# Patient Record
Sex: FEMALE | Race: WHITE | NOT HISPANIC OR LATINO | URBAN - METROPOLITAN AREA
[De-identification: names, ages, dates, MRNs, and addresses within clinical notes are randomized per-mention and may not be internally consistent; named-entity substitution may affect disease eponyms.]

---
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
## 2021-03-11 ENCOUNTER — SEXUAL HEALTH (OUTPATIENT)
Dept: SURGERY | Facility: CLINIC | Age: 21
End: 2021-03-11

## 2021-03-11 DIAGNOSIS — Z11.3 SCREENING FOR STD (SEXUALLY TRANSMITTED DISEASE): Primary | ICD-10-CM

## 2021-03-11 NOTE — PROGRESS NOTES
Assessment/Plan:        Subjective:      Patient ID: Dyke Carrel is a 24 y o  female  Objective: There were no vitals taken for this visit  CHIEF CONCERN(S)      Here for screening, denies symptoms, does report slight watery discharge, but declines exam with provider, patient is currently on menes, per protocol, previous exam, does not require exam for screening  ua obtained for gc/ct screening    LMP 3/8/21    LPS none     Birth Control Method none    Condom Used: Yes    Sexual Preference :  Bisexual    Date of Last Sexual Exposure: 2/2020    # of Partners: Last 30 days 0, Last 80 days 0 and Last Year 1    Sexual Practices: Oral and Vaginal      Previously Sexually Transmitted Diseases  Type Date Source of Care Treatment Comment                            Test Date Results   RPR     HIC     GC     CT           1  CURRENT RISK BEHAVIOR(S)    Unprotected sex with multiple/anonymous partners, sex under the influence of drugs or alcohol and Unprotected sex with a partner(s) with an STD? HIV     PREVIOUS SUCCESSES    Used condoms when having sex and Discussed condom use prior to having sex with partner(s)        3  SAFER GOAL BEHAVIOR(S)    Always use condoms to have sex and Get tested if condom breaks/ leaks          4  PERSON ACTION PLAN:    > BARRIERS:    No condom use or discussions    > BENEFITS:    Obtain free condoms from B to decrease STD exposure        > ACTION STEPS:      Use condoms at least 50% of the time and steadily increase over time until condom use is 100% of the time and Discuss condom use prior to having sex with partner(s)    Testing today hiv/std  Consent for hiv testing  ua for gc/ct      4   REFERRALS:

## 2021-03-18 ENCOUNTER — SEXUAL HEALTH (OUTPATIENT)
Dept: SURGERY | Facility: CLINIC | Age: 21
End: 2021-03-18

## 2021-03-18 DIAGNOSIS — Z20.2 EXPOSURE TO GONORRHEA: Primary | ICD-10-CM

## 2021-03-18 NOTE — PROGRESS NOTES
Pt given results for HIV/STI testing  Pt results were positive for GC, negative for HIV and CT  Lab result copies were given to patient  Per provider order, dispensed  Azythromycin 1 gram po 1X now with water, and Ceftriaxone 500 mg IM given in right dorsal gluteal  Medication information and education for GC reviewed and given to patient     Pt encouraged to return with any new symptoms, new sexual partners and at least once yearly

## 2021-03-18 NOTE — PROGRESS NOTES
Pt was seen in the STD clinic for results  Pt was positive for gonorrhea and explained how gonorrhea is transmitted and if partner is not treated she can become reinfected again  Pt was advised to clean all sex toys  Pt reports she had 1 sex partner last March 2020 and he told her he had only 1 other partner and did not have any STD's; condom was used  Discussed cover pt for CT/GC since she was concerned and had been treated last year in 2/2020 for CT  Pt gave verbal consent for treatment  Pt's weight: 191 lb  · Please give ceftriaxone 500 mg IM X 1 dose now and azithromycin 1 gram PO X 1 dose now  · Follow up tested for STD recommended in 3 months or later  Practicing safe sex using a condom for each sexually encounter  Condoms offer the best protection against STD's by acting as a physical barrier to prevent the exchange of semen, vaginal fluids, and blood between partners  Condoms are not a 100% effective in protection  · Reducing the number of sexual partners can also help to reduce the risk of the transmission of STD's along with regular STD screening

## 2021-03-29 ENCOUNTER — TELEPHONE (OUTPATIENT)
Dept: SURGERY | Facility: CLINIC | Age: 21
End: 2021-03-29

## 2021-03-31 ENCOUNTER — TELEPHONE (OUTPATIENT)
Dept: SURGERY | Facility: CLINIC | Age: 21
End: 2021-03-31

## 2021-03-31 NOTE — TELEPHONE ENCOUNTER
Returned call to Replaced by [facility], she has questions re: positive gc result and treatment, she states she contacted the 1 person she had sexual encounter with between her previous testing in 2020 and treatment for ct,  and this test done on 3/11/21, she reports that sexual contact states he was tested and negative, she has questions of how this could be  Discussed possibilities with pt  :  1  Possible false positive gc result- that we treat based on lab results,  and treatment is the protocol  2  Possible that previous sexual contact either did not get tested or that he had a false negative, or that he had a positive result and was treated but did not want to share that with her  3  Possible that the gc was present at last test in 2020 but was not detected at that time  Not likely but is a possibility  Encouraged pt  To get retested in couple months and earlier if any symptoms, and to call again if further questions    Pt  States understanding

## 2022-01-06 ENCOUNTER — SEXUAL HEALTH (OUTPATIENT)
Dept: SURGERY | Facility: CLINIC | Age: 22
End: 2022-01-06

## 2022-01-06 DIAGNOSIS — Z11.3 SCREENING FOR STD (SEXUALLY TRANSMITTED DISEASE): Primary | ICD-10-CM

## 2022-01-06 NOTE — PROGRESS NOTES
Assessment/Plan:     Patient here for testing  Patient was not seen by a provider  Subjective:      Patient ID: Haley aWyne is a 22 y o  female  HPI        Review of Systems      Objective: There were no vitals taken for this visit  Physical Exam           CHIEF CONCERN(S)    Patient here for testing    No LMP recorded  12/15/2021    LPS n/a    Birth Control Method n/a  Condom Used: No     Sexual Preference :  Bisexual    Date of Last Sexual Exposure:1/1/2021    # of Partners: Last 30 days 1, Last 90 days 1 and Last Year 1    Sexual Practices: Oral and Vaginal      Previously Sexually Transmitted Diseases  Type Date Source of Care Treatment Comment   GC 3/730297  ceftriaxone    CT 2020  pills               Test Date Results   RPR 3/17/2021 negative   HIv 3/17/2021 negative   GC 3/17/2021 positive   CT 3/17/2020 negative         1  CURRENT RISK BEHAVIOR(S)    Unprotected sex with multiple/anonymous partners and Unprotected sex with a partner(s) with an STD? HIV     PREVIOUS SUCCESSES    Maintained a monogamous relationship with only one partner and Discussed condom use prior to having sex with partner(s)        3  SAFER GOAL BEHAVIOR(S)    Always use condoms to have sex and Get tested if condom breaks/ leaks          4  PERSON ACTION PLAN:    > BARRIERS:    No condom use or discussions    > BENEFITS:    Obtain free condoms from B to decrease STD exposure        > ACTION STEPS:      Choose to abstain from sex and Get tested is an exposure occurred such as a condom breaks/ leaked          4   REFERRALS:

## 2022-01-13 ENCOUNTER — SEXUAL HEALTH (OUTPATIENT)
Dept: SURGERY | Facility: CLINIC | Age: 22
End: 2022-01-13

## 2022-01-13 DIAGNOSIS — Z71.2 ENCOUNTER TO DISCUSS TEST RESULTS: Primary | ICD-10-CM

## 2022-01-13 NOTE — PROGRESS NOTES
Patient given results  All tests, GC, CT, Syphilis, HIV, were negative  Copies of test results given to the patient  Patient encouraged to return with any new symptoms, new sexual partners and at least once yearly

## 2022-12-05 ENCOUNTER — OFFICE VISIT (OUTPATIENT)
Dept: OBGYN CLINIC | Facility: CLINIC | Age: 22
End: 2022-12-05

## 2022-12-05 VITALS
HEART RATE: 85 BPM | WEIGHT: 219 LBS | BODY MASS INDEX: 38.8 KG/M2 | DIASTOLIC BLOOD PRESSURE: 89 MMHG | SYSTOLIC BLOOD PRESSURE: 135 MMHG | HEIGHT: 63 IN

## 2022-12-05 DIAGNOSIS — R21 SKIN RASH: ICD-10-CM

## 2022-12-05 DIAGNOSIS — Z01.411 ENCOUNTER FOR GYNECOLOGICAL EXAMINATION WITH ABNORMAL FINDING: Primary | ICD-10-CM

## 2022-12-05 PROBLEM — Z01.419 ENCOUNTER FOR GYNECOLOGICAL EXAMINATION WITHOUT ABNORMAL FINDING: Status: ACTIVE | Noted: 2022-12-05

## 2022-12-05 RX ORDER — AMOXICILLIN 500 MG/1
CAPSULE ORAL
COMMUNITY
Start: 2022-10-07 | End: 2022-12-05 | Stop reason: ALTCHOICE

## 2022-12-05 RX ORDER — CLOTRIMAZOLE AND BETAMETHASONE DIPROPIONATE 10; .64 MG/G; MG/G
CREAM TOPICAL
COMMUNITY
Start: 2022-10-18 | End: 2022-12-05 | Stop reason: ALTCHOICE

## 2022-12-05 RX ORDER — NYSTATIN 100000 U/G
CREAM TOPICAL 2 TIMES DAILY
Qty: 30 G | Refills: 0 | Status: SHIPPED | OUTPATIENT
Start: 2022-12-05 | End: 2022-12-19

## 2022-12-05 RX ORDER — PREDNISONE 10 MG/1
TABLET ORAL
COMMUNITY
Start: 2022-10-18 | End: 2022-12-05 | Stop reason: ALTCHOICE

## 2022-12-05 NOTE — PROGRESS NOTES
ASSESSMENT & PLAN: Sophy Gordon is a 25 y o   G0 obstetric history on file  with normal gynecologic exam     1   Routine well woman exam done today  2  Pap:  The patient's last pap-1st Pap done today  Current ASCCP Guidelines reviewed  3   STD testing  was not done patient declines  4  Gardasil recommendations reviewed  is not   vaccinated  Information provided reviewed for her to check with her insurance  5  The following were reviewed in today's visit: breast self exam, STD testing, adequate intake of calcium and vitamin D, exercise and healthy diet  6  Nystatin external cream to be applied to right groin b i d  Times 14 days, return to office if not improved      CC:  Annual Gynecologic Examination    HPI: Sophy Gordon is a 25 y o  No obstetric history on file  who presents for annual gynecologic examination  She is a new patient to us  Positive GC and CT in the past  about 2 years ago and negative f/u  She reports she is unsure if her insurance will pay for testing and declines testing today  She reviewed she can go to [facility] to see she can get testing done there  She is in a same-sex relationship, reports she was  09/2022  Patient reports she moved from [facility] about 5 years ago  She reports she did not get Gardasil vaccine but will comes information on vaccine  She does report an itchy rash on her right groin, she denies any vaginal discharge or itching  Health Maintenance:    She wears her seatbelt routinely  She does perform regular monthly self breast exams  She feels safe at home  History reviewed  No pertinent past medical history  History reviewed  No pertinent surgical history  OB/Gyn History:    Pt does not have menstrual issues  Menses are regular, last 7 days, can be heavy for the 1st 2 days, changes pads and tampons 3 times per day when heavy    History of sexually transmitted infection: Yes    Chlamydia and gonorrhea  History of abnormal pap smears: No 1st Pap due today  Patient is currently sexually active  The current method of family planning is none  OB History    No obstetric history on file  Family History   Problem Relation Age of Onset   • Diabetes Father    • No Known Problems Brother    • Breast cancer Neg Hx    • Colon cancer Neg Hx    • Ovarian cancer Neg Hx        Social History:  Social History     Socioeconomic History   • Marital status: Single     Spouse name: Not on file   • Number of children: Not on file   • Years of education: Not on file   • Highest education level: Not on file   Occupational History   • Not on file   Tobacco Use   • Smoking status: Former     Packs/day: 0 10     Types: Cigarettes     Quit date: 2022     Years since quittin 8   • Smokeless tobacco: Never   Vaping Use   • Vaping Use: Never used   Substance and Sexual Activity   • Alcohol use: Yes     Comment: socially   • Drug use: Not Currently   • Sexual activity: Yes     Partners: Female     Birth control/protection: None   Other Topics Concern   • Not on file   Social History Narrative   • Not on file     Social Determinants of Health     Financial Resource Strain: Low Risk    • Difficulty of Paying Living Expenses: Not hard at all   Food Insecurity: No Food Insecurity   • Worried About Running Out of Food in the Last Year: Never true   • Ran Out of Food in the Last Year: Never true   Transportation Needs: No Transportation Needs   • Lack of Transportation (Medical): No   • Lack of Transportation (Non-Medical):  No   Physical Activity: Not on file   Stress: No Stress Concern Present   • Feeling of Stress : Not at all   Social Connections: Not on file   Intimate Partner Violence: Not At Risk   • Fear of Current or Ex-Partner: No   • Emotionally Abused: No   • Physically Abused: No   • Sexually Abused: No   Housing Stability: Low Risk    • Unable to Pay for Housing in the Last Year: No   • Number of Places Lived in the Last Year: 2   • Unstable Housing in the Last Year: No     Patient is   Patient is currently employed     No Known Allergies      Current Outpatient Medications:   •  nystatin (MYCOSTATIN) cream, Apply topically 2 (two) times a day for 14 days, Disp: 30 g, Rfl: 0    Review of Systems:  Constitutional :no fever, feels well, no tiredness, no recent weight gain or loss  ENT: no ear ache, no loss of hearing, no nosebleeds or nasal discharge, no sore throat or hoarseness  Cardiovascular: no complaints of slow or fast heart beat, no chest pain, no palpitations, no leg claudication or lower extremity edema  Respiratory: no complaints of shortness of shortness of breath, no LOZOYA  Breasts:no complaints of breast pain, breast lump, or nipple discharge  Gastrointestinal: no complaints of abdominal pain, constipation, nausea, vomiting, or diarrhea or bloody stools  Genitourinary : no complaints of dysuria, incontinence, pelvic pain, no dysmenorrhea, vaginal discharge or abnormal vaginal bleeding and as noted in HPI  Musculoskeletal: no complaints of arthralgia, no myalgia, no joint swelling or stiffness, no limb pain or swelling    Integumentary: no complaints of skin rash or lesion, itching or dry skin  Neurological: no complaints of headache, no confusion, no numbness or tingling, no dizziness or fainting    Objective      /89   Pulse 85   Ht 5' 3" (1 6 m)   Wt 99 3 kg (219 lb)   LMP 11/23/2022   BMI 38 79 kg/m²     General:   appears stated age, cooperative, alert normal mood and affect   Neck: normal, supple,trachea midline, no masses   Heart: regular rate and rhythm, S1, S2 normal, no murmur, click, rub or gallop   Lungs: clear to auscultation bilaterally   Breasts: normal appearance, no masses or tenderness, Inspection negative, No nipple retraction or dimpling, No nipple discharge or bleeding, No axillary or supraclavicular adenopathy, Normal to palpation without dominant masses, Taught monthly breast self examination   Abdomen: soft, non-tender, without masses or organomegaly   Vulva: normal female genitalia, Bartholin's, Urethra, Durand normal   Right panty line area with erythema, demarcated, yeast like itchy rash   Vagina: normal vagina, no discharge, exudate, lesion, or erythema   Urethra: normal   Cervix: Normal, no discharge  PAP done  Nontender  Uterus: normal size, contour, position, consistency, mobility, non-tender and anteverted   Adnexa: normal adnexa and no mass, fullness, tenderness   Lymphatic palpation of lymph nodes in neck, axilla, groin and/or other locations: no lymphadenopathy or masses noted   Skin normal skin turgor and no rashes     Psychiatric orientation to person, place, and time: normal  mood and affect: normal

## 2022-12-13 LAB
LAB AP GYN PRIMARY INTERPRETATION: NORMAL
Lab: NORMAL

## 2023-02-07 ENCOUNTER — TELEPHONE (OUTPATIENT)
Dept: PSYCHIATRY | Facility: CLINIC | Age: 23
End: 2023-02-07

## 2023-02-07 NOTE — TELEPHONE ENCOUNTER
Writer contacted pt in regards to a vm we received in which pt requested talk therapy services  Writer informed her that there are no openings available at this moment   She refused to be added to wait list

## 2023-02-17 ENCOUNTER — OFFICE VISIT (OUTPATIENT)
Dept: OBGYN CLINIC | Facility: CLINIC | Age: 23
End: 2023-02-17

## 2023-02-17 VITALS
BODY MASS INDEX: 38.62 KG/M2 | SYSTOLIC BLOOD PRESSURE: 126 MMHG | WEIGHT: 218 LBS | DIASTOLIC BLOOD PRESSURE: 80 MMHG | HEIGHT: 63 IN

## 2023-02-17 DIAGNOSIS — N89.8 VAGINAL DISCHARGE: ICD-10-CM

## 2023-02-17 DIAGNOSIS — L29.2 VULVAR ITCHING: ICD-10-CM

## 2023-02-17 DIAGNOSIS — R39.9 UTI SYMPTOMS: Primary | ICD-10-CM

## 2023-02-17 LAB
BV WHIFF TEST VAG QL: NEGATIVE
CLUE CELLS SPEC QL WET PREP: NORMAL
PH SMN: 4.5 [PH]
SL AMB  POCT GLUCOSE, UA: NORMAL
SL AMB LEUKOCYTE ESTERASE,UA: NORMAL
SL AMB POCT BILIRUBIN,UA: NORMAL
SL AMB POCT BLOOD,UA: NORMAL
SL AMB POCT KETONES,UA: NORMAL
SL AMB POCT NITRITE,UA: NORMAL
SL AMB POCT PH,UA: 5
SL AMB POCT SPECIFIC GRAVITY,UA: 1.02
SL AMB POCT URINE PROTEIN: NORMAL
SL AMB POCT UROBILINOGEN: NORMAL
SL AMB POCT WET MOUNT: NORMAL
T VAGINALIS VAG QL WET PREP: NORMAL
YEAST VAG QL WET PREP: NORMAL

## 2023-02-17 RX ORDER — CLOTRIMAZOLE AND BETAMETHASONE DIPROPIONATE 10; .64 MG/G; MG/G
CREAM TOPICAL 2 TIMES DAILY
Qty: 30 G | Refills: 0 | Status: SHIPPED | OUTPATIENT
Start: 2023-02-17 | End: 2023-02-24

## 2023-02-17 RX ORDER — SODIUM FLUORIDE 6 MG/ML
PASTE, DENTIFRICE DENTAL
COMMUNITY
Start: 2022-12-01

## 2023-02-17 RX ORDER — IBUPROFEN 400 MG/1
400 TABLET ORAL AS NEEDED
COMMUNITY

## 2023-02-17 NOTE — PROGRESS NOTES
Assessment/Plan:       Diagnoses and all orders for this visit:    UTI symptoms  -     POCT urine dip  -     Urine culture    Vulvar itching  -     clotrimazole-betamethasone (LOTRISONE) 1-0 05 % cream; Apply topically 2 (two) times a day for 7 days  -     Cancel: VAGINOSIS DNA PROBE (AFFIRM)  -     VAGINOSIS DNA PROBE (AFFIRM)  -     POCT wet mount    Vaginal discharge  -     Cancel: VAGINOSIS DNA PROBE (AFFIRM)  -     VAGINOSIS DNA PROBE (AFFIRM)  -     POCT wet mount    Other orders  -     Sodium Fluoride 5000 PPM 1 1 % PSTE; BRUSH FOR 2 MINUTES AND DO NOT RINSE  -     ibuprofen (MOTRIN) 400 mg tablet; Take 400 mg by mouth if needed for mild pain      50-year-old female new to our office, vaginal, vulvar and urinary symptoms as in HPI  Reported history of chlamydia and gonorrhea treated 3 years ago  Female partner  Pap - December 2022  Examination unremarkable  Wet mount unremarkable  Urine dip normal    Plan:    Affirm today  Recommended STD screening Chlamydia gonorrhea  Patient concerned about it not being covered by insurance and would prefer to go to free STD clinic  Patient will consider going to [place] [id number] [facility]D clinic in [place]  Urine culture today  Topical Lotrisone to affected areas of vulva twice a day for 7 days for alleviation of sxs  Discussed if testing is negative for any infection could be hygiene or irritant  We will discuss definitive treatment plan once results are back  Discussed patient's menstrual cycles in more detail and they do seem to be fairly regular  She just saw*[facility] for annual in December and documented that periods are regular and does not have any menstruation concern at that time  She did get menstruation in December as well as in January with LMP 1/29    I have recommended patient download anny on her phone such as flow instead of just writing down dates that way she can track the number of days between her menses to get a better idea of regularity  Patient advised to make sure she follows up with our office or [place] for her annual, sooner if any problems arise or follow-up is needed to her problems  Chief Complaint   Patient presents with   • Vaginal Itching     Itching on outside of vagina started 2 weeks ago  • Possible UTI     Pt states she has a tingling feeling to urinate  Denies burning when urinating, bladder pain, and spotting  • Vaginal Discharge     Pt states she's been having more discharge than normal, clear/watery  • Menstrual Problem     Irregular periods 2-3 week delay  Subjective:      Patient ID: Dwayne March is a 25 y o  female  23y/o F here today for vaginal and urinary sxs x 1-2 wks  States feels itching vulvar and vaginal opening  She thinks increase in discharge through the years but denies odor or abnormal color or consistency  Reports discharge is clear and watery  She also feels something urinary, weird feeling near urethra or in vaginal area, like a urinary urge  Denies pelvic pressure or pain  Does feel some pressure in vagina  Denies hematuria, though she has been having increase in urination  She is sexually active  She has a female partner  States she has hx of chlamydia/gonorrhea about 3 yrs ago, treated STD clinic [address]  States had a f/u test and was negative  Denies any recent STD screening within the past year  Patient also verbalized concern about possibly irregular menses  However she is looking at her dates of LMP's and are seeming to be around once a month about every 30 days or so  She thinks that she forgot to track one of her LMP's in November  They are moderate to light and amount of blood and last 7 days  Pap and annual up-to-date performed at [facility] in December 2022  Pap was negative            The following portions of the patient's history were reviewed and updated as appropriate: allergies, current medications, past medical history, past social history and problem list     Review of Systems   Constitutional: Negative  Gastrointestinal: Negative  Genitourinary:        As in HPI         Objective:      /80 (BP Location: Left arm, Patient Position: Sitting, Cuff Size: Adult)   Ht 5' 3" (1 6 m)   Wt 98 9 kg (218 lb)   LMP 01/29/2023 (Exact Date)   BMI 38 62 kg/m²          Physical Exam  Vitals reviewed  Constitutional:       Appearance: Normal appearance  Abdominal:      Tenderness: There is no abdominal tenderness  Genitourinary:     General: Normal vulva  Labia:         Right: No rash, tenderness or lesion  Left: No rash, tenderness or lesion  Vagina: Normal  No vaginal discharge, erythema, tenderness, bleeding or lesions  Cervix: No cervical motion tenderness, discharge, friability, lesion, erythema or cervical bleeding  Uterus: Not tender  Comments: No visible erythema, swelling, lesions or rash in areas reported itchy  Lymphadenopathy:      Lower Body: No right inguinal adenopathy  No left inguinal adenopathy  Neurological:      Mental Status: She is alert and oriented to person, place, and time  Psychiatric:         Mood and Affect: Mood normal          Behavior: Behavior normal  Behavior is cooperative

## 2023-02-18 LAB
CANDIDA RRNA VAG QL PROBE: POSITIVE
G VAGINALIS RRNA GENITAL QL PROBE: NEGATIVE
T VAGINALIS RRNA GENITAL QL PROBE: NEGATIVE

## 2023-02-19 LAB — BACTERIA UR CULT: NORMAL

## 2023-02-20 DIAGNOSIS — B37.31 VAGINAL YEAST INFECTION: Primary | ICD-10-CM

## 2023-02-20 RX ORDER — FLUCONAZOLE 150 MG/1
150 TABLET ORAL ONCE
Qty: 2 TABLET | Refills: 0 | Status: SHIPPED | OUTPATIENT
Start: 2023-02-20 | End: 2023-02-20

## 2023-05-04 ENCOUNTER — SEXUAL HEALTH (OUTPATIENT)
Dept: SURGERY | Facility: CLINIC | Age: 23
End: 2023-05-04

## 2023-05-04 DIAGNOSIS — Z11.3 SCREENING FOR STD (SEXUALLY TRANSMITTED DISEASE): Primary | ICD-10-CM

## 2023-05-04 DIAGNOSIS — B37.31 VAGINAL CANDIDIASIS: ICD-10-CM

## 2023-05-04 NOTE — PROGRESS NOTES
Per orders, pt given clotrimazole 1 application daily hs x 7 days  Pt also given medication education slip  Pt expressed understanding

## 2023-05-04 NOTE — PROGRESS NOTES
Assessment/Plan:  Cervical swab collected for GC/CT testing  Wet mount done  Blood collected for HIV/syphilis testing  Recommend safer sex practices including 100% condom use  Recommend regular STD testing  Follow up 1 week for results  Practicing safe sex using a condom for each sexually encounter  Condoms offer the best protection against STD's by acting as a physical barrier to prevent the exchange of semen, vaginal fluids, and blood between partners  Condoms are not a 100% effective in protection  Reducing the number of sexual partners can also help to reduce the risk of the transmission of STD's along with regular STD screening  No problem-specific Assessment & Plan notes found for this encounter  Patient states she does have a PCP  Given that our clinic can only evaluate for Stds per protocol, I have advised patient to schedule an appointment with PCP and discuss lab work which includes evaluation of blood glucose given recent reported (by patient) increase in urinary frequency and reported (by patient) increase in frequency of vaginal yeast infections  Patient states understanding and states she will schedule an appointment with her PCP  Diagnoses and all orders for this visit:    Screening for STD (sexually transmitted disease)    Vaginal candidiasis      Will treat with clotrimazole cream-1 application daily at bedtime  Subjective:      Patient ID: Greg Huntley is a 26 y o  female  Patient presents to STD clinic for STD screening  Reports 1 female partner  Reports having more frequent urination lately  Has been seen by GYN and urine dipstick was negative for infection at the GYN office, per patient's report  Denies, burning, pain, itching, rashes or sores  Reports white vaginal discharge  Denies foul smell        The following portions of the patient's history were reviewed and updated as appropriate: allergies, current medications, past family history, past medical history, past social history, past surgical history and problem list     Review of Systems   Constitutional: Negative for chills, diaphoresis, fatigue and fever  Genitourinary: Negative for decreased urine volume, difficulty urinating, dyspareunia, dysuria, genital sores, hematuria, pelvic pain, urgency, vaginal bleeding, vaginal discharge and vaginal pain  Frequent urination   Skin: Negative for rash and wound  Hematological: Negative for adenopathy  Objective: There were no vitals taken for this visit  Physical Exam  Nursing note reviewed  Constitutional:       General: She is not in acute distress  Appearance: Normal appearance  She is well-developed  She is not ill-appearing or toxic-appearing  HENT:      Head: Normocephalic and atraumatic  Genitourinary:     Exam position: Supine  Labia:         Right: No rash, tenderness, lesion or injury  Left: No rash, tenderness, lesion or injury  Vagina: Normal  No signs of injury and foreign body  No vaginal discharge, erythema, tenderness or bleeding  Cervix: No discharge or friability  Uterus: Not enlarged and not tender  Neurological:      Mental Status: She is alert and oriented to person, place, and time  Psychiatric:         Mood and Affect: Mood normal          Behavior: Behavior normal          Thought Content: Thought content normal          Judgment: Judgment normal                 CHIEF CONCERN(S) Pt c/o not being able to completely empty bladder  Pt c/o clear, liquid like discharge       4/11/2023 LPS2/2023    Birth Control Method none    Condom Used: No     Sexual Preference :  Female    Date of Last Sexual Exposure: 4/28/2023    # of Partners: Last 30 days 1, Last 80 days 1 and Last Year 1    Sexual Practices: Oral and Vaginal      Previously Sexually Transmitted Diseases  Type Date Source of Care Treatment Comment   Chlamydia  2021      gonorrhea 2020                 Test Date Results   RPR 1/2022 neg   HIC 1/2022 neg   GC 1/2022 neg   CT 1/2022 neg         1  CURRENT RISK BEHAVIOR(S)    Unprotected sex with multiple/anonymous partners and sex under the influence of drugs or alcohol     PREVIOUS SUCCESSES    Used condoms when having sex, Maintained a monogamous relationship with only one partner, Practiced abstinence and Discussed condom use prior to having sex with partner(s)        3  SAFER GOAL BEHAVIOR(S)    Always use condoms to have sex, Practice abstinence, Pre-exposure prophylaxis (PrEP), Practice monogamy and Get tested if condom breaks/ leaks          4  PERSON ACTION PLAN:    > BARRIERS:    No condom use or discussions    > BENEFITS:    Obtain free condoms from [facility] to decrease STD exposure and Provides a healthier sexual relationship and can reduce STD's        > ACTION STEPS:      Use condoms at least 50% of the time and steadily increase over time until condom use is 100% of the time, Choose to abstain from sex, Discuss condom use prior to having sex with partner(s) and Get tested is an exposure occurred such as a condom breaks/ leaked          4   REFERRALS:

## 2023-05-11 ENCOUNTER — SEXUAL HEALTH (OUTPATIENT)
Dept: SURGERY | Facility: CLINIC | Age: 23
End: 2023-05-11

## 2023-05-11 DIAGNOSIS — Z71.2 ENCOUNTER TO DISCUSS TEST RESULTS: Primary | ICD-10-CM

## 2023-05-11 NOTE — PROGRESS NOTES
Pt given results for HIV/STI testing  Pt results were negative for HIV,CT, GC and RPR  Results reviewed and copies were given to patient  Patient re-educated on safe sex practices  Patient states understanding  Pt encouraged to return with any new symptoms, new sexual partners and at least once yearly  Per STD clinic protocol client did not need to see provider, She was seen at previous visit and had no symptoms at this time

## 2023-10-18 ENCOUNTER — OFFICE VISIT (OUTPATIENT)
Dept: FAMILY MEDICINE CLINIC | Facility: CLINIC | Age: 23
End: 2023-10-18
Payer: COMMERCIAL

## 2023-10-18 VITALS
BODY MASS INDEX: 40.86 KG/M2 | OXYGEN SATURATION: 99 % | HEIGHT: 63 IN | RESPIRATION RATE: 18 BRPM | TEMPERATURE: 97.7 F | SYSTOLIC BLOOD PRESSURE: 119 MMHG | WEIGHT: 230.6 LBS | HEART RATE: 86 BPM | DIASTOLIC BLOOD PRESSURE: 70 MMHG

## 2023-10-18 DIAGNOSIS — Z11.4 ENCOUNTER FOR SCREENING FOR HIV: ICD-10-CM

## 2023-10-18 DIAGNOSIS — Z13.220 SCREENING FOR LIPID DISORDERS: ICD-10-CM

## 2023-10-18 DIAGNOSIS — Z11.59 ENCOUNTER FOR HEPATITIS C SCREENING TEST FOR LOW RISK PATIENT: ICD-10-CM

## 2023-10-18 DIAGNOSIS — R21 RASH: Primary | ICD-10-CM

## 2023-10-18 DIAGNOSIS — L91.8 ACROCHORDON: ICD-10-CM

## 2023-10-18 DIAGNOSIS — Z13.1 SCREENING FOR DIABETES MELLITUS (DM): ICD-10-CM

## 2023-10-18 DIAGNOSIS — G43.909 MIGRAINES: Primary | ICD-10-CM

## 2023-10-18 PROCEDURE — 99204 OFFICE O/P NEW MOD 45 MIN: CPT

## 2023-10-18 RX ORDER — NORTRIPTYLINE HYDROCHLORIDE 25 MG/1
25 CAPSULE ORAL
COMMUNITY
Start: 2023-06-12 | End: 2023-10-18 | Stop reason: SDUPTHER

## 2023-10-18 RX ORDER — NYSTATIN 100000 [USP'U]/G
POWDER TOPICAL 3 TIMES DAILY
Qty: 60 G | Refills: 0 | Status: SHIPPED | OUTPATIENT
Start: 2023-10-18 | End: 2023-11-01

## 2023-10-18 NOTE — ASSESSMENT & PLAN NOTE
Patient reports that this skin lesion has been present for most of her life. No color changes or changes in size noted. Brown pigmented, pedunculated skin lesion that 1-2 mm in size noted under left arm    Plan:  Plan to follow-up at patient's convenience for cryotherapy. Alternative plan of referral to dermatology discussed with patient.

## 2023-10-18 NOTE — PROGRESS NOTES
Name: Yaneth Mabry      : 2000      MRN: [id number]  Encounter Provider: Rachel Mckeon DO  Encounter Date: 10/18/2023   Encounter department: [facility]    Assessment & Plan     1. Rash  Assessment & Plan:  Patient has a erythematous and pruritic rash underneath her breasts for 1 weeks duration. Most likely fungal in nature given presentation and history    Plan:  Nystatin powder TID for 2 weeks  After 2 weeks may continue with OTC non-talc topical powder  Contact office if symptoms fail to improve    Orders:  -     nystatin (MYCOSTATIN) powder; Apply topically 3 (three) times a day for 14 days    2. BMI 40.0-44.9, adult [facility])  Assessment & Plan:  Plan:  Lipid panel and CMP ordered today. Discuss lifestyle modifications. Orders:  -     Comprehensive metabolic panel; Future  -     Lipid Panel with Direct LDL reflex; Future    3. Encounter for screening for HIV  -     HIV 1/2 AB/AG w Reflex [facility] for 2 yr old and above; Future    4. Encounter for hepatitis C screening test for low risk patient  -     Hepatitis C antibody; Future    5. Acrochordon  Assessment & Plan:  Patient reports that this skin lesion has been present for most of her life. No color changes or changes in size noted. Brown pigmented, pedunculated skin lesion that 1-2 mm in size noted under left arm    Plan:  Plan to follow-up at patient's convenience for cryotherapy. Alternative plan of referral to dermatology discussed with patient. BMI Counseling: Body mass index is 40.85 kg/m². The BMI is above normal. Nutrition recommendations include encouraging healthy choices of fruits and vegetables and reducing intake of cholesterol. Exercise recommendations include exercising 3-5 times per week. No pharmacotherapy was ordered. Rationale for BMI follow-up plan is due to patient being overweight or obese.      Depression Screening and Follow-up Plan: Patient was screened for depression during today's encounter. They screened negative with a PHQ-2 score of 2. Subjective      27 yo female with a pmhx of migraines controled with nortriptyline presents to the office to establish care. Her previous PCP was in [place] and she recently moved back to the [facility]. She reports that she is overall doing well. Her primary concerns is a rash under her left breast. The rash started about a week ago and is itchy. She denies rashes in other regions of her body and believes that the rash under her breast has been stable over the past week. She endorses that it is painful to touch. No home remedies tried. No aggravating factors noted. She also endorses a wart under left arm since she was a child. She had a similar presentation on her right arm, which was removed. No pain or itching associated with the lesion. She reports that her dad had a history of fungal infections and warts. She denies changes in color or size of the lesion. Social history:  Tobacco: Previous smoker, for 1 year, quite in February 2022. Alcohol: Occasional alcohol use, 1-2 drinks a week. Recreational drugs: No recreational drug use. Women's health:  Currently sexually active with one female partner, no concerns for STI  She endorses regular periods, with no concerns. Pap exam is up to date. Mental health:  Feels anxious. Goes to therapy and feels as though it helps. Support system: her wife  PHQ-2: 2           Review of Systems   Constitutional:  Negative for chills and fever. Respiratory:  Negative for shortness of breath. Cardiovascular:  Negative for chest pain. Gastrointestinal:  Negative for abdominal pain, diarrhea and nausea. Genitourinary:  Negative for menstrual problem. Musculoskeletal:  Negative for joint swelling and myalgias. Skin:  Positive for rash. Negative for wound. Neurological:  Negative for headaches.    Psychiatric/Behavioral:  The patient is nervous/anxious (patient reports that is is controlled at this time). Current Outpatient Medications on File Prior to Visit   Medication Sig    nortriptyline (PAMELOR) 25 mg capsule Take 25 mg by mouth    [DISCONTINUED] clotrimazole-betamethasone (LOTRISONE) 1-0.05 % cream Apply topically 2 (two) times a day for 7 days    [DISCONTINUED] ibuprofen (MOTRIN) 400 mg tablet Take 400 mg by mouth if needed for mild pain    [DISCONTINUED] Sodium Fluoride 5000 PPM 1.1 % PSTE BRUSH FOR 2 MINUTES AND DO NOT RINSE       Objective     /70 (BP Location: Left arm, Patient Position: Sitting, Cuff Size: Large)   Pulse 86   Temp 97.7 °F (36.5 °C) (Tympanic)   Resp 18   Ht 5' 3" (1.6 m)   Wt 105 kg (230 lb 9.6 oz)   SpO2 99%   BMI 40.85 kg/m²     Physical Exam  Vitals reviewed. Constitutional:       General: She is not in acute distress. Appearance: Normal appearance. She is not ill-appearing, toxic-appearing or diaphoretic. HENT:      Head: Normocephalic and atraumatic. Right Ear: External ear normal.      Left Ear: External ear normal.      Nose: Nose normal.      Mouth/Throat:      Mouth: Mucous membranes are moist.      Pharynx: Oropharynx is clear. Eyes:      Conjunctiva/sclera: Conjunctivae normal.   Cardiovascular:      Rate and Rhythm: Normal rate and regular rhythm. Pulses: Normal pulses. Heart sounds: Normal heart sounds. No murmur heard. Pulmonary:      Effort: Pulmonary effort is normal. No respiratory distress. Breath sounds: No wheezing, rhonchi or rales. Abdominal:      Palpations: Abdomen is soft. Tenderness: There is no abdominal tenderness. Skin:     General: Skin is warm and dry. Findings: Lesion (Raised skin lesion just distal to the axilla of the left arm. Lesion is homogenously brown in color. No surrounding errythema or skin irritation.) and rash (erythematous rash noted on inferior aspect of breasts bilaterally. Most prominent medially.  Rash is located along the skin fold.) present. Neurological:      Mental Status: She is alert and oriented to person, place, and time. Mental status is at baseline. Psychiatric:         Mood and Affect: Mood normal.         Behavior: Behavior normal.         Thought Content:  Thought content normal.       Dari Lowery DO

## 2023-10-18 NOTE — ASSESSMENT & PLAN NOTE
Patient has a erythematous and pruritic rash underneath her breasts for 1 weeks duration.    Most likely fungal in nature given presentation and history    Plan:  Nystatin powder TID for 2 weeks  After 2 weeks may continue with OTC non-talc topical powder  Contact office if symptoms fail to improve

## 2023-10-19 RX ORDER — NORTRIPTYLINE HYDROCHLORIDE 25 MG/1
25 CAPSULE ORAL DAILY
Qty: 90 CAPSULE | Refills: 1 | Status: SHIPPED | OUTPATIENT
Start: 2023-10-19 | End: 2024-10-14

## 2023-10-21 ENCOUNTER — APPOINTMENT (OUTPATIENT)
Dept: LAB | Facility: HOSPITAL | Age: 23
End: 2023-10-21
Payer: COMMERCIAL

## 2023-10-21 DIAGNOSIS — Z11.4 ENCOUNTER FOR SCREENING FOR HIV: ICD-10-CM

## 2023-10-21 DIAGNOSIS — Z13.220 SCREENING FOR LIPID DISORDERS: ICD-10-CM

## 2023-10-21 DIAGNOSIS — Z11.59 ENCOUNTER FOR HEPATITIS C SCREENING TEST FOR LOW RISK PATIENT: ICD-10-CM

## 2023-10-21 DIAGNOSIS — Z13.1 SCREENING FOR DIABETES MELLITUS (DM): ICD-10-CM

## 2023-10-21 LAB
ALBUMIN SERPL BCP-MCNC: 4.1 G/DL (ref 3.5–5)
ALP SERPL-CCNC: 88 U/L (ref 34–104)
ALT SERPL W P-5'-P-CCNC: 17 U/L (ref 7–52)
ANION GAP SERPL CALCULATED.3IONS-SCNC: 7 MMOL/L
AST SERPL W P-5'-P-CCNC: 17 U/L (ref 13–39)
BILIRUB SERPL-MCNC: 0.38 MG/DL (ref 0.2–1)
BUN SERPL-MCNC: 13 MG/DL (ref 5–25)
CALCIUM SERPL-MCNC: 9 MG/DL (ref 8.4–10.2)
CHLORIDE SERPL-SCNC: 103 MMOL/L (ref 96–108)
CHOLEST SERPL-MCNC: 167 MG/DL
CO2 SERPL-SCNC: 27 MMOL/L (ref 21–32)
CREAT SERPL-MCNC: 0.78 MG/DL (ref 0.6–1.3)
GFR SERPL CREATININE-BSD FRML MDRD: 108 ML/MIN/1.73SQ M
GLUCOSE P FAST SERPL-MCNC: 93 MG/DL (ref 65–99)
HDLC SERPL-MCNC: 43 MG/DL
LDLC SERPL CALC-MCNC: 102 MG/DL (ref 0–100)
POTASSIUM SERPL-SCNC: 4 MMOL/L (ref 3.5–5.3)
PROT SERPL-MCNC: 7.5 G/DL (ref 6.4–8.4)
SODIUM SERPL-SCNC: 137 MMOL/L (ref 135–147)
TRIGL SERPL-MCNC: 112 MG/DL

## 2023-10-21 PROCEDURE — 87389 HIV-1 AG W/HIV-1&-2 AB AG IA: CPT

## 2023-10-21 PROCEDURE — 80061 LIPID PANEL: CPT

## 2023-10-21 PROCEDURE — 36415 COLL VENOUS BLD VENIPUNCTURE: CPT

## 2023-10-21 PROCEDURE — 80053 COMPREHEN METABOLIC PANEL: CPT

## 2023-10-21 PROCEDURE — 86803 HEPATITIS C AB TEST: CPT

## 2023-10-22 LAB
HCV AB SER QL: NORMAL
HIV 1+2 AB+HIV1 P24 AG SERPL QL IA: NORMAL
HIV 2 AB SERPL QL IA: NORMAL
HIV1 AB SERPL QL IA: NORMAL
HIV1 P24 AG SERPL QL IA: NORMAL

## 2023-10-23 ENCOUNTER — TELEPHONE (OUTPATIENT)
Dept: FAMILY MEDICINE CLINIC | Facility: CLINIC | Age: 23
End: 2023-10-23

## 2023-10-24 ENCOUNTER — RA CDI HCC (OUTPATIENT)
Dept: OTHER | Facility: HOSPITAL | Age: 23
End: 2023-10-24

## 2023-10-24 NOTE — TELEPHONE ENCOUNTER
Patient contacted this afternoon to discuss lab results. Recommendations for lifestyle modifications discussed.

## 2023-10-24 NOTE — PROGRESS NOTES
[facility] [address] [facility] coding opportunities          Chart Reviewed number of suggestions sent to Provider: 1     Patients Insurance        Commercial Insurance: Derek Savage       Per ICD 10 coding guidelines -   BMI codes should only be assigned when there is an associated, reportable diagnosis (such as obesity).      Please assign appropriate diagnosis code as applicable and assess and document for 2023

## 2023-10-31 ENCOUNTER — PROCEDURE VISIT (OUTPATIENT)
Dept: FAMILY MEDICINE CLINIC | Facility: CLINIC | Age: 23
End: 2023-10-31
Payer: COMMERCIAL

## 2023-10-31 VITALS
HEART RATE: 90 BPM | BODY MASS INDEX: 40.5 KG/M2 | TEMPERATURE: 97.2 F | SYSTOLIC BLOOD PRESSURE: 130 MMHG | RESPIRATION RATE: 15 BRPM | WEIGHT: 228.6 LBS | DIASTOLIC BLOOD PRESSURE: 70 MMHG | HEIGHT: 63 IN | OXYGEN SATURATION: 100 %

## 2023-10-31 DIAGNOSIS — L91.8 INFLAMED SKIN TAG: Primary | ICD-10-CM

## 2023-10-31 PROCEDURE — 11200 RMVL SKIN TAGS UP TO&INC 15: CPT

## 2023-10-31 NOTE — PROGRESS NOTES
Skin tag removal    Date/Time: 10/31/2023 8:00 AM    Performed by: Mayte Agarwal DO  Authorized by: Mayte Agarwal DO  Universal Protocol:  Procedure performed by: (Dr. Suleiman Pires)  Consent: Verbal consent obtained. Consent given by: patient  Patient understanding: patient states understanding of the procedure being performed  Patient consent: the patient's understanding of the procedure matches consent given  Patient identity confirmed: verbally with patient      Procedure Details - Skin Tag Destruction:     Up to 15      Body area:  Upper extremity    Upper extremity location:  L upper arm    Initial size (mm):  2    Final defect size (mm):  0    Malignancy: benign lesion      Destruction method comment:  Scalpel  Lesion 6:        L sided skin tag inside of upper arm near the axilla, mildly irritated. The area was cleaned w/ alcohol wipe, pt was numbed with lidocaine w/ epinephrine. Single skin tag was removed using forceps and 15 blade scalpel. Pt tolerated the procedure well without complications.

## 2023-10-31 NOTE — ASSESSMENT & PLAN NOTE
L sided skin tag inside of upper arm near the axilla, mildly irritated. Plan  The area was cleaned w/ alcohol wipe, pt was numbed with lidocaine w/ epinephrine. Single skin tag was removed using forceps and 15 blade scalpel. Pt tolerated the procedure well without complications.

## 2023-11-13 DIAGNOSIS — F41.9 ANXIETY: Primary | ICD-10-CM

## 2023-11-30 ENCOUNTER — TELEPHONE (OUTPATIENT)
Dept: PSYCHIATRY | Facility: CLINIC | Age: 23
End: 2023-11-30

## 2023-11-30 NOTE — TELEPHONE ENCOUNTER
Received My-Chart response, pt is interested in both talk therapy and med mgmt, prefers female provider and  [facility] and [facility] [address] location.  Writer added pt to wait-list accordingly

## 2024-01-22 DIAGNOSIS — G43.909 MIGRAINES: ICD-10-CM

## 2024-01-23 RX ORDER — NORTRIPTYLINE HYDROCHLORIDE 25 MG/1
25 CAPSULE ORAL DAILY
Qty: 90 CAPSULE | Refills: 0 | Status: SHIPPED | OUTPATIENT
Start: 2024-01-23 | End: 2025-01-18

## 2024-02-07 PROBLEM — G89.29 CHRONIC NONINTRACTABLE HEADACHE: Status: ACTIVE | Noted: 2023-06-18

## 2024-02-07 PROBLEM — R51.9 CHRONIC NONINTRACTABLE HEADACHE: Status: ACTIVE | Noted: 2023-06-18

## 2024-02-12 ENCOUNTER — OFFICE VISIT (OUTPATIENT)
Dept: FAMILY MEDICINE CLINIC | Facility: CLINIC | Age: 24
End: 2024-02-12
Payer: COMMERCIAL

## 2024-02-12 VITALS
HEIGHT: 63 IN | DIASTOLIC BLOOD PRESSURE: 60 MMHG | BODY MASS INDEX: 41.92 KG/M2 | HEART RATE: 106 BPM | SYSTOLIC BLOOD PRESSURE: 100 MMHG | OXYGEN SATURATION: 97 % | WEIGHT: 236.6 LBS | TEMPERATURE: 98.4 F

## 2024-02-12 DIAGNOSIS — G89.29 CHRONIC NONINTRACTABLE HEADACHE, UNSPECIFIED HEADACHE TYPE: Primary | ICD-10-CM

## 2024-02-12 DIAGNOSIS — Z91.89 AT RISK FOR SLEEP APNEA: ICD-10-CM

## 2024-02-12 DIAGNOSIS — R51.9 CHRONIC NONINTRACTABLE HEADACHE, UNSPECIFIED HEADACHE TYPE: Primary | ICD-10-CM

## 2024-02-12 DIAGNOSIS — Z01.84 IMMUNITY STATUS TESTING: ICD-10-CM

## 2024-02-12 PROCEDURE — 99214 OFFICE O/P EST MOD 30 MIN: CPT

## 2024-02-12 RX ORDER — SODIUM FLUORIDE 6 MG/ML
PASTE, DENTIFRICE DENTAL
COMMUNITY
Start: 2024-01-22

## 2024-02-12 RX ORDER — SERTRALINE HYDROCHLORIDE 25 MG/1
TABLET, FILM COATED ORAL
COMMUNITY
Start: 2024-01-23

## 2024-02-12 NOTE — PROGRESS NOTES
"Name: Ruthy Farley      : 2000      MRN: [id number]  Encounter Provider: Bessie Gallardo MD  Encounter Date: 2024   Encounter department: [facility]    Assessment & Plan     23 year old female with headaches previously on nortriptyline, but refractory to said treatment. STOP-BANG >3 with daytime fatigue, snoring, BMI, neck circumference. Patient at risk for sleep apnea. Likely underlying cause of frequent headaches. Unclear if headaches are 2/2 possible sleep apnea vs migraines, although, no aura, N/V or associating factors suggesting migraines.    Will test for sleep apnea.  Advised to continue using Excedrin for headache relief. Will consider alternative pain relief if necessary and appropriate.      1. Chronic nonintractable headache, unspecified headache type  -     Ambulatory Referral to Sleep Medicine; Future    2. Immunity status testing  -     Measles/Mumps/Rubella Immunity; Future  -     Hep B Surface Ab, Ql; Future  -     Varicella zoster antibody, IgG; Future    3. At risk for sleep apnea  -     Ambulatory Referral to Sleep Medicine; Future           Subjective      23 year old female with PMHx of headaches previously on Nortriptyline presenting to the clinic with complaints of ongoing headache. Patient states that nortriptyline never helped her headaches and is currently not taking it. She states that she usually gets her headaches in the morning and are alleviated with Excedrin. Her headaches are usually on her right temporal area; denies accompanying nausea, vomiting, photophobia or aura. She does state that she occasionally has headaches \"all over her head\". She has noticed that stress makes it worse.  She has been told that she might grind her teeth at night. Reports snoring, but denies gasping for air at night per her wife. Patient also feels tired and fatigue throughout the day most days.    Of note, patient recently started Zoloft less than " "a month ago. She is currently following with [facility].    No other concerns.            Headache - Recurrent or Known Dx Migraines  Associated symptoms include headaches. Pertinent negatives include no chills, fatigue, fever, numbness, vomiting or weakness.     Review of Systems   Constitutional:  Negative for chills, fatigue, fever and unexpected weight change.   HENT:  Negative for sinus pressure.    Eyes:  Negative for photophobia and visual disturbance.   Respiratory:  Negative for shortness of breath.    Cardiovascular:  Negative for palpitations.   Gastrointestinal:  Negative for diarrhea and vomiting.   Neurological:  Positive for headaches. Negative for dizziness, syncope, weakness, light-headedness and numbness.       Current Outpatient Medications on File Prior to Visit   Medication Sig    nortriptyline (PAMELOR) 25 mg capsule Take 1 capsule (25 mg total) by mouth daily    sertraline (ZOLOFT) 25 mg tablet TAKE ONE TABLET BY MOUTH EVERY DAY FOR DEPRESSION, ANXIETY AND OCD SYMPTOMS    Sodium Fluoride 5000 PPM 1.1 % PSTE BRUSH THOROUGHLY ONCE DAILY PREFERABLY AT BEDTIME FOR 2 MINUTES, EXPECTORATE AFTER USE, DO NOT RINSE.    nystatin (MYCOSTATIN) powder Apply topically 3 (three) times a day for 14 days       Objective     /60 (BP Location: Right arm, Patient Position: Sitting, Cuff Size: Large)   Pulse (!) 106   Temp 98.4 °F (36.9 °C) (Tympanic)   Ht 5' 3\" (1.6 m)   Wt 107 kg (236 lb 9.6 oz)   SpO2 97%   BMI 41.91 kg/m²     Physical Exam  Constitutional:       General: She is not in acute distress.     Appearance: Normal appearance. She is obese. She is not ill-appearing.   HENT:      Head: Normocephalic and atraumatic.      Comments: No temporal tenderness  Cardiovascular:      Rate and Rhythm: Normal rate and regular rhythm.      Pulses: Normal pulses.      Heart sounds: Normal heart sounds.   Pulmonary:      Effort: Pulmonary effort is normal. No respiratory " distress.      Breath sounds: Normal breath sounds.   Abdominal:      General: Abdomen is flat. Bowel sounds are normal.      Palpations: Abdomen is soft.      Tenderness: There is no abdominal tenderness.   Musculoskeletal:      Right lower leg: No edema.      Left lower leg: No edema.   Skin:     Capillary Refill: Capillary refill takes less than 2 seconds.   Neurological:      Mental Status: She is alert.       Bessie Gallardo MD

## 2024-02-13 DIAGNOSIS — Z91.89 AT RISK FOR SLEEP APNEA: ICD-10-CM

## 2024-02-13 DIAGNOSIS — R51.9 CHRONIC NONINTRACTABLE HEADACHE, UNSPECIFIED HEADACHE TYPE: Primary | ICD-10-CM

## 2024-02-13 DIAGNOSIS — G89.29 CHRONIC NONINTRACTABLE HEADACHE, UNSPECIFIED HEADACHE TYPE: Primary | ICD-10-CM

## 2024-02-16 ENCOUNTER — TELEPHONE (OUTPATIENT)
Dept: PSYCHIATRY | Facility: CLINIC | Age: 24
End: 2024-02-16

## 2024-02-16 NOTE — TELEPHONE ENCOUNTER
"Behavioral Health Outpatient Intake Questions    Referred By   : [facility] [place]    Please advise interviewee that they need to answer all questions truthfully to allow for best care, and any misrepresentations of information may affect their ability to be seen at this clinic   => Was this discussed? Yes     If Minor Child (under age 18)    Who is/are the legal guardian(s) of the child?     Is there a custody agreement?      If \"YES\"- Custody orders must be obtained prior to scheduling the first appointment  In addition, Consent to Treatment must be signed by all legal guardians prior to scheduling the first appointment    If \"NO\"- Consent to Treatment must be signed by all legal guardians prior to scheduling the first appointment    Behavioral Health Outpatient Intake History -     Presenting Problem (in patient's own words): Anxiety, Depression & OCD; pt recently started seeing a psychiatry doctor, but not working out with provider and medication not helping; pt would also like to be assessed for ADHD - pt grew up in [place] and has never been evaluated    Are there any communication barriers for this patient?     No                                               If yes, please describe barriers:   If there is a unique situation, please refer to Mikael Whitt/Margy Stallworth for final determination.    Are you taking any psychiatric medications? Yes     If \"YES\" -What are they Zoloft     If \"YES\" -Who prescribes? Juan Sterling    Has the Patient previously received outpatient Talk Therapy or Medication Management from [facility]  No        If \"YES\"- When, Where and with Whom?         If \"NO\" -Has Patient received these services elsewhere?       If \"YES\" -When, Where, and with Whom? [facility]     Has the Patient abused alcohol or other substances in the last 6 months ? No  No concerns of substance abuse are reported.     If \"YES\" -What substance, How much, How " "often?     If illegal substance: Refer to [facility] (for YOVANY) or [facility].   If Alcohol in excess of 10 drinks per week:  Refer to [facility] (for YOVANY) or [facility]    Legal History-     Is this treatment court ordered? No   If \"yes \"send to :  Talk Therapy : Send to Mikael Whitt/Margy Stallworth for final determination   Med Management: Send to Dr Viramontes for final determination     Has the Patient been convicted of a felony?  No   If \"Yes\" send to -When, What?  Talk Therapy : Send to Mikael Stallworth for final determination   Med Management: Send to Dr Viramontes for final determination     ACCEPTED as a patient Yes  If \"Yes\" Appointment Date: with Dr Marcia Gross  Monday, March 25, 2024 @ 2:00pm  Monday, April 22, 2024 @ 11:30am    Referred Elsewhere? No  If “Yes” - (Where? Ex: [facility], [facility], [facility], [facility], etc.)       Name of Insurance Co: [employer]  Insurance ID# [id number]  Insurance Phone #   If ins is primary or secondary? primary  If patient is a minor, parents information such as Name, D.O.B of guarantor.    RTE for appts can be ran as of 2/29/2024.  "

## 2024-03-25 ENCOUNTER — OFFICE VISIT (OUTPATIENT)
Dept: PSYCHIATRY | Facility: CLINIC | Age: 24
End: 2024-03-25
Payer: COMMERCIAL

## 2024-03-25 VITALS
HEART RATE: 95 BPM | BODY MASS INDEX: 51.91 KG/M2 | WEIGHT: 293 LBS | SYSTOLIC BLOOD PRESSURE: 125 MMHG | DIASTOLIC BLOOD PRESSURE: 56 MMHG | HEIGHT: 63 IN

## 2024-03-25 DIAGNOSIS — F42.2 MIXED OBSESSIONAL THOUGHTS AND ACTS: Primary | ICD-10-CM

## 2024-03-25 DIAGNOSIS — F33.2 SEVERE EPISODE OF RECURRENT MAJOR DEPRESSIVE DISORDER, WITHOUT PSYCHOTIC FEATURES (HCC): ICD-10-CM

## 2024-03-25 DIAGNOSIS — Z79.899 HIGH RISK MEDICATION USE: ICD-10-CM

## 2024-03-25 DIAGNOSIS — G89.29 CHRONIC NONINTRACTABLE HEADACHE, UNSPECIFIED HEADACHE TYPE: ICD-10-CM

## 2024-03-25 DIAGNOSIS — F41.1 GENERALIZED ANXIETY DISORDER: ICD-10-CM

## 2024-03-25 DIAGNOSIS — R51.9 CHRONIC NONINTRACTABLE HEADACHE, UNSPECIFIED HEADACHE TYPE: ICD-10-CM

## 2024-03-25 PROBLEM — F32.2 CURRENT SEVERE EPISODE OF MAJOR DEPRESSIVE DISORDER WITHOUT PSYCHOTIC FEATURES (HCC): Status: ACTIVE | Noted: 2024-03-25

## 2024-03-25 PROCEDURE — 90792 PSYCH DIAG EVAL W/MED SRVCS: CPT | Performed by: NURSE PRACTITIONER

## 2024-03-25 RX ORDER — FLUVOXAMINE MALEATE 50 MG/1
50 TABLET, COATED ORAL 2 TIMES DAILY
Qty: 60 TABLET | Refills: 3 | Status: SHIPPED | OUTPATIENT
Start: 2024-03-25

## 2024-04-02 NOTE — BH TREATMENT PLAN
TREATMENT PLAN (Medication Management Only)        [facility]    Name and Date of Birth:  Ruthy Farley 23 y.o. 2000  Date of Treatment Plan: March 25, 2024  Diagnosis/Diagnoses:    1. Mixed obsessional thoughts and acts    2. Severe episode of recurrent major depressive disorder, without psychotic features (HCC)    3. Generalized anxiety disorder    4. Chronic nonintractable headache, unspecified headache type    5. High risk medication use      Strengths/Personal Resources for Self-Care: supportive family, taking medications as prescribed, ability to communicate needs, ability to listen.  Area/Areas of need (in own words): anxiety, depression, obsessive-compulsive symptoms  1. Long Term Goal: improve control of obsessive thoughts and compulsions  Target Date:6 months - 10/2/2024  Person/Persons responsible for completion of goal: Ruthy, provider and therapist, family  2.  Short Term Objective (s) - How will we reach this goal?:   A. Provider new recommended medication/dosage changes and/or continue medication(s): continue current medications as prescribed.  B.  Therapy .  C.  Self care adequate sleep .  Target Date:6 months - 10/2/2024  Person/Persons Responsible for Completion of Goal: Ruthy, provider and therapist, family  Progress Towards Goals: initiating treatment  Treatment Modality: medication management every 3 months  Review due 180 days from date of this plan: 6 months - 10/2/2024  Expected length of service:  Ongoing  My Physician/PA/NP and I have developed this plan together and I agree to work on the goals and objectives. I understand the treatment goals that were developed for my treatment.
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
## 2024-04-02 NOTE — BH CRISIS PLAN
Client Name: Ruthy Farley       Client YOB: 2000    HusseinMckinley Safety Plan      Creation Date: 3/25/24 Update Date: 3/25/24   Created By: Marcia Gross, PhD       Step 1: Warning Signs:   Warning Signs   no            Step 2: Internal Coping Strategies:   Internal Coping Strategies   walk, to gym, day dream, talk to wife TV   sweet to eat and drink tea.            Step 3: People and social settings that provide distraction:   Name Contact Information   gym     Places   gym           Step 4: People whom I can ask for help during a crisis:      Name Contact Information    wife cell      Step 5: Professionals or agencies I can contact during a crisis:      Clinican/Agency Name Phone Emergency Contact    [facility] [phone] Dr. Gross    [facility]  Sue Adan      [facility] Emergency [facility] Emergency Department Phone Emergency Department Address    [place]          Crisis Phone Numbers:   Suicide Prevention Lifeline: Call or Text  483 Crisis Text Line: Text HOME to 166-305   Please note: Some [facility] do not have a separate number for Child/Adolescent specific crisis. If your county is not listed under Child/Adolescent, please call the adult number for your county      Adult Crisis Numbers: Child/Adolescent Crisis Numbers   [facility]: [phone] [facility]: [phone]   [facility]: [phone] [facility]: [phone]   [facility]: [phone] [place]: [phone]   [facility]: [phone] [place]: [phone]   [facility]: [phone]   [facility]: [phone]   [facility]: [phone]   [facility]: [phone] (daytime) [phone] (after hours, weekends, holidays)      Step 6: Making the environment safer (plan for lethal means safety):   Patient did not identify any lethal methods: Yes     Optional: What is most important to me and worth living for?    Wife, hope that things will be better one day. Not suicidal.      Jos [facility] Safety Plan. Shannen Savage and Rubio Sen. Used with permission of the authors.

## 2024-04-22 ENCOUNTER — OFFICE VISIT (OUTPATIENT)
Dept: PSYCHIATRY | Facility: CLINIC | Age: 24
End: 2024-04-22
Payer: COMMERCIAL

## 2024-04-22 ENCOUNTER — LAB (OUTPATIENT)
Dept: LAB | Facility: CLINIC | Age: 24
End: 2024-04-22
Payer: COMMERCIAL

## 2024-04-22 VITALS
WEIGHT: 239 LBS | HEIGHT: 63 IN | HEART RATE: 84 BPM | DIASTOLIC BLOOD PRESSURE: 83 MMHG | BODY MASS INDEX: 42.35 KG/M2 | SYSTOLIC BLOOD PRESSURE: 113 MMHG

## 2024-04-22 DIAGNOSIS — Z79.899 HIGH RISK MEDICATION USE: ICD-10-CM

## 2024-04-22 DIAGNOSIS — F41.1 GENERALIZED ANXIETY DISORDER: Primary | ICD-10-CM

## 2024-04-22 DIAGNOSIS — G43.909 MIGRAINES: ICD-10-CM

## 2024-04-22 DIAGNOSIS — F33.2 SEVERE EPISODE OF RECURRENT MAJOR DEPRESSIVE DISORDER, WITHOUT PSYCHOTIC FEATURES (HCC): ICD-10-CM

## 2024-04-22 DIAGNOSIS — F42.2 MIXED OBSESSIONAL THOUGHTS AND ACTS: ICD-10-CM

## 2024-04-22 LAB
25(OH)D3 SERPL-MCNC: 12.3 NG/ML (ref 30–100)
BASOPHILS # BLD AUTO: 0.09 THOUSANDS/ÂΜL (ref 0–0.1)
BASOPHILS NFR BLD AUTO: 1 % (ref 0–1)
EOSINOPHIL # BLD AUTO: 0.21 THOUSAND/ÂΜL (ref 0–0.61)
EOSINOPHIL NFR BLD AUTO: 2 % (ref 0–6)
ERYTHROCYTE [DISTWIDTH] IN BLOOD BY AUTOMATED COUNT: 13.9 % (ref 11.6–15.1)
FERRITIN SERPL-MCNC: 11 NG/ML (ref 11–307)
HCT VFR BLD AUTO: 42.5 % (ref 34.8–46.1)
HGB BLD-MCNC: 12.9 G/DL (ref 11.5–15.4)
IMM GRANULOCYTES # BLD AUTO: 0.05 THOUSAND/UL (ref 0–0.2)
IMM GRANULOCYTES NFR BLD AUTO: 1 % (ref 0–2)
LYMPHOCYTES # BLD AUTO: 2.31 THOUSANDS/ÂΜL (ref 0.6–4.47)
LYMPHOCYTES NFR BLD AUTO: 22 % (ref 14–44)
MCH RBC QN AUTO: 25.1 PG (ref 26.8–34.3)
MCHC RBC AUTO-ENTMCNC: 30.4 G/DL (ref 31.4–37.4)
MCV RBC AUTO: 83 FL (ref 82–98)
MONOCYTES # BLD AUTO: 0.73 THOUSAND/ÂΜL (ref 0.17–1.22)
MONOCYTES NFR BLD AUTO: 7 % (ref 4–12)
NEUTROPHILS # BLD AUTO: 7.33 THOUSANDS/ÂΜL (ref 1.85–7.62)
NEUTS SEG NFR BLD AUTO: 67 % (ref 43–75)
NRBC BLD AUTO-RTO: 0 /100 WBCS
PLATELET # BLD AUTO: 332 THOUSANDS/UL (ref 149–390)
PMV BLD AUTO: 9.6 FL (ref 8.9–12.7)
RBC # BLD AUTO: 5.14 MILLION/UL (ref 3.81–5.12)
TSH SERPL DL<=0.05 MIU/L-ACNC: 1.75 UIU/ML (ref 0.45–4.5)
VIT B12 SERPL-MCNC: 107 PG/ML (ref 180–914)
WBC # BLD AUTO: 10.72 THOUSAND/UL (ref 4.31–10.16)

## 2024-04-22 PROCEDURE — 80053 COMPREHEN METABOLIC PANEL: CPT

## 2024-04-22 PROCEDURE — 90833 PSYTX W PT W E/M 30 MIN: CPT | Performed by: NURSE PRACTITIONER

## 2024-04-22 PROCEDURE — 36415 COLL VENOUS BLD VENIPUNCTURE: CPT

## 2024-04-22 PROCEDURE — 83550 IRON BINDING TEST: CPT

## 2024-04-22 PROCEDURE — 85025 COMPLETE CBC W/AUTO DIFF WBC: CPT

## 2024-04-22 PROCEDURE — 80061 LIPID PANEL: CPT

## 2024-04-22 PROCEDURE — 82306 VITAMIN D 25 HYDROXY: CPT

## 2024-04-22 PROCEDURE — 84443 ASSAY THYROID STIM HORMONE: CPT

## 2024-04-22 PROCEDURE — 83540 ASSAY OF IRON: CPT

## 2024-04-22 PROCEDURE — 82607 VITAMIN B-12: CPT

## 2024-04-22 PROCEDURE — 82728 ASSAY OF FERRITIN: CPT

## 2024-04-22 PROCEDURE — 86376 MICROSOMAL ANTIBODY EACH: CPT

## 2024-04-22 PROCEDURE — 99214 OFFICE O/P EST MOD 30 MIN: CPT | Performed by: NURSE PRACTITIONER

## 2024-04-22 PROCEDURE — 86800 THYROGLOBULIN ANTIBODY: CPT

## 2024-04-22 RX ORDER — AMITRIPTYLINE HYDROCHLORIDE 10 MG/1
20 TABLET, FILM COATED ORAL
Qty: 60 TABLET | Refills: 0 | Status: SHIPPED | OUTPATIENT
Start: 2024-04-22 | End: 2024-05-17

## 2024-04-22 RX ORDER — FLUVOXAMINE MALEATE 50 MG/1
100 TABLET, COATED ORAL 2 TIMES DAILY
Qty: 60 TABLET | Refills: 3 | Status: SHIPPED | OUTPATIENT
Start: 2024-04-22 | End: 2024-04-25

## 2024-04-22 NOTE — PSYCH
Visit Time    Visit Start Time: 11:30   Visit Stop Time: 12:00  Total Visit Duration:  30 minutes    Subjective:     Patient ID: Ruthy Farley is a 24 y.o. female.  History of OCD, anxiety and depression seen for medication management and mood assessment. Ruthy reports she feels about the same medication has not changed her anxiety or intrusive thoughts she reports she is eating and sleeping, wife is supportive.  The Luvox was increased to 50 mg 2 tabs in the morning and 2 at night.  She agreed.  She reports functioning and will obtain her lab work.  She is social and denies suicidal ideation    HPI ROS Appetite Changes and Sleep: normal appetite and normal energy level    Review Of Systems:     Mood Anxiety and Depression   Behavior Normal    Thought Content Normal   General Relationship Problems and Emotional Problems   Personality Normal   Other Psych Symptoms Normal   Constitutional As Noted in HPI   ENT As Noted in HPI   Cardiovascular As Noted in HPI   Respiratory As Noted in HPI   Gastrointestinal As Noted in HPI   Genitourinary As Noted in HPI   Musculoskeletal As Noted in HPI   Integumentary As Noted in HPI   Neurological As Noted in HPI   Endocrine Normal    Other Symptoms Normal        Laboratory Results:     Substance Abuse History:  Social History     Substance and Sexual Activity   Drug Use Not Currently       Family Psychiatric History:   Family History   Problem Relation Age of Onset    Diabetes Father     No Known Problems Brother     Breast cancer Neg Hx     Colon cancer Neg Hx     Ovarian cancer Neg Hx        The following portions of the patient's history were reviewed and updated as appropriate: allergies, current medications, past family history, past medical history, past social history, past surgical history, and problem list.    Social History     Socioeconomic History    Marital status: Single     Spouse name: Not on file    Number of children: Not on file    Years of  education: Not on file    Highest education level: Not on file   Occupational History    Occupation: Healthcare Coordinator   Tobacco Use    Smoking status: Former     Current packs/day: 0.00     Types: Cigarettes     Quit date: 2022     Years since quittin.2    Smokeless tobacco: Never   Vaping Use    Vaping status: Never Used   Substance and Sexual Activity    Alcohol use: Yes     Comment: socially    Drug use: Not Currently    Sexual activity: Yes     Partners: Female     Birth control/protection: None   Other Topics Concern    Not on file   Social History Narrative    Not on file     Social Determinants of Health     Financial Resource Strain: Low Risk  (2022)    Overall Financial Resource Strain (CARDIA)     Difficulty of Paying Living Expenses: Not hard at all   Food Insecurity: No Food Insecurity (2022)    Hunger Vital Sign     Worried About Running Out of Food in the Last Year: Never true     Ran Out of Food in the Last Year: Never true   Transportation Needs: No Transportation Needs (2022)    PRAPARE - Transportation     Lack of Transportation (Medical): No     Lack of Transportation (Non-Medical): No   Physical Activity: Not on file   Stress: No Stress Concern Present (2022)    [employer] [place] [employer] - Occupational Stress Questionnaire     Feeling of Stress : Not at all   Social Connections: Moderately Isolated (10/4/2022)    Received from [facility]    Social Connection and Isolation Panel [NHANES]     Frequency of Communication with Friends and Family: Three times a week     Frequency of Social Gatherings with Friends and Family: More than three times a week     Attends Anglican Services: Never     Active Member of Clubs or Organizations: No     Attends Club or Organization Meetings: Never     Marital Status:    Intimate Partner Violence: Not At Risk (2022)    Humiliation, Afraid, Rape, and Kick questionnaire     Fear of Current  or Ex-Partner: No     Emotionally Abused: No     Physically Abused: No     Sexually Abused: No   Housing Stability: Low Risk  (12/5/2022)    Housing Stability Vital Sign     Unable to Pay for Housing in the Last Year: No     Number of Places Lived in the Last Year: 2     Unstable Housing in the Last Year: No     Social History     Social History Narrative    Not on file       Objective:       Mental status:  Appearance calm and cooperative , adequate hygiene and grooming, and good eye contact    Mood depressed and anxious   Affect affect appropriate    Speech a normal rate   Thought Processes normal thought processes   Hallucinations no hallucinations present    Thought Content no delusions   Abnormal Thoughts no suicidal thoughts  and no homicidal thoughts    Orientation  oriented to person and place and time   Remote Memory short term memory intact and long term memory intact   Attention Span concentration intact   Intellect Appears to be of Average Intelligence   Insight Insight intact   Judgement judgment was intact   Muscle Strength Muscle strength and tone were normal   Language no difficulty naming common objects   Fund of Knowledge displays adequate knowledge of current events   Pain none   Pain Scale 0       Assessment/Plan:       Diagnoses and all orders for this visit:    Generalized anxiety disorder    Mixed obsessional thoughts and acts  -     fluvoxaMINE (LUVOX) 50 mg tablet; Take 2 tablets (100 mg total) by mouth 2 (two) times a day    Severe episode of recurrent major depressive disorder, without psychotic features (HCC)            Treatment Recommendations- Risks Benefits      Immediate Medical/Psychiatric/Psychotherapy Treatments and Any Precautions: Continue treatment plan increase Luvox 50 mg 2 tablets in the morning and p.m.    Risks, Benefits And Possible Side Effects Of Medications:  {PSYCH RISK, BENEFITS AND POSSIBLE SIDE EFFECTS (Optional):34793       Psychotherapy Provided: 30 Individual  psychotherapy provided.   Supportive therapy  Medication evaluation  Mood assessment  Medication education    Goals discussed in session: Stable mood reduce OCD

## 2024-04-23 LAB
ALBUMIN SERPL BCP-MCNC: 4.2 G/DL (ref 3.5–5)
ALP SERPL-CCNC: 98 U/L (ref 34–104)
ALT SERPL W P-5'-P-CCNC: 19 U/L (ref 7–52)
ANION GAP SERPL CALCULATED.3IONS-SCNC: 6 MMOL/L (ref 4–13)
AST SERPL W P-5'-P-CCNC: 20 U/L (ref 13–39)
BILIRUB SERPL-MCNC: 0.35 MG/DL (ref 0.2–1)
BUN SERPL-MCNC: 12 MG/DL (ref 5–25)
CALCIUM SERPL-MCNC: 9 MG/DL (ref 8.4–10.2)
CHLORIDE SERPL-SCNC: 102 MMOL/L (ref 96–108)
CHOLEST SERPL-MCNC: 186 MG/DL
CO2 SERPL-SCNC: 28 MMOL/L (ref 21–32)
CREAT SERPL-MCNC: 0.75 MG/DL (ref 0.6–1.3)
GFR SERPL CREATININE-BSD FRML MDRD: 112 ML/MIN/1.73SQ M
GLUCOSE P FAST SERPL-MCNC: 84 MG/DL (ref 65–99)
HDLC SERPL-MCNC: 41 MG/DL
IRON SATN MFR SERPL: 16 % (ref 15–50)
IRON SERPL-MCNC: 71 UG/DL (ref 50–212)
LDLC SERPL CALC-MCNC: 99 MG/DL (ref 0–100)
NONHDLC SERPL-MCNC: 145 MG/DL
POTASSIUM SERPL-SCNC: 4.4 MMOL/L (ref 3.5–5.3)
PROT SERPL-MCNC: 7.4 G/DL (ref 6.4–8.4)
SODIUM SERPL-SCNC: 136 MMOL/L (ref 135–147)
THYROGLOB AB SERPL-ACNC: <1 IU/ML (ref 0–0.9)
THYROPEROXIDASE AB SERPL-ACNC: <9 IU/ML (ref 0–34)
TIBC SERPL-MCNC: 455 UG/DL (ref 250–450)
TRIGL SERPL-MCNC: 231 MG/DL
UIBC SERPL-MCNC: 384 UG/DL (ref 155–355)

## 2024-04-25 ENCOUNTER — TELEPHONE (OUTPATIENT)
Dept: PSYCHIATRY | Facility: CLINIC | Age: 24
End: 2024-04-25

## 2024-04-25 DIAGNOSIS — F42.2 MIXED OBSESSIONAL THOUGHTS AND ACTS: ICD-10-CM

## 2024-04-25 DIAGNOSIS — E55.9 VITAMIN D INSUFFICIENCY: Primary | ICD-10-CM

## 2024-04-25 RX ORDER — FLUVOXAMINE MALEATE 50 MG/1
50 TABLET, COATED ORAL 2 TIMES DAILY
Qty: 60 TABLET | Refills: 3 | Status: SHIPPED | OUTPATIENT
Start: 2024-04-25

## 2024-04-25 RX ORDER — ERGOCALCIFEROL 1.25 MG/1
50000 CAPSULE ORAL WEEKLY
Qty: 12 CAPSULE | Refills: 0 | Status: SHIPPED | OUTPATIENT
Start: 2024-04-25

## 2024-04-25 NOTE — RESULT ENCOUNTER NOTE
Telephone call to Ruthy regarding abnormal lab values.  Vitamin D 50,000 international units ordered weekly for 12 weeks.  Discussed with her that we will defer how her PCP would like B12 and iron and lipid panels addressed.

## 2024-04-25 NOTE — PROGRESS NOTES
Reduce Luvox to 50 mg twice daily due to patient complained of increased anxiety and insomnia with 100 mg twice daily.  Will evaluate next session

## 2024-04-25 NOTE — TELEPHONE ENCOUNTER
Received patient stating that she started taking Luvox on Monday and ever since she has had body shakes, heightened anxiety, and her sleep patterns have changed as to not sleeping thru night, sleeplessness.  Patient would like to know what her next step is, continue with medication or stop it or change medication?  Please advise patient for further instructions.  Patient's phone number and pharmacy verified.

## 2024-05-09 ENCOUNTER — RA CDI HCC (OUTPATIENT)
Dept: OTHER | Facility: HOSPITAL | Age: 24
End: 2024-05-09

## 2024-05-09 NOTE — PROGRESS NOTES
Please review if this dx  is applicable to the patient's condition and assess and document, if applicable in next visit         HCC coding opportunities          Chart Reviewed number of suggestions sent to Provider: 1     Patients Insurance        Commercial Insurance: [employer]

## 2024-05-13 ENCOUNTER — TELEPHONE (OUTPATIENT)
Dept: FAMILY MEDICINE CLINIC | Facility: CLINIC | Age: 24
End: 2024-05-13

## 2024-05-13 ENCOUNTER — OFFICE VISIT (OUTPATIENT)
Dept: FAMILY MEDICINE CLINIC | Facility: CLINIC | Age: 24
End: 2024-05-13
Payer: COMMERCIAL

## 2024-05-13 VITALS
TEMPERATURE: 97.4 F | WEIGHT: 244.6 LBS | DIASTOLIC BLOOD PRESSURE: 72 MMHG | HEIGHT: 63 IN | BODY MASS INDEX: 43.34 KG/M2 | OXYGEN SATURATION: 98 % | HEART RATE: 86 BPM | SYSTOLIC BLOOD PRESSURE: 110 MMHG

## 2024-05-13 DIAGNOSIS — Z91.89 RISK FACTORS FOR OBSTRUCTIVE SLEEP APNEA: Primary | ICD-10-CM

## 2024-05-13 DIAGNOSIS — F33.2 SEVERE EPISODE OF RECURRENT MAJOR DEPRESSIVE DISORDER, WITHOUT PSYCHOTIC FEATURES (HCC): ICD-10-CM

## 2024-05-13 DIAGNOSIS — E53.8 VITAMIN B12 DEFICIENCY: ICD-10-CM

## 2024-05-13 DIAGNOSIS — Z91.89 AT RISK FOR SLEEP APNEA: ICD-10-CM

## 2024-05-13 DIAGNOSIS — E55.9 VITAMIN D DEFICIENCY: ICD-10-CM

## 2024-05-13 DIAGNOSIS — G43.709 CHRONIC MIGRAINE WITHOUT AURA WITHOUT STATUS MIGRAINOSUS, NOT INTRACTABLE: ICD-10-CM

## 2024-05-13 DIAGNOSIS — Z00.00 ANNUAL PHYSICAL EXAM: Primary | ICD-10-CM

## 2024-05-13 DIAGNOSIS — E78.1 HYPERTRIGLYCERIDEMIA: ICD-10-CM

## 2024-05-13 PROCEDURE — 99395 PREV VISIT EST AGE 18-39: CPT

## 2024-05-13 RX ORDER — LANOLIN ALCOHOL/MO/W.PET/CERES
1000 CREAM (GRAM) TOPICAL DAILY
Qty: 30 TABLET | Refills: 0 | Status: SHIPPED | OUTPATIENT
Start: 2024-05-13 | End: 2024-05-15 | Stop reason: ALTCHOICE

## 2024-05-13 RX ORDER — PROPRANOLOL HYDROCHLORIDE 40 MG/1
40 TABLET ORAL 2 TIMES DAILY
Qty: 60 TABLET | Refills: 0 | Status: SHIPPED | OUTPATIENT
Start: 2024-05-13

## 2024-05-13 NOTE — ASSESSMENT & PLAN NOTE
Lab Results   Component Value Date    [id number] 107 (L) 04/22/2024     Hgb 12.9, MCV 83  Patient endorses fatigue - no reported numbness or tingling in extremities or memory deficits    Plan:  2,000 mcg B12 daily   Plan to follow up with repeat CBC and Vit B12 in 8 weeks  - also discussed B12 injections for supplementation

## 2024-05-13 NOTE — TELEPHONE ENCOUNTER
Ernestina Romero Auth called in stating her sleep study was denied by insurance If provider could change order to a home study it does not require a Prior Auth Thank you

## 2024-05-13 NOTE — ASSESSMENT & PLAN NOTE
Lab Results   Component Value Date    CHOLESTEROL 186 04/22/2024    TRIG 231 (H) 04/22/2024    HDL 41 (L) 04/22/2024    LDLCALC 99 04/22/2024     Elevation of triglycerides and decreased HDL noted on lipid panel  Patient has been working with lifestyle modifications, but expresses challenges in modifying her diet while balancing her mental health  -She reports that dietary modifications can be quite triggering for her mental health    Plan:  -Discussed lifestyle modifications in the setting of her goals for wellbeing  -Provided some nutritional counseling regarding increasing healthy fats through omega 3 fatty acids and limiting carb-rich food  -Discussed the benefits of regular exercise  -Encouraged patient to discuss challenges with dietary modifications with her psychiatrist in addition to our continued conversation  -Provided referral to nutrition services to aid patient in creating healthy dietary modifications  -Medication management is not necessary at this time, but options for future medication management were discussed

## 2024-05-13 NOTE — ASSESSMENT & PLAN NOTE
Headaches occurring 1-2 times per week   - Patient currently taking amitriptyline 20 mg daily  - Excedrin used on an as-needed basis as abortive therapy    Plan:  Continue amitriptyline 20 mg daily  Start propranolol 40 mg twice daily  Continue Excedrin on as-needed basis as abortive therapy  May consider OMT as adjunctive therapy for migraines  Follow-up in 4-6 weeks

## 2024-05-13 NOTE — ASSESSMENT & PLAN NOTE
Patient endorses daytime fatigue despite 7 to 8 hours of sleep  -Witnessed snoring and gasping noted by patient's wife  -Patient requesting home sleep study for insurance purposes    Plan:  Referral for home sleep study provided

## 2024-05-13 NOTE — ASSESSMENT & PLAN NOTE
PHQ-9 of 16  No SI or HI  Support system: wife    Plan:  Continue follow-up with psychiatry for talk therapy and medication management  Continue fluvoxamine 50 mg BID  Will follow up in 4-6 weeks

## 2024-05-13 NOTE — ASSESSMENT & PLAN NOTE
Vitamin D of 12.3 on 4/22/24  Patient is currently taking 50,000 U of Vitamin D2 weekly for a 12-week course    Plan:  Continue 12-week course of weekly vitamin D supplementation  Plan to repeat Vitamin D level once weekly supplementation is completed

## 2024-05-13 NOTE — PROGRESS NOTES
[facility]  [facility]    NAME: Ruthy Farley  AGE: 24 y.o. SEX: female  : 2000     DATE: 5/15/2024     Assessment and Plan:     Problem List Items Addressed This Visit       At risk for sleep apnea     Patient endorses daytime fatigue despite 7 to 8 hours of sleep  -Witnessed snoring and gasping noted by patient's wife  -Patient requesting home sleep study for insurance purposes    Plan:  Referral for home sleep study provided         Relevant Orders    Ambulatory Referral to Sleep Medicine    Current severe episode of major depressive disorder without psychotic features (HCC)     PHQ-9 of 16  No SI or HI  Support system: wife    Plan:  Continue follow-up with psychiatry for talk therapy and medication management  Continue fluvoxamine 50 mg BID  Will follow up in 4-6 weeks           Vitamin B12 deficiency     Lab Results   Component Value Date    [id number] 107 (L) 2024     Hgb 12.9, MCV 83  Patient endorses fatigue - no reported numbness or tingling in extremities or memory deficits    Plan:  2,000 mcg B12 daily   Plan to follow up with repeat CBC and Vit B12 in 8 weeks  - also discussed B12 injections for supplementation         Relevant Orders    Vitamin B12    CBC and differential    Hypertriglyceridemia     Lab Results   Component Value Date    CHOLESTEROL 186 2024    TRIG 231 (H) 2024    HDL 41 (L) 2024    LDLCALC 99 2024     Elevation of triglycerides and decreased HDL noted on lipid panel  Patient has been working with lifestyle modifications, but expresses challenges in modifying her diet while balancing her mental health  -She reports that dietary modifications can be quite triggering for her mental health    Plan:  -Discussed lifestyle modifications in the setting of her goals for wellbeing  -Provided some nutritional counseling regarding increasing healthy fats through omega 3  fatty acids and limiting carb-rich food  -Discussed the benefits of regular exercise  -Encouraged patient to discuss challenges with dietary modifications with her psychiatrist in addition to our continued conversation  -Provided referral to nutrition services to aid patient in creating healthy dietary modifications  -Medication management is not necessary at this time, but options for future medication management were discussed         Relevant Orders    Ambulatory Referral to Nutrition Services    Chronic migraine without aura without status migrainosus, not intractable     Headaches occurring 1-2 times per week   - Patient currently taking amitriptyline 20 mg daily  - Excedrin used on an as-needed basis as abortive therapy    Plan:  Continue amitriptyline 20 mg daily  Start propranolol 40 mg twice daily  Continue Excedrin on as-needed basis as abortive therapy  May consider OMT as adjunctive therapy for migraines  Follow-up in 4-6 weeks           Relevant Medications    propranolol (INDERAL) 40 mg tablet    Vitamin D deficiency     Vitamin D of 12.3 on 4/22/24  Patient is currently taking 50,000 U of Vitamin D2 weekly for a 12-week course    Plan:  Continue 12-week course of weekly vitamin D supplementation  Plan to repeat Vitamin D level once weekly supplementation is completed          Other Visit Diagnoses       Annual physical exam    -  Primary            Immunizations and preventive care screenings were discussed with patient today. Appropriate education was printed on patient's after visit summary.    Counseling:  Alcohol/drug use: discussed moderation in alcohol intake, the recommendations for healthy alcohol use, and avoidance of illicit drug use.  Dental Health: discussed importance of regular tooth brushing, flossing, and dental visits.  Sexual health: discussed sexually transmitted diseases, partner selection, use of condoms, avoidance of unintended pregnancy, and contraceptive  alternatives.  Exercise: the importance of regular exercise/physical activity was discussed. Recommend exercise 3-5 times per week for at least 30 minutes.       Depression Screening and Follow-up Plan: Patient's depression screening was positive with a PHQ-9 score of 16.         Return in about 4 weeks (around 6/10/2024) for f/u vitamin B12 and migraines.     Chief Complaint:     Chief Complaint   Patient presents with    Physical Exam     Discuss labs    Headache      History of Present Illness:     Adult Annual Physical   Patient here for a comprehensive physical exam. The patient reports migraines. She endorses migraines that occur 1-2 times per week despite her current use of amitriptyline. Excedrin helps to alleviate headaches when they occur. No known triggers at this time. No aura.   Patient also notes dryness on her face, primarily around her mouth.    Diet and Physical Activity  Diet/Nutrition: well balanced diet and consuming 3-5 servings of fruits/vegetables daily, daily junk food (icecream). Expresses concern about effectively modifying her diet. Concerns related to hx of ED.   Exercise: walking, vigorous cardiovascular exercise, 3-4 times a week on average, and 30-60 minutes on average.      Depression Screening  PHQ-2/9 Depression Screening    Little interest or pleasure in doing things: 2 - more than half the days  Feeling down, depressed, or hopeless: 2 - more than half the days  Trouble falling or staying asleep, or sleeping too much: 2 - more than half the days  Feeling tired or having little energy: 3 - nearly every day  Poor appetite or overeatin - more than half the days  Feeling bad about yourself - or that you are a failure or have let yourself or your family down: 2 - more than half the days  Trouble concentrating on things, such as reading the newspaper or watching television: 2 - more than half the days  Moving or speaking so slowly that other people could have noticed. Or the opposite  - being so fidgety or restless that you have been moving around a lot more than usual: 1 - several days  Thoughts that you would be better off dead, or of hurting yourself in some way: 0 - not at all  PHQ-9 Score: 16  PHQ-9 Interpretation: Moderately severe depression       General Health  Sleep: sleeps poorly, gets 7-8 hours of sleep on average, snores loudly, unrefreshing sleep, witnessed gasping, and says that anxious thoughts occasionally prevent her from falling asleep  . Sleep study was denied by insurance.   Hearing: normal - bilateral.  Vision: no vision problems and most recent eye exam >1 year ago.   Dental: regular dental visits, brushes teeth twice daily, and flosses teeth occasionally.       /GYN Health  Follows with gynecology? yes   Last menstrual period: today  Contraceptive method:  no .  History of STDs?: yes.     Advanced Care Planning  Do you have an advanced directive? no  Do you have a durable medical power of ? no  ACP document given to the patient? no      Review of Systems:     Review of Systems   Constitutional:  Negative for fever.   HENT:  Negative for congestion.    Respiratory:  Negative for cough, shortness of breath and wheezing.    Cardiovascular:  Negative for chest pain and leg swelling.   Gastrointestinal:  Negative for abdominal distention, constipation, diarrhea, nausea and vomiting.   Genitourinary:  Negative for difficulty urinating and dysuria.   Neurological:  Positive for headaches.   Psychiatric/Behavioral:  Positive for sleep disturbance.    Additional ROS as noted in HPI   Past Medical History:     Past Medical History:   Diagnosis Date    Anxiety     Depression     Obsessive-compulsive disorder     Sleep difficulties       Past Surgical History:     Past Surgical History:   Procedure Laterality Date    DENTAL SURGERY        Social History:     Social History     Socioeconomic History    Marital status: Single     Spouse name: None    Number of children: None     Years of education: None    Highest education level: None   Occupational History    Occupation: Healthcare Coordinator   Tobacco Use    Smoking status: Former     Current packs/day: 0.00     Types: Cigarettes     Quit date: 2022     Years since quittin.2    Smokeless tobacco: Never   Vaping Use    Vaping status: Never Used   Substance and Sexual Activity    Alcohol use: Yes     Comment: socially    Drug use: Not Currently    Sexual activity: Yes     Partners: Female     Birth control/protection: None   Other Topics Concern    None   Social History Narrative    None     Social Determinants of Health     Financial Resource Strain: Low Risk  (2022)    Overall Financial Resource Strain (CARDIA)     Difficulty of Paying Living Expenses: Not hard at all   Food Insecurity: No Food Insecurity (2022)    Hunger Vital Sign     Worried About Running Out of Food in the Last Year: Never true     Ran Out of Food in the Last Year: Never true   Transportation Needs: No Transportation Needs (2022)    PRAPARE - Transportation     Lack of Transportation (Medical): No     Lack of Transportation (Non-Medical): No   Physical Activity: Not on file   Stress: No Stress Concern Present (2022)    [employer] [place] [employer] - Occupational Stress Questionnaire     Feeling of Stress : Not at all   Social Connections: Moderately Isolated (10/4/2022)    Received from [facility]    Social Connection and Isolation Panel [NHANES]     Frequency of Communication with Friends and Family: Three times a week     Frequency of Social Gatherings with Friends and Family: More than three times a week     Attends Congregational Services: Never     Active Member of Clubs or Organizations: No     Attends Club or Organization Meetings: Never     Marital Status:    Intimate Partner Violence: Not At Risk (2022)    Humiliation, Afraid, Rape, and Kick questionnaire     Fear of Current or Ex-Partner:  "No     Emotionally Abused: No     Physically Abused: No     Sexually Abused: No   Housing Stability: Low Risk  (12/5/2022)    Housing Stability Vital Sign     Unable to Pay for Housing in the Last Year: No     Number of Places Lived in the Last Year: 2     Unstable Housing in the Last Year: No      Family History:     Family History   Problem Relation Age of Onset    Diabetes Father     No Known Problems Brother     Breast cancer Neg Hx     Colon cancer Neg Hx     Ovarian cancer Neg Hx       Current Medications:     Current Outpatient Medications   Medication Sig Dispense Refill    amitriptyline (ELAVIL) 10 mg tablet Take 2 tablets (20 mg total) by mouth daily at bedtime 60 tablet 0    ergocalciferol (VITAMIN D2) 50,000 units Take 1 capsule (50,000 Units total) by mouth once a week 12 capsule 0    fluvoxaMINE (LUVOX) 50 mg tablet Take 1 tablet (50 mg total) by mouth 2 (two) times a day 60 tablet 3    propranolol (INDERAL) 40 mg tablet Take 1 tablet (40 mg total) by mouth 2 (two) times a day 60 tablet 0    Sodium Fluoride 5000 PPM 1.1 % PSTE BRUSH THOROUGHLY ONCE DAILY PREFERABLY AT BEDTIME FOR 2 MINUTES, EXPECTORATE AFTER USE, DO NOT RINSE.      nystatin (MYCOSTATIN) powder Apply topically 3 (three) times a day for 14 days 60 g 0    vitamin B-12 (VITAMIN B-12) 1,000 mcg tablet Take 2 tablets (2,000 mcg total) by mouth daily 60 tablet 2     No current facility-administered medications for this visit.      Allergies:     No Known Allergies   Physical Exam:     /72 (BP Location: Right arm, Patient Position: Sitting, Cuff Size: Large)   Pulse 86   Temp (!) 97.4 °F (36.3 °C) (Tympanic)   Ht 5' 3\" (1.6 m)   Wt 111 kg (244 lb 9.6 oz)   SpO2 98%   BMI 43.33 kg/m²     Physical Exam  Constitutional:       General: She is not in acute distress.     Appearance: She is not ill-appearing or diaphoretic.   HENT:      Head: Normocephalic and atraumatic.      Nose: No congestion or rhinorrhea.      Mouth/Throat:      " Mouth: Mucous membranes are moist.      Pharynx: Oropharynx is clear. No oropharyngeal exudate or posterior oropharyngeal erythema.   Eyes:      General:         Right eye: No discharge.         Left eye: No discharge.      Conjunctiva/sclera: Conjunctivae normal.   Cardiovascular:      Rate and Rhythm: Normal rate and regular rhythm.      Pulses: Normal pulses.      Heart sounds: Normal heart sounds. No murmur heard.  Pulmonary:      Effort: Pulmonary effort is normal. No respiratory distress.      Breath sounds: Normal breath sounds. No wheezing or rales.   Abdominal:      General: There is no distension.      Tenderness: There is no abdominal tenderness. There is no guarding or rebound.   Musculoskeletal:      Right lower leg: No edema.      Left lower leg: No edema.   Skin:     General: Skin is warm and dry.      Capillary Refill: Capillary refill takes less than 2 seconds.      Findings: No rash.      Comments: Dry/flaking, non-erythematous skin noted around mouth   Neurological:      Mental Status: She is alert and oriented to person, place, and time.      Sensory: No sensory deficit.      Motor: No weakness.   Psychiatric:         Mood and Affect: Mood normal.         Behavior: Behavior normal.          Teresa Montana, DO   [facility]

## 2024-05-14 ENCOUNTER — TELEPHONE (OUTPATIENT)
Age: 24
End: 2024-05-14

## 2024-05-14 NOTE — TELEPHONE ENCOUNTER
PA for vitamin B-12     Submitted via    []CMM-KEY    [x]Surescripts-Case ID # [id number]   []Faxed to plan   []Other website    []Phone call Case ID #      Office notes sent, clinical questions answered. Awaiting determination    Turnaround time for your insurance to make a decision on your Prior Authorization can take 7-21 business days.

## 2024-05-15 DIAGNOSIS — E78.1 HYPERTRIGLYCERIDEMIA: ICD-10-CM

## 2024-05-15 DIAGNOSIS — E53.8 VITAMIN B12 DEFICIENCY: Primary | ICD-10-CM

## 2024-05-15 RX ORDER — LANOLIN ALCOHOL/MO/W.PET/CERES
2000 CREAM (GRAM) TOPICAL DAILY
Qty: 60 TABLET | Refills: 2 | Status: SHIPPED | OUTPATIENT
Start: 2024-05-15

## 2024-05-16 ENCOUNTER — CLINICAL SUPPORT (OUTPATIENT)
Dept: NUTRITION | Facility: HOSPITAL | Age: 24
End: 2024-05-16
Payer: COMMERCIAL

## 2024-05-16 VITALS — WEIGHT: 246.4 LBS | BODY MASS INDEX: 43.65 KG/M2

## 2024-05-16 DIAGNOSIS — E78.1 HYPERTRIGLYCERIDEMIA: ICD-10-CM

## 2024-05-16 DIAGNOSIS — G43.909 MIGRAINES: ICD-10-CM

## 2024-05-16 PROCEDURE — 97802 MEDICAL NUTRITION INDIV IN: CPT | Performed by: DIETITIAN, REGISTERED

## 2024-05-16 NOTE — PROGRESS NOTES
" Nutrition Assessment Form    Patient Name: Ruthy Farley    YOB: 2000    Sex: Female     Assessment Date: 5/16/2024  Start Time: 915 Stop Time: 1015 Total Minutes: 60     Data:  Present at session: self   Parent/Patient Concerns/reason for visit: \"I went to the doctor the other week I am low in B12 and Vit D and too much of the TG and not enough of the HDL.   Medical Dx/Reason for Referral: HTG   Past Medical History:   Diagnosis Date    Anxiety     Depression     Obsessive-compulsive disorder     Sleep difficulties        Current Outpatient Medications   Medication Sig Dispense Refill    amitriptyline (ELAVIL) 10 mg tablet Take 2 tablets (20 mg total) by mouth daily at bedtime 60 tablet 0    ergocalciferol (VITAMIN D2) 50,000 units Take 1 capsule (50,000 Units total) by mouth once a week 12 capsule 0    fluvoxaMINE (LUVOX) 50 mg tablet Take 1 tablet (50 mg total) by mouth 2 (two) times a day 60 tablet 3    nystatin (MYCOSTATIN) powder Apply topically 3 (three) times a day for 14 days 60 g 0    propranolol (INDERAL) 40 mg tablet Take 1 tablet (40 mg total) by mouth 2 (two) times a day 60 tablet 0    Sodium Fluoride 5000 PPM 1.1 % PSTE BRUSH THOROUGHLY ONCE DAILY PREFERABLY AT BEDTIME FOR 2 MINUTES, EXPECTORATE AFTER USE, DO NOT RINSE.      vitamin B-12 (VITAMIN B-12) 1,000 mcg tablet Take 2 tablets (2,000 mcg total) by mouth daily 60 tablet 2     No current facility-administered medications for this visit.        Additional Meds/Supplements: N/a   Special Learning Needs/barriers to learning/any new barriers N/a   Height: HC Readings from Last 5 Encounters:   No data found for HC      Weight: Wt Readings from Last 10 Encounters:   05/13/24 111 kg (244 lb 9.6 oz)   02/12/24 107 kg (236 lb 9.6 oz)   10/31/23 104 kg (228 lb 9.6 oz)   10/18/23 105 kg (230 lb 9.6 oz)   02/17/23 98.9 kg (218 lb)   12/05/22 99.3 kg (219 lb)     Estimated body mass index is 43.33 kg/m² as calculated from the " "following:    Height as of 24: 5' 3\" (1.6 m).    Weight as of 24: 111 kg (244 lb 9.6 oz).   Recent Weight Change: []Yes     [x]No  Amount: 16# gain x 6 months      Energy Needs: 1800cals (25 cals.kg - 1000 for 2#/wk wt loss)   No Known Allergies or intolerances NKFA   Social History     Substance and Sexual Activity   Alcohol Use Yes    Comment: socially    Will drink once every 2 wks and will have 3-4 drinks   Social History     Tobacco Use   Smoking Status Former    Current packs/day: 0.00    Types: Cigarettes    Quit date: 2022    Years since quittin.2   Smokeless Tobacco Never    N/a   Who shops? patient   Who cooks/cooking methods/Eating out/take out habits   patient  Cooking methods: bake/sandoval/air sandoval/grill/boil/other________    May eat out every other week- fast food    Exercise: Walking- 2-3x/wk [id number]-      Other: ie: Sleep habits/ stress level/ work habits household-lives with ?/ food security Bed at 1-2am- asleep by 2-3 and waking up at 8-10 am - still feels tired- does not take naps   Energy low to normal and does fluctuate during the day  Stress level 8/10  Lives with wife  Not working   Prior Nutritional Counseling? []Yes     [x]No  When:      Why:         Diet Hx:  10oz decaf black tea between b and lunch  (maybe 40 oz tea daily)  water (x12oz)  Breakfast:  hot decaf tea x 10oz and 6 oz water Diet: 9  yogurt (noosa-greek) and granola or breakfast s/w- bagel with avocado and cream cheese and eggs  Now will be more lunch food oriented with waking up later- leftovers   Lunch: 1pm- chicken breast x 6oz- pasta-x2c shredded cheese and olive oil  Vegetables with meals- cucumbers or avocado  [place]- non sweetened drinks in general   Dinner: 7pm- meat starch vegetables-  struggles with meal prep r/t executive function d/o  Mozzarella and avocado   Snacks:  some kind of junk food daily x 2 c ice cream AM - less artificial sweeteners -more nuts berries/fruits  PM - trail mixes  HS -  frozen " oscar   Other Notes/ Initial Assessment:  Ruthy is here with her wife, has a hx of eating d/o grew up in [place] (restricting and purging) and would like to learn to eat better and improve her blood work.   She would like to stop gaining weight as well.  She is finding the conversation today triggering.  Has executive function disorder and struggles to stay on schedule.    Discussed importance of eating regular meals and snacks, portions sizes/ appropriate, adequate fluids, sleep and activity.  Provided High Triglyceride Nutrition Therapy and RD name and number for home use, no follow up desired at this time.  All questions answered.    Updated assessment (Follow up note only):     Nutrition Diagnosis:   Altered Nutrition-Related Laboratory values  related to Kidney, liver, cardiac, endocrine, neurologic, and/or pulmonary dysfunction as evidenced by  Abnormal serum lipids       Any change or new dx since previous visit:     Nutrition Diagnosis:   Overweight/obesity  related to Excess energy intake as evidenced by  BMI more than normative standard for age and sex (obesity-grade III 40+)      Medical Nutrition Therapy Intervention:  [x]Individualized Meal Plan-Fluid intake discussed and appropriate amounts and choices, 16 oz with meals and additional 32oz during the day.  S/S dehydration also covered.  Discussed the importance of eating 3 meals daily and not skipping, and how metabolism is affected.  Also discussed adding in small snacks or 5-6 small meals daily if desired vs 3 larger ones, and appropriate options and portions.  Appropriate timing of meals, including eating within 1 hr of waking and eating meals slowly 20mins/meals, minimizing mindless/boredom or habitual eating, etc was also mentioned. []Understanding Lab Values   []Basic Pathophysiology of Disease []Food/Medication Interactions   []Food Diary [x]Exercise-150 mins of moderate activity weekly, discussed importance of variety of activity,  "including wt bearing activities 2-3x/wk x 10-15mins. Discussed importance of activity throughout the lifecycle and its impact on overall health/stress management, etc.   [x]Lifestyle/Behavior Modification Techniques-Discussed the importance of adequate sleep, and ideal sleeping conditions, including a cool temperature 64-68 degrees F, and a dark and quiet room. Also discussed the importance of a regular and consistent sleep routine, including minimizing blue light exposure an hour before sleeping.  Weekend habits should include staying fairly consistent with weekday sleep habits to minimize disruption during the week.  A connection was made between getting adequate and good quality sleep and the ability to handle stress the next day, make healthy food choices, and be active. []Medication, Mechanism of Action   []Label Reading: CHO/ Na/ Fat/ other_________ []Self Blood Glucose Monitoring   []Weight/BMI Goals: gain/lose/maintain []Other -           Comprehension: []Excellent  []Very Good  [x]Good  []Fair   []Poor    Receptivity: []Excellent  []Very Good  []Good  [x]Fair   []Poor    Expected Compliance: []Excellent  []Very Good  []Good  [x]Fair   []Poor        Goals (initial)/ Progress made on previous goals/new goals:   80oz fluids daily   2.  3 meals and 3 healthy snacks daily   3.       No follow-ups on file.  Labs:  CMP  Lab Results   Component Value Date    K 4.4 04/22/2024     04/22/2024    CO2 28 04/22/2024    BUN 12 04/22/2024    CREATININE 0.75 04/22/2024    GLUF 84 04/22/2024    CALCIUM 9.0 04/22/2024    AST 20 04/22/2024    ALT 19 04/22/2024    ALKPHOS 98 04/22/2024    EGFR 112 04/22/2024       BMP  Lab Results   Component Value Date    CALCIUM 9.0 04/22/2024    K 4.4 04/22/2024    CO2 28 04/22/2024     04/22/2024    BUN 12 04/22/2024    CREATININE 0.75 04/22/2024       Lipids  No results found for: \"CHOL\"  Lab Results   Component Value Date    HDL 41 (L) 04/22/2024    HDL 43 (L) 10/21/2023 " "    Lab Results   Component Value Date    LDLCALC 99 04/22/2024    LDLCALC 102 (H) 10/21/2023     Lab Results   Component Value Date    TRIG 231 (H) 04/22/2024    TRIG 112 10/21/2023     No results found for: \"CHOLHDL\"    Hemoglobin A1C  Lab Results   Component Value Date    HGBA1C 5.3 05/11/2023       Fasting Glucose  Lab Results   Component Value Date    GLUF 84 04/22/2024       Insulin     Thyroid  Lab Results   Component Value Date    TSH 1.01 10/15/2022       Hepatic Function Panel  Lab Results   Component Value Date    ALT 19 04/22/2024    AST 20 04/22/2024    ALKPHOS 98 04/22/2024       Celiac Disease Antibody Panel  No results found for: \"ENDOMYSIAL IGA\", \"GLIADIN IGA\", \"GLIADIN IGG\", \"IGA\", \"TISSUE TRANSGLUT AB\", \"TTG IGA\"   Iron  Lab Results   Component Value Date    IRON 71 04/22/2024    TIBC 455 (H) 04/22/2024    FERRITIN 11 04/22/2024            Belen Ordaz RD  [facility]IN[facility]UTR[facility]  [address] [place] [address]    "

## 2024-05-17 DIAGNOSIS — Z91.89 AT RISK FOR SLEEP APNEA: ICD-10-CM

## 2024-05-17 DIAGNOSIS — G89.29 CHRONIC NONINTRACTABLE HEADACHE, UNSPECIFIED HEADACHE TYPE: Primary | ICD-10-CM

## 2024-05-17 DIAGNOSIS — R51.9 CHRONIC NONINTRACTABLE HEADACHE, UNSPECIFIED HEADACHE TYPE: Primary | ICD-10-CM

## 2024-05-17 RX ORDER — AMITRIPTYLINE HYDROCHLORIDE 10 MG/1
TABLET, FILM COATED ORAL
Qty: 60 TABLET | Refills: 5 | Status: SHIPPED | OUTPATIENT
Start: 2024-05-17

## 2024-05-22 ENCOUNTER — TELEMEDICINE (OUTPATIENT)
Dept: PSYCHIATRY | Facility: CLINIC | Age: 24
End: 2024-05-22
Payer: COMMERCIAL

## 2024-05-22 DIAGNOSIS — F41.1 GENERALIZED ANXIETY DISORDER: ICD-10-CM

## 2024-05-22 DIAGNOSIS — F42.2 MIXED OBSESSIONAL THOUGHTS AND ACTS: ICD-10-CM

## 2024-05-22 DIAGNOSIS — F33.2 SEVERE EPISODE OF RECURRENT MAJOR DEPRESSIVE DISORDER, WITHOUT PSYCHOTIC FEATURES (HCC): ICD-10-CM

## 2024-05-22 DIAGNOSIS — F41.1 GAD (GENERALIZED ANXIETY DISORDER): Primary | ICD-10-CM

## 2024-05-22 PROCEDURE — 90833 PSYTX W PT W E/M 30 MIN: CPT | Performed by: NURSE PRACTITIONER

## 2024-05-22 PROCEDURE — 99214 OFFICE O/P EST MOD 30 MIN: CPT | Performed by: NURSE PRACTITIONER

## 2024-05-22 RX ORDER — ESCITALOPRAM OXALATE 10 MG/1
10 TABLET ORAL DAILY
Qty: 30 TABLET | Refills: 3 | Status: SHIPPED | OUTPATIENT
Start: 2024-05-22

## 2024-05-22 NOTE — PSYCH
Virtual Regular Visit    Problem List Items Addressed This Visit          Behavioral Health    Current severe episode of major depressive disorder without psychotic features (HCC)    Relevant Medications    escitalopram (Lexapro) 10 mg tablet    Generalized anxiety disorder    Relevant Medications    escitalopram (Lexapro) 10 mg tablet    Mixed obsessional thoughts and acts     Other Visit Diagnoses       JAEL (generalized anxiety disorder)    -  Primary    Relevant Medications    escitalopram (Lexapro) 10 mg tablet          Reason for visit is   Chief Complaint   Patient presents with    OCD    Anxiety    Depression    Follow-up     Encounter provider Marcia Gross, PhD    Provider located at [address]  [address]  [facility] [address] [phone]    Recent Visits  No visits were found meeting these conditions.  Showing recent visits within past 7 days and meeting all other requirements  Today's Visits  Date Type Provider Dept   05/22/24 Telemedicine Marcia Gross, PhD  Psychiatric [facility]   Showing today's visits and meeting all other requirements  Future Appointments  No visits were found meeting these conditions.  Showing future appointments within next 150 days and meeting all other requirements       After connecting through [employer], the patient was identified by name and date of birth. Ruthy Farley was informed that this is a telemedicine visit and that the visit is being conducted through the Epic Embedded platform. She agrees to proceed. which may not be secure and therefore, might not be HIPAA-compliant.  My office door was closed. No one else was in the room.  She acknowledged consent and understanding of privacy and security of the video platform. The patient has agreed to participate and understands they can discontinue the visit at any time.    SUBJECTIVE:    Ruthy Farley is a 24 y.o.  female with a history of OCD, JAEL and depression seen for medication management and mood assessment.  Ruthy reports she received the medication from PCP for her migraines, Luvox is not helping with OCD anxiety and depression.  She denies suicidal ideation.  When asked to rank anxiety and OCD symptoms anxiety symptoms are worse discussed options for her treatment and Lexapro was ordered at 10 mg daily, side effects discussed and she will call next week with her response or earlier if there is any problem.  She reports her appetite is fair she is sleeping takes medication as prescribed and family are supportive.    HPI ROS Appetite Changes and Sleep: normal appetite and decreased energy    Review Of Systems:     Mood Anxiety, Depression, and Emotional Lability   Behavior Compulsive Behavior   Thought Content Normal   General Emotional Problems   Personality Normal   Other Psych Symptoms Normal   Constitutional As Noted in HPI   ENT As Noted in HPI   Cardiovascular As Noted in HPI   Respiratory As Noted in HPI   Gastrointestinal As Noted in HPI   Genitourinary As Noted in HPI   Musculoskeletal As Noted in HPI   Integumentary As Noted in HPI   Neurological As Noted in HPI   Endocrine Normal    Other Symptoms Normal        Substance Abuse History:    Social History     Substance and Sexual Activity   Drug Use Not Currently       Family Psychiatric History:     Family History   Problem Relation Age of Onset    Diabetes Father     No Known Problems Brother     Breast cancer Neg Hx     Colon cancer Neg Hx     Ovarian cancer Neg Hx        Social History     Socioeconomic History    Marital status: Single     Spouse name: Not on file    Number of children: Not on file    Years of education: Not on file    Highest education level: Not on file   Occupational History    Occupation: Healthcare Coordinator   Tobacco Use    Smoking status: Former     Current packs/day: 0.00     Types: Cigarettes     Quit date: 02/2022      Years since quittin.3    Smokeless tobacco: Never   Vaping Use    Vaping status: Never Used   Substance and Sexual Activity    Alcohol use: Yes     Comment: socially    Drug use: Not Currently    Sexual activity: Yes     Partners: Female     Birth control/protection: None   Other Topics Concern    Not on file   Social History Narrative    Not on file     Social Determinants of Health     Financial Resource Strain: Low Risk  (2022)    Overall Financial Resource Strain (CARDIA)     Difficulty of Paying Living Expenses: Not hard at all   Food Insecurity: No Food Insecurity (2022)    Hunger Vital Sign     Worried About Running Out of Food in the Last Year: Never true     Ran Out of Food in the Last Year: Never true   Transportation Needs: No Transportation Needs (2022)    PRAPARE - Transportation     Lack of Transportation (Medical): No     Lack of Transportation (Non-Medical): No   Physical Activity: Not on file   Stress: No Stress Concern Present (2022)    [employer] [place] [employer] - Occupational Stress Questionnaire     Feeling of Stress : Not at all   Social Connections: Moderately Isolated (10/4/2022)    Received from [facility]    Social Connection and Isolation Panel [NHANES]     Frequency of Communication with Friends and Family: Three times a week     Frequency of Social Gatherings with Friends and Family: More than three times a week     Attends [employer] Services: Never     Active Member of Clubs or Organizations: No     Attends Club or Organization Meetings: Never     Marital Status:    Intimate Partner Violence: Not At Risk (2022)    Humiliation, Afraid, Rape, and Kick questionnaire     Fear of Current or Ex-Partner: No     Emotionally Abused: No     Physically Abused: No     Sexually Abused: No   Housing Stability: Low Risk  (2022)    Housing Stability Vital Sign     Unable to Pay for Housing in the Last Year: No     Number of Places  Lived in the Last Year: 2     Unstable Housing in the Last Year: No       Past Medical History:   Diagnosis Date    Anxiety     Depression     Obsessive-compulsive disorder     Sleep difficulties        Past Surgical History:   Procedure Laterality Date    DENTAL SURGERY         Current Outpatient Medications   Medication Sig Dispense Refill    escitalopram (Lexapro) 10 mg tablet Take 1 tablet (10 mg total) by mouth daily 30 tablet 3    amitriptyline (ELAVIL) 10 mg tablet TAKE TWO TABLETS BY MOUTH EVERY EVENING AT BEDTIME 60 tablet 5    ergocalciferol (VITAMIN D2) 50,000 units Take 1 capsule (50,000 Units total) by mouth once a week 12 capsule 0    propranolol (INDERAL) 40 mg tablet Take 1 tablet (40 mg total) by mouth 2 (two) times a day 60 tablet 0    Sodium Fluoride 5000 PPM 1.1 % PSTE BRUSH THOROUGHLY ONCE DAILY PREFERABLY AT BEDTIME FOR 2 MINUTES, EXPECTORATE AFTER USE, DO NOT RINSE.      vitamin B-12 (VITAMIN B-12) 1,000 mcg tablet Take 2 tablets (2,000 mcg total) by mouth daily 60 tablet 2     No current facility-administered medications for this visit.        No Known Allergies    The following portions of the patient's history were reviewed and updated as appropriate: allergies, current medications, past family history, past medical history, past social history, past surgical history, and problem list.    OBJECTIVE:     Mental Status Examination:    Appearance calm and cooperative , adequate hygiene and grooming, and good eye contact    Mood depressed and anxious   Affect affect appropriate    Speech a normal rate   Thought Processes normal thought processes   Hallucinations no hallucinations present    Thought Content no delusions   Abnormal Thoughts no suicidal thoughts    Orientation  oriented to time   Remote Memory short term memory intact and long term memory intact   Attention Span concentration intact   Intellect Appears to be of Average Intelligence   Insight Insight intact   Judgement judgment was  "intact   Muscle Strength Muscle strength and tone were normal   Language no difficulty naming common objects   Fund of Knowledge displays adequate knowledge of current events   Pain none but migraines   Pain Scale 5       Laboratory Results: No results found for this or any previous visit.    Assessment/Plan:       Diagnoses and all orders for this visit:    JAEL (generalized anxiety disorder)  -     escitalopram (Lexapro) 10 mg tablet; Take 1 tablet (10 mg total) by mouth daily    Severe episode of recurrent major depressive disorder, without psychotic features (HCC)    Generalized anxiety disorder    Mixed obsessional thoughts and acts          Treatment Recommendations- Risks Benefits      Immediate Medical/Psychiatric/Psychotherapy Treatments and Any Precautions: Continue treatment plan stop Luvox and start Lexapro 10 mg    Risks, Benefits And Possible Side Effects Of Medications:  {PSYCH RISK, BENEFITS AND POSSIBLE SIDE EFFECTS (Optional):35540       Psychotherapy Provided: 30 minutes  Supportive therapy  Medication evaluation  Mood assessment  Medication education  Normalized and validated her feelings      Goals discussed in session: Reduce anxiety and OCD and depression       Treatment Plan:    Completed and signed during the session: Not applicable - Treatment Plan not due at this session  \"Portions of the record may have been created with voice recognition software. Occasional wrong word or \"sound a like\" substitutions may have occurred due to the inherent limitations of voice recognition software. Read the chart carefully and recognize, using context, where substitutions have occurred. Please call if you have any questions. \"      Marcia Gross, PhD 05/22/24  "

## 2024-05-30 ENCOUNTER — TELEPHONE (OUTPATIENT)
Dept: FAMILY MEDICINE CLINIC | Facility: CLINIC | Age: 24
End: 2024-05-30

## 2024-05-30 NOTE — TELEPHONE ENCOUNTER
Encounter for forms      Patient requires a form to be completed.  Patient is aware of 7-10 day turn around time.    Please refer to the following information:       Type of Form: [employer]     Date of Visit (if applicable):     Doctor: Dr [place]    Expected date: timely manner    How patient would like to receive form:please review and if needs to filled out please then fax       Fax number: [phone]      Patient phone number:       Copy scanned to encounter. Copy provided to patient. Original in (X) team folder to be completed.

## 2024-06-06 DIAGNOSIS — G43.709 CHRONIC MIGRAINE WITHOUT AURA WITHOUT STATUS MIGRAINOSUS, NOT INTRACTABLE: ICD-10-CM

## 2024-06-07 RX ORDER — PROPRANOLOL HYDROCHLORIDE 40 MG/1
40 TABLET ORAL 2 TIMES DAILY
Qty: 60 TABLET | Refills: 5 | Status: SHIPPED | OUTPATIENT
Start: 2024-06-07

## 2024-06-21 ENCOUNTER — TELEPHONE (OUTPATIENT)
Dept: FAMILY MEDICINE CLINIC | Facility: CLINIC | Age: 24
End: 2024-06-21

## 2024-06-21 NOTE — TELEPHONE ENCOUNTER
Appt with dr valentin was canceled due to the attending not being the patients PCP. Lvm to inform patient about her upcoming appt with her pcp dr sears

## 2024-06-26 ENCOUNTER — OFFICE VISIT (OUTPATIENT)
Dept: FAMILY MEDICINE CLINIC | Facility: CLINIC | Age: 24
End: 2024-06-26
Payer: COMMERCIAL

## 2024-06-26 VITALS
HEART RATE: 92 BPM | DIASTOLIC BLOOD PRESSURE: 76 MMHG | TEMPERATURE: 98.9 F | HEIGHT: 63 IN | OXYGEN SATURATION: 98 % | BODY MASS INDEX: 42.74 KG/M2 | WEIGHT: 241.2 LBS | SYSTOLIC BLOOD PRESSURE: 120 MMHG

## 2024-06-26 DIAGNOSIS — L60.0 INGROWN NAIL OF GREAT TOE: ICD-10-CM

## 2024-06-26 DIAGNOSIS — E53.8 VITAMIN B12 DEFICIENCY: ICD-10-CM

## 2024-06-26 DIAGNOSIS — G43.709 CHRONIC MIGRAINE WITHOUT AURA WITHOUT STATUS MIGRAINOSUS, NOT INTRACTABLE: Primary | ICD-10-CM

## 2024-06-26 PROCEDURE — 99213 OFFICE O/P EST LOW 20 MIN: CPT

## 2024-06-26 RX ORDER — AMITRIPTYLINE HYDROCHLORIDE 10 MG/1
20 TABLET, FILM COATED ORAL
Qty: 60 TABLET | Refills: 5 | Status: SHIPPED | OUTPATIENT
Start: 2024-06-26

## 2024-06-26 RX ORDER — LANOLIN ALCOHOL/MO/W.PET/CERES
1000 CREAM (GRAM) TOPICAL DAILY
Qty: 30 TABLET | Refills: 3 | Status: SHIPPED | OUTPATIENT
Start: 2024-06-26

## 2024-06-26 NOTE — ASSESSMENT & PLAN NOTE
Chronic migraines    Plan:  Continue amitriptyline 20 mg daily  Continue propranolol 40 mg BID  Continue Excedrin on as-needed basis as abortive therapy  F/U with [facility] for migraine, possible migraine study candidate

## 2024-06-26 NOTE — PATIENT INSTRUCTIONS
Please follow up with podiatry for ingrown toenail  Please follow up with neurology for their migraine clinic  Restart amitriptyline 20 mg daily  You may schedule with [facility] for migraines at your earliest convenience.   Please complete lab work and continue B12 supplementation with 1000 mcg daily

## 2024-06-26 NOTE — ASSESSMENT & PLAN NOTE
Lab Results   Component Value Date    [id number] 107 (L) 04/22/2024     Completed supplementation with 2000 mcg vitamin b12 supplementation    Plan:  Repeat CBC and B12 level, orders already provided  Continue daily supplementation with 1000 mcg of B12

## 2024-06-26 NOTE — PROGRESS NOTES
Ambulatory Visit  Name: Ruthy Farley      : 2000      MRN: [id number]  Encounter Provider: Teresa Montana DO  Encounter Date: 2024   Encounter department: [facility] [place]    Assessment & Plan   1. Chronic migraine without aura without status migrainosus, not intractable  Assessment & Plan:  Chronic migraines    Plan:  Continue amitriptyline 20 mg daily  Continue propranolol 40 mg BID  Continue Excedrin on as-needed basis as abortive therapy  Migraine journaling    Orders:  -     amitriptyline (ELAVIL) 10 mg tablet; Take 2 tablets (20 mg total) by mouth daily at bedtime  -     Ambulatory Referral to Neurology; Future  2. Vitamin B12 deficiency  Assessment & Plan:  Lab Results   Component Value Date    [id number] 107 (L) 2024     Completed supplementation with 2000 mcg vitamin b12 supplementation    Plan:  Repeat CBC and B12 level, orders already provided  Continue daily supplementation with 1000 mcg of B12    Orders:  -     vitamin B-12 (VITAMIN B-12) 1,000 mcg tablet; Take 1 tablet (1,000 mcg total) by mouth daily  3. Ingrown nail of great toe  Assessment & Plan:  Ingrown toenail of left great toe  - No drainage or sign of infection    Plan:  Referral to podiatry provided  Orders:  -     Ambulatory Referral to Podiatry; Future       History of Present Illness     HPI  25 yo patient presents for a follow up of chronic migraines and B12 deficiency with new concerns of an ingrown toe nail. Patient notes that she now gets headaches a few times a month, but had a recent issue with her amitriptyline. She transferred her prescription to a different pharmacy while she was traveling and received a dose of 100 mg tablets instead of the prescribed 10 mg tablets. She took the increased dose between the  and  before recognizing the difference in dosing and discontinuing the medication. She notes that during the time she was taking the incorrect dose she was  "extremely tired and experienced multiple episodes of watery diarrhea. Since stopping the amitriptyline, her symptoms have improved, but she still has 2-3 episodes of diarrhea daily,      Review of Systems   Constitutional:  Positive for fatigue. Negative for fever.   Respiratory:  Negative for cough, chest tightness and shortness of breath.    Cardiovascular:  Negative for chest pain and leg swelling.   Gastrointestinal:  Positive for diarrhea. Negative for abdominal distention, abdominal pain, blood in stool, constipation, nausea and vomiting.   Endocrine: Negative for polyuria.   Genitourinary:  Negative for difficulty urinating.   Neurological:  Positive for headaches. Negative for syncope.       Objective     /76 (BP Location: Left arm, Patient Position: Sitting, Cuff Size: Large)   Pulse 92   Temp 98.9 °F (37.2 °C) (Tympanic)   Ht 5' 3\" (1.6 m)   Wt 109 kg (241 lb 3.2 oz)   SpO2 98%   BMI 42.73 kg/m²     Physical Exam  Constitutional:       General: She is not in acute distress.     Appearance: She is not ill-appearing or diaphoretic.   HENT:      Head: Normocephalic and atraumatic.   Eyes:      General:         Right eye: No discharge.         Left eye: No discharge.      Conjunctiva/sclera: Conjunctivae normal.   Cardiovascular:      Rate and Rhythm: Normal rate and regular rhythm.      Pulses: Normal pulses.      Heart sounds: No murmur heard.  Pulmonary:      Effort: Pulmonary effort is normal.      Breath sounds: Normal breath sounds.   Abdominal:      General: Bowel sounds are normal. There is no distension.      Palpations: Abdomen is soft.      Tenderness: There is no abdominal tenderness. There is no guarding or rebound.   Musculoskeletal:      Right lower leg: No edema.      Left lower leg: No edema.   Skin:     General: Skin is warm and dry.   Neurological:      Mental Status: She is alert.       Administrative Statements   I have spent a total time of 30 minutes on 06/26/24 In caring for " this patient including Patient and family education, Reviewing / ordering tests, medicine, procedures  , Obtaining or reviewing history  , and Communicating with other healthcare professionals .

## 2024-06-26 NOTE — ASSESSMENT & PLAN NOTE
Ingrown toenail of left great toe  - No drainage or sign of infection    Plan:  Referral to podiatry provided

## 2024-06-27 ENCOUNTER — OFFICE VISIT (OUTPATIENT)
Dept: PODIATRY | Facility: CLINIC | Age: 24
End: 2024-06-27
Payer: COMMERCIAL

## 2024-06-27 VITALS — HEIGHT: 63 IN | RESPIRATION RATE: 18 BRPM | BODY MASS INDEX: 42.73 KG/M2

## 2024-06-27 DIAGNOSIS — M79.675 PAIN IN TOE OF LEFT FOOT: Primary | ICD-10-CM

## 2024-06-27 DIAGNOSIS — L60.0 INGROWN NAIL OF GREAT TOE: ICD-10-CM

## 2024-06-27 PROCEDURE — 99243 OFF/OP CNSLTJ NEW/EST LOW 30: CPT | Performed by: PODIATRIST

## 2024-06-27 NOTE — PROGRESS NOTES
"Ambulatory Visit  Name: Ruthy Farley      : 2000      MRN: [id number]  Encounter Provider: Dwayne Killian DPM  Encounter Date: 2024   Encounter department: [facility] [place]    Explained to patient that she no longer is dealing with an ingrown nail today.  She has no nail discomfort.  It is likely though that she will have difficulty in the future.  She desires to schedule partial avulsion procedure along the medial nail border of the left great toe in September.    Assessment & Plan   1. Pain in toe of left foot  2. Ingrown nail of great toe  -     Ambulatory Referral to Podiatry      History of Present Illness     Ruthy Farley is a 24 y.o. female who presents for assessment of her left great toe.  Approximately 1 week ago, while walking and extended distance, patient had pain along the medial nail border of the left great toe.  Today, there is minimal pain.  Patient relates a history of having difficulty with ingrown nails.  She had a partial avulsion procedure on the lateral nail border of the left great toe which was successful years back.    Review of Systems   Gastrointestinal: Negative.    Musculoskeletal: Negative.    Psychiatric/Behavioral:          History of depression           Objective     Resp 18   Ht 5' 3\" (1.6 m)   BMI 42.73 kg/m²     Physical Exam  Constitutional:       Appearance: She is obese.   Cardiovascular:      Pulses: Normal pulses.   Musculoskeletal:         General: Normal range of motion.   Skin:     Comments: No pain with palpation medial lateral nail border left hallux.  Slight callus buildup along the medial border.  No evidence of paronychia.   Neurological:      General: No focal deficit present.      Mental Status: She is oriented to person, place, and time.           Administrative Statements           "

## 2024-06-28 ENCOUNTER — APPOINTMENT (OUTPATIENT)
Dept: LAB | Facility: CLINIC | Age: 24
End: 2024-06-28
Payer: COMMERCIAL

## 2024-06-28 DIAGNOSIS — E53.8 VITAMIN B12 DEFICIENCY: ICD-10-CM

## 2024-06-28 LAB
BASOPHILS # BLD AUTO: 0.09 THOUSANDS/ÂΜL (ref 0–0.1)
BASOPHILS NFR BLD AUTO: 1 % (ref 0–1)
EOSINOPHIL # BLD AUTO: 0.18 THOUSAND/ÂΜL (ref 0–0.61)
EOSINOPHIL NFR BLD AUTO: 2 % (ref 0–6)
ERYTHROCYTE [DISTWIDTH] IN BLOOD BY AUTOMATED COUNT: 15.2 % (ref 11.6–15.1)
HCT VFR BLD AUTO: 41.3 % (ref 34.8–46.1)
HGB BLD-MCNC: 12.7 G/DL (ref 11.5–15.4)
IMM GRANULOCYTES # BLD AUTO: 0.05 THOUSAND/UL (ref 0–0.2)
IMM GRANULOCYTES NFR BLD AUTO: 0 % (ref 0–2)
LYMPHOCYTES # BLD AUTO: 2.75 THOUSANDS/ÂΜL (ref 0.6–4.47)
LYMPHOCYTES NFR BLD AUTO: 23 % (ref 14–44)
MCH RBC QN AUTO: 25.1 PG (ref 26.8–34.3)
MCHC RBC AUTO-ENTMCNC: 30.8 G/DL (ref 31.4–37.4)
MCV RBC AUTO: 82 FL (ref 82–98)
MONOCYTES # BLD AUTO: 0.81 THOUSAND/ÂΜL (ref 0.17–1.22)
MONOCYTES NFR BLD AUTO: 7 % (ref 4–12)
NEUTROPHILS # BLD AUTO: 7.86 THOUSANDS/ÂΜL (ref 1.85–7.62)
NEUTS SEG NFR BLD AUTO: 67 % (ref 43–75)
NRBC BLD AUTO-RTO: 0 /100 WBCS
PLATELET # BLD AUTO: 339 THOUSANDS/UL (ref 149–390)
PMV BLD AUTO: 9.5 FL (ref 8.9–12.7)
RBC # BLD AUTO: 5.05 MILLION/UL (ref 3.81–5.12)
VIT B12 SERPL-MCNC: 347 PG/ML (ref 180–914)
WBC # BLD AUTO: 11.74 THOUSAND/UL (ref 4.31–10.16)

## 2024-06-28 PROCEDURE — 85025 COMPLETE CBC W/AUTO DIFF WBC: CPT

## 2024-06-28 PROCEDURE — 82607 VITAMIN B-12: CPT

## 2024-06-28 PROCEDURE — 36415 COLL VENOUS BLD VENIPUNCTURE: CPT

## 2024-06-30 ENCOUNTER — HOSPITAL ENCOUNTER (OUTPATIENT)
Dept: SLEEP CENTER | Facility: CLINIC | Age: 24
Discharge: HOME | End: 2024-06-30
Payer: COMMERCIAL

## 2024-06-30 DIAGNOSIS — G89.29 CHRONIC NONINTRACTABLE HEADACHE, UNSPECIFIED HEADACHE TYPE: ICD-10-CM

## 2024-06-30 DIAGNOSIS — R51.9 CHRONIC NONINTRACTABLE HEADACHE, UNSPECIFIED HEADACHE TYPE: ICD-10-CM

## 2024-06-30 DIAGNOSIS — Z91.89 AT RISK FOR SLEEP APNEA: ICD-10-CM

## 2024-06-30 PROCEDURE — G0399 HOME SLEEP TEST/TYPE 3 PORTA: HCPCS

## 2024-07-01 ENCOUNTER — OFFICE VISIT (OUTPATIENT)
Dept: PSYCHIATRY | Facility: CLINIC | Age: 24
End: 2024-07-01
Payer: COMMERCIAL

## 2024-07-01 VITALS
WEIGHT: 248 LBS | HEIGHT: 63 IN | HEART RATE: 66 BPM | DIASTOLIC BLOOD PRESSURE: 65 MMHG | BODY MASS INDEX: 43.94 KG/M2 | SYSTOLIC BLOOD PRESSURE: 100 MMHG

## 2024-07-01 DIAGNOSIS — F33.2 SEVERE EPISODE OF RECURRENT MAJOR DEPRESSIVE DISORDER, WITHOUT PSYCHOTIC FEATURES (HCC): ICD-10-CM

## 2024-07-01 DIAGNOSIS — F41.1 GENERALIZED ANXIETY DISORDER: ICD-10-CM

## 2024-07-01 DIAGNOSIS — F42.2 MIXED OBSESSIONAL THOUGHTS AND ACTS: Primary | ICD-10-CM

## 2024-07-01 PROCEDURE — 99214 OFFICE O/P EST MOD 30 MIN: CPT | Performed by: NURSE PRACTITIONER

## 2024-07-01 PROCEDURE — 90833 PSYTX W PT W E/M 30 MIN: CPT | Performed by: NURSE PRACTITIONER

## 2024-07-01 RX ORDER — DULOXETIN HYDROCHLORIDE 20 MG/1
20 CAPSULE, DELAYED RELEASE ORAL 2 TIMES DAILY
Qty: 60 CAPSULE | Refills: 3 | Status: SHIPPED | OUTPATIENT
Start: 2024-07-01

## 2024-07-01 NOTE — PSYCH
"Visit Time    Visit Start Time: 9:00  Visit Stop Time: 9:30  Total Visit Duration:  30 minutes    Subjective:     Patient ID: Ruthy Farley is a 24 y.o. female.history OCD, anxiety and depression seen for medication management and mood assessment. Ruthy reports B12 is normal from supplements, taking Vitamin D. Lexapro is not effective, discussed options of SNRIs, Cymbalta started 20 mg BID. She agreed. Worried about her weight gain and eating \"issues\" has an appointment with nutritionist that deals with eating issues/disorders. She reports not sleeping well and Elavil does help. Plan to address sleep and possible change from Elavil next session. Names of Psychologist provided to test for ADHD.She was concerned about her lack of focus and concentration. Discussed possible reasons for her symptoms, either anxiety or ADHD. Coping discussed. Takes medication as prescribed. She is laid-off from work and will have the summer off. Denies depression. Admits to anxiety \" normal  elevation\". She had a sleep study and is waiting for results. Wife is supportive.     HPI ROS Appetite Changes and Sleep: increased appetite and normal energy level    Review Of Systems:     Mood Anxiety and Depression   Behavior Normal    Thought Content Disturbing Thoughts, Feelings   General Emotional Problems and Sleep Disturbances   Personality Normal   Other Psych Symptoms Normal   Constitutional As Noted in HPI   ENT As Noted in HPI   Cardiovascular As Noted in HPI   Respiratory As Noted in HPI   Gastrointestinal As Noted in HPI   Genitourinary As Noted in HPI   Musculoskeletal As Noted in HPI   Integumentary As Noted in HPI   Neurological As Noted in HPI   Endocrine Normal    Other Symptoms Normal        Laboratory Results:     Substance Abuse History:  Social History     Substance and Sexual Activity   Drug Use Not Currently       Family Psychiatric History:   Family History   Problem Relation Age of Onset    Diabetes Father     " No Known Problems Brother     Breast cancer Neg Hx     Colon cancer Neg Hx     Ovarian cancer Neg Hx        The following portions of the patient's history were reviewed and updated as appropriate: allergies, current medications, past family history, past medical history, past social history, past surgical history, and problem list.    Social History     Socioeconomic History    Marital status: Single     Spouse name: Not on file    Number of children: Not on file    Years of education: Not on file    Highest education level: Not on file   Occupational History    Occupation: Healthcare Coordinator   Tobacco Use    Smoking status: Former     Current packs/day: 0.00     Types: Cigarettes     Quit date: 2022     Years since quittin.4    Smokeless tobacco: Never   Vaping Use    Vaping status: Never Used   Substance and Sexual Activity    Alcohol use: Yes     Comment: socially    Drug use: Not Currently    Sexual activity: Yes     Partners: Female     Birth control/protection: None   Other Topics Concern    Not on file   Social History Narrative    Not on file     Social Determinants of Health     Financial Resource Strain: Low Risk  (2022)    Overall Financial Resource Strain (CARDIA)     Difficulty of Paying Living Expenses: Not hard at all   Food Insecurity: No Food Insecurity (2022)    Hunger Vital Sign     Worried About Running Out of Food in the Last Year: Never true     Ran Out of Food in the Last Year: Never true   Transportation Needs: No Transportation Needs (2022)    PRAPARE - Transportation     Lack of Transportation (Medical): No     Lack of Transportation (Non-Medical): No   Physical Activity: Not on file   Stress: No Stress Concern Present (2022)    [place] [employer] - Occupational Stress Questionnaire     Feeling of Stress : Not at all   Social Connections: Moderately Isolated (10/4/2022)    Received from [facility]    Social Connection and Isolation Panel [NHANES]     Frequency of Communication with Friends and Family: Three times a week     Frequency of Social Gatherings with Friends and Family: More than three times a week     Attends Faith Services: Never     Active Member of Clubs or Organizations: No     Attends Club or Organization Meetings: Never     Marital Status:    Intimate Partner Violence: Not At Risk (12/5/2022)    Humiliation, Afraid, Rape, and Kick questionnaire     Fear of Current or Ex-Partner: No     Emotionally Abused: No     Physically Abused: No     Sexually Abused: No   Housing Stability: Low Risk  (12/5/2022)    Housing Stability Vital Sign     Unable to Pay for Housing in the Last Year: No     Number of Places Lived in the Last Year: 2     Unstable Housing in the Last Year: No     Social History     Social History Narrative    Not on file       Objective:       Mental status:  Appearance adequate hygiene and grooming, restless and fidgety, and good eye contact    Mood anxious and mood appropriate   Affect affect appropriate    Speech a normal rate   Thought Processes normal thought processes   Hallucinations no hallucinations present    Thought Content no delusions   Abnormal Thoughts no suicidal thoughts  and no homicidal thoughts    Orientation  oriented to person and place and time   Remote Memory short term memory intact and long term memory intact   Attention Span concentration impaired per report, appears intact   Intellect Appears to be of Average Intelligence   Insight Insight intact   Judgement judgment was intact   Muscle Strength Muscle strength and tone were normal   Language no difficulty naming common objects   Fund of Knowledge displays adequate knowledge of current events   Pain none   Pain Scale 0       Assessment/Plan:       Diagnoses and all orders for this visit:    Mixed obsessional thoughts and acts  -     DULoxetine (CYMBALTA) 20 mg capsule; Take 1 capsule (20  "mg total) by mouth 2 (two) times a day    Generalized anxiety disorder  -     DULoxetine (CYMBALTA) 20 mg capsule; Take 1 capsule (20 mg total) by mouth 2 (two) times a day    Severe episode of recurrent major depressive disorder, without psychotic features (HCC)  -     DULoxetine (CYMBALTA) 20 mg capsule; Take 1 capsule (20 mg total) by mouth 2 (two) times a day            Treatment Recommendations- Risks Benefits      Immediate Medical/Psychiatric/Psychotherapy Treatments and Any Precautions: Continue treatment plan, Referred for testing ADHD, wean off lexapro start Cymbalta    Risks, Benefits And Possible Side Effects Of Medications:  {PSYCH RISK, BENEFITS AND POSSIBLE SIDE EFFECTS (Optional):71496       Psychotherapy Provided: 30 min Individual psychotherapy provided.   Supportive therapy  Medication evaluation  Mood assessment  Medication education  Normalized and validated her feelings      Goals discussed in session:stabilize mood    \"Portions of the record may have been created with voice recognition software. Occasional wrong word or \"sound a like\" substitutions may have occurred due to the inherent limitations of voice recognition software. Read the chart carefully and recognize, using context, where substitutions have occurred. Please call if you have any questions. \"                                  "

## 2024-07-01 NOTE — PROGRESS NOTES
Home Sleep Study Documentation    HOME STUDY DEVICE: Noxturnal no                                           Anahy G3 yes      Pre-Sleep Home Study:    Set-up and instructions performed by: Merary Whitehead RPS, RST CRT    Technician performed demonstration for Patient: yes    Return demonstration performed by Patient: yes    Written instructions provided to Patient: yes    Patient signed consent form: yes        Post-Sleep Home Study:    Additional comments by Patient: pending    Home Sleep Study Failed:pending    Failure reason: pending    Reported or Detected: pending    Scored by: pending

## 2024-07-08 ENCOUNTER — CLINICAL SUPPORT (OUTPATIENT)
Dept: NUTRITION | Facility: HOSPITAL | Age: 24
End: 2024-07-08
Payer: COMMERCIAL

## 2024-07-08 PROBLEM — G47.33 OSA (OBSTRUCTIVE SLEEP APNEA): Status: ACTIVE | Noted: 2024-07-08

## 2024-07-08 PROBLEM — R06.83 SNORING: Status: ACTIVE | Noted: 2024-07-08

## 2024-07-08 PROCEDURE — 97802 MEDICAL NUTRITION INDIV IN: CPT

## 2024-07-08 NOTE — PROGRESS NOTES
" Nutrition Assessment Form    Patient Name: Ruthy Farley    YOB: 2000    Sex: Female     Assessment Date: 7/8/2024  Start Time: 2:40 PM Stop Time: 3:50 PM Total Minutes: 70     Data:  Present at session: self   Parent/Patient Concerns/reason for visit:  \"My labs are off\"   Medical Dx/Reason for Referral:  E78.1 (ICD-10-CM) - Hypertriglyceridemia  Provider: Teresa Montana DO   Referred d/t hx of ED/DE   Past Medical History:   Diagnosis Date    Anxiety     Depression     Obsessive-compulsive disorder     Sleep difficulties        Current Outpatient Medications   Medication Sig Dispense Refill    amitriptyline (ELAVIL) 10 mg tablet Take 2 tablets (20 mg total) by mouth daily at bedtime 60 tablet 5    DULoxetine (CYMBALTA) 20 mg capsule Take 1 capsule (20 mg total) by mouth 2 (two) times a day 60 capsule 3    ergocalciferol (VITAMIN D2) 50,000 units Take 1 capsule (50,000 Units total) by mouth once a week 12 capsule 0    propranolol (INDERAL) 40 mg tablet TAKE ONE TABLET BY MOUTH TWICE A DAY 60 tablet 5    Sodium Fluoride 5000 PPM 1.1 % PSTE BRUSH THOROUGHLY ONCE DAILY PREFERABLY AT BEDTIME FOR 2 MINUTES, EXPECTORATE AFTER USE, DO NOT RINSE.      vitamin B-12 (VITAMIN B-12) 1,000 mcg tablet Take 1 tablet (1,000 mcg total) by mouth daily 30 tablet 3     No current facility-administered medications for this visit.        Additional Meds/Supplements: N/A   Special Learning Needs/barriers to learning/any new barriers N/A   Height: 5'3\" Ht Readings from Last 5 Encounters:   06/27/24 5' 3\" (1.6 m)   06/26/24 5' 3\" (1.6 m)   05/13/24 5' 3\" (1.6 m)   02/12/24 5' 3\" (1.6 m)   10/31/23 5' 3\" (1.6 m)      Weight: pt did not feel getting wt would be beneficial today Wt Readings from Last 10 Encounters:   06/26/24 109 kg (241 lb 3.2 oz)   05/16/24 112 kg (246 lb 6.4 oz)   05/13/24 111 kg (244 lb 9.6 oz)   02/12/24 107 kg (236 lb 9.6 oz)   10/31/23 104 kg (228 lb 9.6 oz)   10/18/23 105 kg (230 lb 9.6 " "oz)   23 98.9 kg (218 lb)   22 99.3 kg (219 lb)     Estimated body mass index is 43.93 kg/m² as calculated from the following:    Height as of 24: 5' 3\" (1.6 m).    Weight as of 24: 112 kg (248 lb).   Recent Weight Change: [x]Yes   as per 24 wt  []No  Amount:  + 30# x 1.5 years      Energy Needs: Did not calculate macros at this time   No Known Allergies or intolerances NKFA   Social History     Substance and Sexual Activity   Alcohol Use Yes    Comment: socially    1-2x/month   Social History     Tobacco Use   Smoking Status Former    Current packs/day: 0.00    Types: Cigarettes    Quit date: 2022    Years since quittin.4   Smokeless Tobacco Never    N/A   Who shops? patient   Who cooks/cooking methods/Eating out/take out habits   patient  Food prepared outside of the house: 2-3x/month, goal is 1-2x/month     Exercise: Goes to the gym Monday/Wednesday/Friday, wife goes with her Friday.Pt has had this gym routine for the last 3-4 months.     Other: ie: Sleep habits/ stress level/ work habits household-lives with ?/ food security Goes to sleep around ~2 am, wakes up around 11 am-12 pm. Pt has trouble    Prior Nutritional Counseling? [x]Yes     []No  When:  24     Why:  Altered labs, hx of disordered eating        Diet Hx: dietary habits are varied d/t less structure, in routine. Sometimes may have a few snacks after breakfast and then eat a larger meal  Breakfast: 11 am-12 pm Scrambled eggs (2) + shredded cheese + small avocado + breakfast sausage link (has this breakfast every other day)  or  Smoothie Oat milk + 1 scoop of protein powder +kale    Lunch: varies         Dinner: 8-9 pm 1 filet of salmon + rice & kepie singh + cucumber  Or  Palisade + avocado + potato egg salad  Or  Chicken + rice/pasta + sometimes a vegetable     Snacks: AM -   PM -   HS -    Other Notes/ Initial Assessment:      Pt lives with wife who is a vegetarian and they have different tastes in food. Main " proteins are chicken  Pt had an eating disorder from ~14 yo on and off until her 2nd year of college. Pt notes recent labs, especially lipid panel was high.  Pt feels like she has a good relationship with her body and her relationship with food but does want to slow wt gain and improve her labs but not falling back to disordered eating habits.  Pt's triglycerides increased over 100 from 10/23-04/24 hx an increase in ~20 for her cholesterol in the same time frame. Pt with hx of DM and the last time her A1c was checked was 05/2023. This writer reached out to pt's referring dr about getting an order to have A1c checked-pt is also agreeable to this.         Hx of eating disorders: started around 13 years old. She and her friend started to focus on wanting to lose wt. States on 15th or 16th birthday she remembers being on a diet where she mainly ate buckwheat and then would binge eat the next week, buckwheat one week, binge the next. Around that time she realized she started missing her period and it was a shock to her of not wanting to mess with her health. She started to eat more balanced eating and incorporate more fats and her period came back. From ~ 16 years old until 2nd year of college there was some restriction, negative body image, felt there were a lot of foods she couldn't eat. Pt used to weigh herself daily but then threw away her scale and now only gets wt checked at Dr's appointments.  Did not get wt today, will consider at future in person appointment.     Pt then started to focus less on eating after her 2nd year of college but then noticed her wt was creeping up when she would get weighed at the drs office. Pt notes during that time she would daydream and often jumping a lot. Pt tried to work on day dreaming less d/t feeling it is embarrassing and then also stopped jumping-feels like that was a good source of activity.  Pt still sometimes thinks about daydreaming and they got a trampoline but states she  "has to be in the mindset to daydream and jump so at this time.    Graduated 2022 at local [employer]. Pt was working but was recently laid off. Pt plans to start looking for work again after the summer. Pt's wife is currently working. Pt is home during the day.  Pt started working out to decompress after work. Goal is to work out to be healthy but still has the mindset of hyper fixating on burning more calories.  Pt feels in college she started to read more about body positivity and feminism and that was helpful for her mindset.     Fluids: since getting her labs back she is drinking 1-2 of her ~24-28 oz cup of water per day.  When its not that hot she will drink decaf black tea (1-4c/day), 1c seltzer per day.  Pt also notes eating more processed sugar snacks prior to her labs coming in. Now has been snacking on more fruit-often berries (fresh or frozen), or raw vegetables. Later on in the evening pt then will often eat more sweet things.     Pt states she isnt sure if she likes structure or if she doesn't. Right now she likes the idea of not having structure for most of her day but is ok with the idea of structuring some of her meals and feels that would be helpful. She would like to have some guidance on reducing foods that may be higher in cholesterol as well as increasing her vegetables. She would also like some guidance on balancing meals.  Sometime for later on may be \"decriminalizing\" sweets/desserts as well as finding some healthier alteratives for sweet foods.  Pt feels she is the type of person to have a larger portion of her favorite dessert less often vs having a smaller portion more often.     Follow up 08/06/24 Virtual, use email---ask about purging. If appointment is on a Tues/Thurs needs to be virtual d/t wife having the car, can come in person Monday or Friday.        Nutrition Diagnosis:   Altered Nutrition-Related Laboratory values  related to hyper triglyceridemia as evidenced by  elevated " "triglycerides.       Any change or new dx since previous visit:     Nutrition Diagnosis:         Medical Nutrition Therapy Intervention:  [x]Individualized Meal Plan: did not focus on calories/macro [x]Understanding Lab Values: lipid panel, BG, A1c   []Basic Pathophysiology of Disease []Food/Medication Interactions   []Food Diary [x]Exercise: recommend 150 min/week   [x]Lifestyle/Behavior Modification Techniques: finding balance, HAES, reducing cholesterol, increasing vegetables []Medication, Mechanism of Action   []Label Reading: CHO/ Na/ Fat/ other_________ []Self Blood Glucose Monitoring   []Weight/BMI Goals: gain/lose/maintain N/A []Other -           Comprehension: []Excellent  [x]Very Good  []Good  []Fair   []Poor    Receptivity: []Excellent  [x]Very Good  []Good  []Fair   []Poor    Expected Compliance: []Excellent  [x]Very Good  []Good  []Fair   []Poor        Goals (initial)/ Progress made on previous goals/new goals:  Every other time having eggs for breakfast make the following changeS: 3-4 egg whites (no whole eggs), add vegetables, substitute 1-2 pieces of higher fiber breakfast for breakfast meat   2. On the same says as having the altered egg white breakfast, for dinner: 1c starch/starchy vegetable, 1+ c non-starchy vegetable, 5-6oz lean protein   3.     Labs:  CMP  Lab Results   Component Value Date    K 4.4 04/22/2024     04/22/2024    CO2 28 04/22/2024    BUN 12 04/22/2024    CREATININE 0.75 04/22/2024    GLUF 84 04/22/2024    CALCIUM 9.0 04/22/2024    AST 20 04/22/2024    ALT 19 04/22/2024    ALKPHOS 98 04/22/2024    EGFR 112 04/22/2024       BMP  Lab Results   Component Value Date    CALCIUM 9.0 04/22/2024    K 4.4 04/22/2024    CO2 28 04/22/2024     04/22/2024    BUN 12 04/22/2024    CREATININE 0.75 04/22/2024       Lipids  No results found for: \"CHOL\"  Lab Results   Component Value Date    HDL 41 (L) 04/22/2024    HDL 43 (L) 10/21/2023     Lab Results   Component Value Date    LDLCALC " "99 04/22/2024    LDLCALC 102 (H) 10/21/2023     Lab Results   Component Value Date    TRIG 231 (H) 04/22/2024    TRIG 112 10/21/2023     No results found for: \"CHOLHDL\"    Hemoglobin A1C  Lab Results   Component Value Date    HGBA1C 5.3 05/11/2023       Fasting Glucose  Lab Results   Component Value Date    GLUF 84 04/22/2024       Insulin     Thyroid  Lab Results   Component Value Date    TSH 1.01 10/15/2022       Hepatic Function Panel  Lab Results   Component Value Date    ALT 19 04/22/2024    AST 20 04/22/2024    ALKPHOS 98 04/22/2024       Celiac Disease Antibody Panel  No results found for: \"ENDOMYSIAL IGA\", \"GLIADIN IGA\", \"GLIADIN IGG\", \"IGA\", \"TISSUE TRANSGLUT AB\", \"TTG IGA\"   Iron  Lab Results   Component Value Date    IRON 71 04/22/2024    TIBC 455 (H) 04/22/2024    FERRITIN 11 04/22/2024            ASHISH Zapata  [facility]UTR[facility]  [address] [place] [id number]    "

## 2024-07-10 ENCOUNTER — PROCEDURE VISIT (OUTPATIENT)
Dept: FAMILY MEDICINE CLINIC | Facility: CLINIC | Age: 24
End: 2024-07-10
Payer: COMMERCIAL

## 2024-07-10 DIAGNOSIS — G43.709 CHRONIC MIGRAINE WITHOUT AURA WITHOUT STATUS MIGRAINOSUS, NOT INTRACTABLE: Primary | ICD-10-CM

## 2024-07-10 DIAGNOSIS — M99.00 SOMATIC DYSFUNCTION OF HEAD REGION: ICD-10-CM

## 2024-07-10 DIAGNOSIS — M99.01 SOMATIC DYSFUNCTION OF CERVICAL REGION: ICD-10-CM

## 2024-07-10 PROCEDURE — 98926 OSTEOPATH MANJ 3-4 REGIONS: CPT

## 2024-07-10 NOTE — PROGRESS NOTES
"The Assessment & Plan       1. Chronic migraine without aura without status migrainosus, not intractable  OMT      2. Somatic dysfunction of head region  OMT      3. Somatic dysfunction of cervical region  OMT           1. Patient tolerated OMT well for the above problems,  advised patient to drink fluids and can use NSAID for soreness after treatment     2. OMT Follow up in 2 weeks.    Subjective     Ruthy Farley is a 24 y.o. female and is here for a OMT follow up for migraines. She has had them since she was an early teenager. Intensity varies with stress but on average gets them once a week. She was prescribed propanol and amitriptyline for preventing migraines but they did not work so she just proceeds to use Excedrin which she says does help 90% of the time. In terms of severity, when they come on, her day is \"pretty ruined.\" She does not report any nausea and vomiting with her migraines anymore. She says her migraines are mostly isolated to the side of her head but does not report any neck pain associated with them. Patient also reports shooting pain the right temporal.     Is the patient taking Pain medication? yes  Has the patient completed physical therapy for this condition? no  Did Patient symptoms improve from last OMT appointment?  First OMT appointment     The following portions of the patient's history were reviewed and updated as appropriate: allergies, current medications, past family history, past medical history, past social history, past surgical history, and problem list.    Review of Systems  Review of Systems   Musculoskeletal:  Positive for myalgias. Negative for neck pain and neck stiffness.   Neurological:  Positive for headaches.        Migraine         Objective     OMT Exam     OMT    Performed by: Aissatou Bagley DO  Authorized by: Aissatou Bagley DO  Universal Protocol:  Procedure performed by:  Consent: Verbal consent obtained.  Consent given by: patient  Patient understanding: " patient states understanding of the procedure being performed  Patient consent: the patient's understanding of the procedure matches consent given  Procedure consent: procedure consent matches procedure scheduled  Patient identity confirmed: verbally with patient      Procedure Details:     Region evaluated and treated:  Cervical and Head    Head Details:     Examination Method:  Tissue Texture Change, Stability, Laxity, Effusions, Tone, Asymmetry, Misalignment, Crepitation, Defects, Masses and Passive    Severity:  Moderate    Osteopathic Findings:  Hypertonic forehead muscles that have bind to the right     Treatment Method:  Cranial Treatment, Osteopathy in the Cranial Field, Cranial Osteopathy, Myofascial Release Treatment and Direct Treatment    Response:  Improved - The somatic dysfunction is improved but not completely resolved.    Cervical Details:     Examination Method:  Passive, Asymmetry, Misalignment, Crepitation, Defects, Masses and Tissue Texture Change, Stability, Laxity, Effusions, Tone    Severity:  Mild    Osteopathic Findings:  Tenderpoint elicited on C2 midline  Tenderpoint elicited on C2 lateral on the right   Tenderpoint elicited on C3 lateral on right  Restricted right sidebending   Hypertonic scalenes       Treatment Method:  Muscle Energy Treatment, Myofascial Release Treatment, Indirect Treatment, Direct Treatment, Soft Tissue Treatment, Counterstrain Treatment and Facilitated Positional Release Treatment    Response:  Improved - The somatic dysfunction is improved but not completely resolved.    Total Regions Treated:  2  Attending provider present in exam room for procedure: No

## 2024-07-14 DIAGNOSIS — E55.9 VITAMIN D INSUFFICIENCY: ICD-10-CM

## 2024-07-15 ENCOUNTER — TELEPHONE (OUTPATIENT)
Age: 24
End: 2024-07-15

## 2024-07-15 RX ORDER — ERGOCALCIFEROL 1.25 MG/1
50000 CAPSULE ORAL WEEKLY
Qty: 12 CAPSULE | Refills: 0 | Status: SHIPPED | OUTPATIENT
Start: 2024-07-15

## 2024-07-15 NOTE — TELEPHONE ENCOUNTER
PA for   ergocalciferol (VITAMIN D2) 50,000 units     Submitted via    [][facility]   [x][facility]-Case ID #   []Faxed to plan   []Other website   []Phone call Case ID #     Office notes sent, clinical questions answered. Awaiting determination    Turnaround time for your insurance to make a decision on your Prior Authorization can take 7-21 business days.

## 2024-07-17 ENCOUNTER — TELEPHONE (OUTPATIENT)
Age: 24
End: 2024-07-17

## 2024-07-17 NOTE — TELEPHONE ENCOUNTER
Pt. Stated that Dr. Gross gave referral for [facility], pt contacted [facility] and they asked for referral to be sent over directly from the provider for services.    Phone Number provided to pt. [phone]

## 2024-07-22 ENCOUNTER — PROCEDURE VISIT (OUTPATIENT)
Dept: FAMILY MEDICINE CLINIC | Facility: CLINIC | Age: 24
End: 2024-07-22
Payer: COMMERCIAL

## 2024-07-22 ENCOUNTER — OFFICE VISIT (OUTPATIENT)
Dept: NEUROLOGY | Facility: CLINIC | Age: 24
End: 2024-07-22
Payer: COMMERCIAL

## 2024-07-22 ENCOUNTER — TELEPHONE (OUTPATIENT)
Dept: PSYCHIATRY | Facility: CLINIC | Age: 24
End: 2024-07-22

## 2024-07-22 VITALS
BODY MASS INDEX: 42.88 KG/M2 | HEART RATE: 75 BPM | WEIGHT: 242 LBS | TEMPERATURE: 97.2 F | OXYGEN SATURATION: 95 % | SYSTOLIC BLOOD PRESSURE: 108 MMHG | HEIGHT: 63 IN | DIASTOLIC BLOOD PRESSURE: 60 MMHG

## 2024-07-22 DIAGNOSIS — M99.01 SOMATIC DYSFUNCTION OF CERVICAL REGION: ICD-10-CM

## 2024-07-22 DIAGNOSIS — G43.709 CHRONIC MIGRAINE WITHOUT AURA WITHOUT STATUS MIGRAINOSUS, NOT INTRACTABLE: ICD-10-CM

## 2024-07-22 DIAGNOSIS — G43.709 CHRONIC MIGRAINE WITHOUT AURA WITHOUT STATUS MIGRAINOSUS, NOT INTRACTABLE: Primary | ICD-10-CM

## 2024-07-22 DIAGNOSIS — M99.00 SOMATIC DYSFUNCTION OF HEAD REGION: ICD-10-CM

## 2024-07-22 PROCEDURE — 98926 OSTEOPATH MANJ 3-4 REGIONS: CPT

## 2024-07-22 PROCEDURE — 99204 OFFICE O/P NEW MOD 45 MIN: CPT

## 2024-07-22 RX ORDER — ATOGEPANT 60 MG/1
60 TABLET ORAL
Qty: 30 TABLET | Refills: 11 | Status: SHIPPED | OUTPATIENT
Start: 2024-07-22 | End: 2024-07-27 | Stop reason: ALTCHOICE

## 2024-07-22 NOTE — PROGRESS NOTES
[facility]  PATIENT:  Ruthy Farley  MRN:  [id number]  :  2000  DATE OF SERVICE:  2024  REFERRED BY: Teresa Montana DO  PCP: Teresa Montana DO    Assessment/Plan:     Chronic migraine without aura without status migrainosus, not intractable  She reports that her migraine headaches started around the age of 12.  Recently, they have started worsening in frequency and intensity without any trauma or event.  She is currently experiencing approximately 3 migraine headaches per week although she tracks her migraines on an anny and she has been having them much more frequently in July.  She sees her family doctor and they have tried amitriptyline and added on propranolol both of which have been ineffective for her migraines.  She uses Excedrin to alleviate her headaches and this is effective for her however she reports taking this medication more than 3 times in a week.  She may be experiencing a bit of a medication overuse/rebound headache which we discussed.  Will have her start Qulipta to see if she can get better control over her migraine frequency so she does not have to rely on the Excedrin as much.  Can consider adding on Imitrex as needed in the future  Workup:  Due to increased frequency and severity of headaches and migraines I recommend further evaluation with MRI brain without contrast to rule out structural or treatable causes of symptoms   Preventative:  We discussed headache hygiene and lifestyle factors that may improve headaches  Stop propranolol.    Stop amitriptyline- wean schedule provided to patient  Start Qulipta 60mg nightly- sent message to nursing team to work on prior auth  Past/ failed/contraindicated: amitriptyline, nortriptyline, and propranolol all ineffective  Future options:  Topamax, CGRP med, botox  Acute:  Discussed not taking over-the-counter or prescription pain medications more than 3 days per week to prevent medication  overuse/rebound headache  Continue excedrin since this has been completely effective.  No more than 3x per week. We discussed Imitrex if needed in the future  Past/ failed/contraindicated: ibuprofen (no longer effective)  Future options:  Other triptan (Maxalt), ubrelvy, reyvow, nurtec       Diagnoses and all orders for this visit:    Chronic migraine without aura without status migrainosus, not intractable  -     Ambulatory Referral to Neurology  -     MRI brain without contrast; Future  -     Atogepant (Qulipta) 60 MG TABS; Take 60 mg by mouth daily at bedtime         Patient should follow up in 2 months, or I would be happy to see her sooner if needed.  Patient should call the office or send a [employer] message with any questions or concerns.  Patient should present to the nearest emergency department with any concerning symptoms.     CC:     We had the pleasure of evaluating Ruthy in neurological consultation today. she is a 24 y.o. year-old female who presents today for evaluation of headaches.     History obtained from patient as well as available medical record review.    History of Present Illness:     She reports that her migraine headache started around the age of 12.  Recently, they have started worsening in frequency and intensity without any trauma or event.  She is currently experiencing approximately 3 migraine headaches per week although she tracks her migraines on an anny and she has been having them much more frequently in July.  She sees her family doctor and they have tried amitriptyline and added on propranolol both of which have been ineffective for her migraines.  She uses Excedrin to alleviate her headaches and this is effective for her however she reports taking this medication more than 3 times in a week.    Headaches started at what age?  12 years old  How often do the headaches occur? Approximately 3x per week  - as of 7/22/2024:   What time of the day do the headaches start?  Tend to  be later in the day but most of her life she would wake up with the headaches.   How long do the headaches last? Hours to a full day  Are you ever headache free? yes    Aura? without aura     Last eye exam: None recently.     Where is your headache located and pain quality? Right temple area throbbing occasionally stabbing pain through her right temple every couple of weeks and it lasts seconds.   What is the intensity of pain? 6/10  Associated symptoms:   [] Nausea       [] Vomiting        [] Diarrhea  [x] Insomnia    [] Stiff or sore neck   [x] Problems with concentration  [] Photophobia     []Phonophobia      [] Osmophobia  [] Blurred vision   [] Prefer quiet, dark room  [] Light-headed or dizzy     [] Tinnitus   [] Hands or feet tingle or feel numb/paresthesias    [] Ptosis      [] Facial droop  [] Lacrimation  [] Nasal congestion/rhinorrhea   [] Flushing of face      Things that make the headache worse? No specific movement    Headache triggers:  stress, lack of sleep, menstruation    Have you seen someone else for headaches or pain? Yes, PCP  Have you had trigger point injection performed and how often? No  Have you had Botox injection performed and how often? No   Have you had epidural injections or transforaminal injections performed? No  Are you current pregnant or planning on getting pregnant? No, has same sex partner  Have you ever had any Brain imaging? no    LIFESTYLE  Sleep   Averages: 8-9 hours per night  Problems falling asleep?:   No  Problems staying asleep?:  No  Do you snore while asleep?Yes, had sleep apnea testing and it was negative for MATTHEW  Physical activity: gym 3x per week  Water: 48oz per day  Caffeine: 1 cup 3x per week   Mood: stable mood per patient    The following portions of the patient's history were reviewed and updated as appropriate: allergies, current medications, past family history, past medical history, past social history, past surgical history and problem list.    Pertinent  family history:  Family history of headaches:  no known family members with significant headaches  Any family history of aneurysms - No    Pertinent social history:  Work: was laid off in may  Lives with lives with their spouse    Illicit Drugs: denies  Alcohol/tobacco: no smoking. Social alcohol but rarely 1x/month    What medications do you take or have you taken for your headaches?:    ABORTIVE:    OTC medications: Excedrin (effective)  Prescription: none  Past/failed/contraindicated: ibuprofen (no longer effective)    PREVENTIVE:   OTC medications: none  Prescription: Propranolol, amitriptyline (ineffective)  Medications from other providers: none  Past/failed/contraindicated: nortriptyline (ineffective)      Alternative therapies used in the past for headaches?   OMT    Past Medical History:     Past Medical History:   Diagnosis Date    Anxiety     Depression     Obsessive-compulsive disorder     Sleep difficulties        Past Surgical History:   Procedure Laterality Date    DENTAL SURGERY         Patient Active Problem List   Diagnosis    Encounter for screening for HIV    Rash    BMI 40.0-44.9, adult (HCC)    Inflamed skin tag    Chronic nonintractable headache    At risk for sleep apnea    Current severe episode of major depressive disorder without psychotic features (HCC)    Generalized anxiety disorder    Mixed obsessional thoughts and acts    Vitamin B12 deficiency    Hypertriglyceridemia    Chronic migraine without aura without status migrainosus, not intractable    Vitamin D deficiency    Ingrown nail of great toe    MATTHEW (obstructive sleep apnea)    Snoring       Medications:     Current Outpatient Medications   Medication Sig Dispense Refill    amitriptyline (ELAVIL) 10 mg tablet Take 2 tablets (20 mg total) by mouth daily at bedtime 60 tablet 5    Atogepant (Qulipta) 60 MG TABS Take 60 mg by mouth daily at bedtime 30 tablet 11    DULoxetine (CYMBALTA) 20 mg capsule Take 1 capsule (20 mg total) by  mouth 2 (two) times a day 60 capsule 3    ergocalciferol (VITAMIN D2) 50,000 units TAKE 1 CAPSULE BY MOUTH ONCE A WEEK 12 capsule 0    propranolol (INDERAL) 40 mg tablet TAKE ONE TABLET BY MOUTH TWICE A DAY 60 tablet 5    Sodium Fluoride 5000 PPM 1.1 % PSTE BRUSH THOROUGHLY ONCE DAILY PREFERABLY AT BEDTIME FOR 2 MINUTES, EXPECTORATE AFTER USE, DO NOT RINSE.      vitamin B-12 (VITAMIN B-12) 1,000 mcg tablet Take 1 tablet (1,000 mcg total) by mouth daily 30 tablet 3     No current facility-administered medications for this visit.        Allergies:     No Known Allergies    Family History:     Family History   Problem Relation Age of Onset    Diabetes Father     No Known Problems Brother     Breast cancer Neg Hx     Colon cancer Neg Hx     Ovarian cancer Neg Hx        Social History:     Social History     Socioeconomic History    Marital status: Single     Spouse name: Not on file    Number of children: Not on file    Years of education: Not on file    Highest education level: Not on file   Occupational History    Occupation: Healthcare Coordinator   Tobacco Use    Smoking status: Former     Current packs/day: 0.00     Types: Cigarettes     Quit date: 2022     Years since quittin.4    Smokeless tobacco: Never   Vaping Use    Vaping status: Never Used   Substance and Sexual Activity    Alcohol use: Yes     Comment: socially    Drug use: Not Currently    Sexual activity: Yes     Partners: Female     Birth control/protection: None   Other Topics Concern    Not on file   Social History Narrative    Not on file     Social Determinants of Health     Financial Resource Strain: Low Risk  (2022)    Overall Financial Resource Strain (CARDIA)     Difficulty of Paying Living Expenses: Not hard at all   Food Insecurity: No Food Insecurity (2022)    Hunger Vital Sign     Worried About Running Out of Food in the Last Year: Never true     Ran Out of Food in the Last Year: Never true   Transportation Needs: No  "Transportation Needs (12/5/2022)    PRAPARE - Transportation     Lack of Transportation (Medical): No     Lack of Transportation (Non-Medical): No   Physical Activity: Not on file   Stress: No Stress Concern Present (12/5/2022)    [employer] [place] [employer] - Occupational Stress Questionnaire     Feeling of Stress : Not at all   Social Connections: Moderately Isolated (10/4/2022)    [facility]    Social Connection and Isolation Panel [NHANES]     Frequency of Communication with Friends and Family: Three times a week     Frequency of Social Gatherings with Friends and Family: More than three times a week     Attends Jehovah's witness Services: Never     Active Member of Clubs or Organizations: No     Attends Club or Organization Meetings: Never     Marital Status:    Intimate Partner Violence: Not At Risk (12/5/2022)    Humiliation, Afraid, Rape, and Kick questionnaire     Fear of Current or Ex-Partner: No     Emotionally Abused: No     Physically Abused: No     Sexually Abused: No   Housing Stability: Low Risk  (12/5/2022)    Housing Stability Vital Sign     Unable to Pay for Housing in the Last Year: No     Number of Places Lived in the Last Year: 2     Unstable Housing in the Last Year: No       Objective:     Physical Exam:    Vitals:  /60 (BP Location: Left arm, Patient Position: Sitting, Cuff Size: Large)   Pulse 75   Temp (!) 97.2 °F (36.2 °C) (Temporal)   Ht 5' 3\" (1.6 m)   Wt 110 kg (242 lb)   SpO2 95%   BMI 42.87 kg/m²   BP Readings from Last 3 Encounters:   07/22/24 108/60   06/26/24 120/76   05/13/24 110/72     Pulse Readings from Last 3 Encounters:   07/22/24 75   06/26/24 92   05/13/24 86       Constitutional:       Appearance: Normal appearance.   HENT:      Head: Normocephalic and atraumatic.      Nose: Nose normal.      Mouth: Mucous membranes are moist.   Pulmonary:      Effort: Pulmonary effort is normal.  "   Skin:     General: Skin is warm and dry.   Psychiatric:        Affect normal.   Neurologic Exam     Mental Status   Oriented to person, place, and time.   Speech: speech is normal   Level of consciousness: alert  Able to name object.     Cranial Nerves   Cranial nerves II through XII intact.     Motor Exam   Muscle bulk: normal  Right arm pronator drift: absent  Left arm pronator drift: absent    Strength   Strength 5/5 throughout.     Sensory Exam   Light touch normal.     Gait, Coordination, and Reflexes     Gait  Gait: normal    Coordination   Romberg: negative  Finger to nose coordination: normal    Tremor   Resting tremor: absent      Pertinent lab results:   Lab Results   Component Value Date    WBC 11.74 (H) 06/28/2024    HGB 12.7 06/28/2024    HCT 41.3 06/28/2024    MCV 82 06/28/2024     06/28/2024     Lab Results   Component Value Date    SODIUM 136 04/22/2024    K 4.4 04/22/2024     04/22/2024    CO2 28 04/22/2024    AGAP 6 04/22/2024    BUN 12 04/22/2024    CREATININE 0.75 04/22/2024    GLUC 86 05/11/2023    GLUF 84 04/22/2024    CALCIUM 9.0 04/22/2024    AST 20 04/22/2024    ALT 19 04/22/2024    ALKPHOS 98 04/22/2024    TP 7.4 04/22/2024    TBILI 0.35 04/22/2024    EGFR 112 04/22/2024     Lab Results   Component Value Date    RVO1CLJEBAXJ 1.752 04/22/2024    TSH 1.01 10/15/2022     Lab Results   Component Value Date    NYHLMOAW68 347 06/28/2024      Lab Results   Component Value Date    IRON 71 04/22/2024    TIBC 455 (H) 04/22/2024    FERRITIN 11 04/22/2024 4/22/24 Vitamin D  12.3(L)   ** Vitamin D and B12 are being supplemented by PCP    Pertinent Imaging:   No neuro imaging to review.      Review of Systems:     Constitutional: Negative.    HENT: Negative.     Eyes: Negative.    Respiratory: Negative.     Cardiovascular: Negative.    Gastrointestinal: Negative.    Endocrine: Negative.    Genitourinary: Negative.    Musculoskeletal: Negative.    Skin: Negative.     Allergic/Immunologic: Negative.    Neurological:  Positive for weakness and headaches (every 2-3 days).   Hematological: Negative.    Psychiatric/Behavioral:  Positive for sleep disturbance.      Reviewed ROS as entered by medical assistant.     I have spent a total time of 35 minutes on 07/22/24 in caring for this patient including Diagnostic results, Prognosis, Risks and benefits of tx options, Instructions for management, Patient and family education, Importance of tx compliance, Risk factor reductions, Impressions, Counseling / Coordination of care, Documenting in the medical record, Reviewing / ordering tests, medicine, procedures  , and Obtaining or reviewing history  .       Author:  SANDEE Toro 7/22/2024 4:07 PM

## 2024-07-22 NOTE — PROGRESS NOTES
The Assessment & Plan     This is a 24 y.o. female who presents for OMT follow-up for:  1. Chronic migraine without aura without status migrainosus, not intractable  OMT      2. Somatic dysfunction of head region  OMT      3. Somatic dysfunction of cervical region  OMT           1. Patient tolerated OMT well for the above problems,  advised patient to drink fluids and can use NSAID for soreness after treatment     2. OMT Follow up in 3 weeks.    Jaimie Farley is a 24 y.o. female and is here for a OMT follow up. The patient reports migraine frequency of every 3-nathalie days give or take. Intensity is better as she started taking Excedrin right away when she feels the migraine coming on.     Is the patient taking Pain medication? yes  Has the patient completed physical therapy for this condition? no  Did Patient symptoms improve from last OMT appointment? no    The following portions of the patient's history were reviewed and updated as appropriate: allergies, current medications, past family history, past medical history, past social history, past surgical history, and problem list.    Review of Systems  Review of Systems   Neurological:         Migraines         Objective     OMT Exam     OMT    Performed by: Aissatou Bagley DO  Authorized by: Aissatou Bagley DO  Universal Protocol:  Consent: Verbal consent obtained.  Risks and benefits: risks, benefits and alternatives were discussed  Consent given by: patient  Patient understanding: patient states understanding of the procedure being performed  Patient consent: the patient's understanding of the procedure matches consent given  Procedure consent: procedure consent matches procedure scheduled  Patient identity confirmed: verbally with patient      Procedure Details:     Region evaluated and treated:  Cervical and Head    Head Details:     Examination Method:  Tissue Texture Change, Stability, Laxity, Effusions, Tone and Asymmetry, Misalignment,  Crepitation, Defects, Masses    Severity:  Moderate    Treatment Method:  Indirect Treatment, Myofascial Release Treatment and Soft Tissue Treatment    Response:  Improved - The somatic dysfunction is improved but not completely resolved.    Cervical Details:     Examination Method:  Tissue Texture Change, Stability, Laxity, Effusions, Tone and Asymmetry, Misalignment, Crepitation, Defects, Masses    Severity:  Moderate    Treatment Method:  Counterstrain Treatment, Direct Treatment, Indirect Treatment, Muscle Energy Treatment, Myofascial Release Treatment and Soft Tissue Treatment    Total Regions Treated:  2  Attending provider present in exam room for procedure: No

## 2024-07-22 NOTE — PATIENT INSTRUCTIONS
Patient Instructions:    Additional Testing  -I am recommending further Neurodiagnostic workup at this time: MRI Brain ordered    Headache/migraine treatment:     Prevention  -To take every day to help prevent headaches - not to take at the time of headache:  -Stop propranolol.  Wean off of amitriptyline. 10mg nightly x 7 days then stop.    -Start Qulipta 60mg nightly- sent message to nursing team to work on prior auth  -Over the counter preventive supplements for headaches/migraines (if you try, try for 3 months straight):  -Magnesium 400mg daily (If any diarrhea or upset stomach, decrease dose as tolerated)  -Riboflavin (Vitamin B2) 400mg daily (may make your urine bright/neon yellow)  - All supplements can be purchased online    Abortive  -For immediate treatment of a headache/migraine  -For your more moderate to severe migraines take this medication as early as possible:  -Continue Excedrin for now since it's effective. May consider Imitrex in future if it becomes less effective.    -It is ok to take ibuprofen, acetaminophen or naproxen (Advil, Tylenol,  Aleve, Excedrin) if they help your headaches you should limit these to No more than 3 times a week to avoid medication overuse/rebound headaches.     Lifestyle Recommendations:    -Remain well-hydrated drinking at least 48 to 64 ounces of noncaffeinated beverages per day in addition to anything caffeinated.    -Getting adequate rest is also very important for migraine prevention (aim for 7-8 hours per night).     -Regular exercise is also beneficial for headache prevention.  I would encourage at the least 5 days of physical exercise weekly for at least 30 minutes.   -I would like for them to keep track of their migraines using an application on their phone or calendar as they see fit. Phone applications: Migraine [place] or Migraine Diary.    Education and Follow-up:    -Please call or send a [employer] message with any questions or concerns. Please present to the  emergency room with any concerning symptoms such as: worst headache of your life, sudden painless loss of vision or double vision, difficulty speaking or swallowing, vertigo/room spinning that does not quickly resolve, or weakness/numbness/loss of coordination affecting 1 side of the face or body.  -Follow up in 2 months or sooner if needed.

## 2024-07-22 NOTE — TELEPHONE ENCOUNTER
Patient was in office today to complete an ANGEL for [facility]. (Scanned in media).  Faxed Referral to  [facility]. (Scanned in media)    Patient picked up medication escitalopram from her pharmacy today and wanted to make sure she wasn't supposed to be taking this medicine.  Please Advise for further instructions.

## 2024-07-24 ENCOUNTER — TELEPHONE (OUTPATIENT)
Age: 24
End: 2024-07-24

## 2024-07-24 DIAGNOSIS — G43.709 CHRONIC MIGRAINE WITHOUT AURA WITHOUT STATUS MIGRAINOSUS, NOT INTRACTABLE: Primary | ICD-10-CM

## 2024-07-24 NOTE — TELEPHONE ENCOUNTER
Called pt's pharmacy. Qulipta requires a prior auth. This can be submitted through [facility], Key # [id number].

## 2024-07-26 NOTE — TELEPHONE ENCOUNTER
Qulipta has been denied. Denial letter is scanned under media tab. Denied because pt must have tried 2 formulary alternatives. Formulary alternatives include: Aimovig, Ajovy, and Emgality. Routed to provider to advise. The alternative medication would also require a prior auth.

## 2024-07-26 NOTE — TELEPHONE ENCOUNTER
Spoke with pt. She is willing to try Emgality. Please send a script to [facility], and the clinical team will work on the PA. Thank you!

## 2024-07-27 NOTE — TELEPHONE ENCOUNTER
Please make patient aware that Emgality was sent to her pharmacy.  This medication requires a loading dose for the first month.  Thanks!

## 2024-08-01 ENCOUNTER — HOSPITAL ENCOUNTER (OUTPATIENT)
Dept: MRI IMAGING | Facility: HOSPITAL | Age: 24
End: 2024-08-01
Payer: COMMERCIAL

## 2024-08-01 DIAGNOSIS — F42.2 MIXED OBSESSIONAL THOUGHTS AND ACTS: ICD-10-CM

## 2024-08-01 DIAGNOSIS — F41.1 GENERALIZED ANXIETY DISORDER: ICD-10-CM

## 2024-08-01 DIAGNOSIS — G43.709 CHRONIC MIGRAINE WITHOUT AURA WITHOUT STATUS MIGRAINOSUS, NOT INTRACTABLE: ICD-10-CM

## 2024-08-01 DIAGNOSIS — F33.2 SEVERE EPISODE OF RECURRENT MAJOR DEPRESSIVE DISORDER, WITHOUT PSYCHOTIC FEATURES (HCC): ICD-10-CM

## 2024-08-01 PROCEDURE — 70551 MRI BRAIN STEM W/O DYE: CPT

## 2024-08-01 RX ORDER — DULOXETIN HYDROCHLORIDE 20 MG/1
20 CAPSULE, DELAYED RELEASE ORAL 2 TIMES DAILY
Qty: 60 CAPSULE | Refills: 3 | Status: CANCELLED | OUTPATIENT
Start: 2024-08-01

## 2024-08-01 NOTE — TELEPHONE ENCOUNTER
Patient called and requested a refill of DULoxetine (CYMBALTA) 20 mg capsule to be filled at the [facility] in [address]. Patient will be out of medication after tomorrow.    Thank you.

## 2024-08-05 ENCOUNTER — TELEPHONE (OUTPATIENT)
Dept: OTHER | Facility: OTHER | Age: 24
End: 2024-08-05

## 2024-08-06 ENCOUNTER — CLINICAL SUPPORT (OUTPATIENT)
Dept: NUTRITION | Facility: HOSPITAL | Age: 24
End: 2024-08-06
Payer: COMMERCIAL

## 2024-08-06 ENCOUNTER — TELEMEDICINE (OUTPATIENT)
Dept: PSYCHIATRY | Facility: CLINIC | Age: 24
End: 2024-08-06
Payer: COMMERCIAL

## 2024-08-06 DIAGNOSIS — F33.2 SEVERE EPISODE OF RECURRENT MAJOR DEPRESSIVE DISORDER, WITHOUT PSYCHOTIC FEATURES (HCC): ICD-10-CM

## 2024-08-06 DIAGNOSIS — F41.1 GENERALIZED ANXIETY DISORDER: ICD-10-CM

## 2024-08-06 DIAGNOSIS — F42.2 MIXED OBSESSIONAL THOUGHTS AND ACTS: Primary | ICD-10-CM

## 2024-08-06 PROCEDURE — 99214 OFFICE O/P EST MOD 30 MIN: CPT | Performed by: NURSE PRACTITIONER

## 2024-08-06 PROCEDURE — 97803 MED NUTRITION INDIV SUBSEQ: CPT

## 2024-08-06 PROCEDURE — 90833 PSYTX W PT W E/M 30 MIN: CPT | Performed by: NURSE PRACTITIONER

## 2024-08-06 NOTE — TELEPHONE ENCOUNTER
Patient states her insurance is being charged for visits she is doing as part of an OMT study, please call patient back to clarify.    Ruthy [phone]

## 2024-08-06 NOTE — PROGRESS NOTES
" Nutrition Assessment Form    Patient Name: Ruthy Farley    YOB: 2000    Sex: Female     Assessment Date: 8/6/2024  Start Time: 2:05 PM Stop Time: 2:35 PM Total Minutes: 30     Data:  Present at session: self   Parent/Patient Concerns/reason for visit:  \"I am doing better\"   Medical Dx/Reason for Referral:  E78.1 (ICD-10-CM) - Hypertriglyceridemia  Provider: Teresa Montana DO   Referred d/t hx of ED/DE   Past Medical History:   Diagnosis Date    Anxiety     Depression     Obsessive-compulsive disorder     Sleep difficulties        Current Outpatient Medications   Medication Sig Dispense Refill    amitriptyline (ELAVIL) 10 mg tablet Take 2 tablets (20 mg total) by mouth daily at bedtime 60 tablet 5    DULoxetine (CYMBALTA) 20 mg capsule Take 1 capsule (20 mg total) by mouth 2 (two) times a day 60 capsule 3    ergocalciferol (VITAMIN D2) 50,000 units TAKE 1 CAPSULE BY MOUTH ONCE A WEEK 12 capsule 0    Galcanezumab-gnlm 120 MG/ML SOAJ Inject 1 mL under the skin every 30 (thirty) days Begin 30 days after loading dose 1 mL 11    propranolol (INDERAL) 40 mg tablet TAKE ONE TABLET BY MOUTH TWICE A DAY 60 tablet 5    Sodium Fluoride 5000 PPM 1.1 % PSTE BRUSH THOROUGHLY ONCE DAILY PREFERABLY AT BEDTIME FOR 2 MINUTES, EXPECTORATE AFTER USE, DO NOT RINSE.      vitamin B-12 (VITAMIN B-12) 1,000 mcg tablet Take 1 tablet (1,000 mcg total) by mouth daily 30 tablet 3     No current facility-administered medications for this visit.        Additional Meds/Supplements: N/A   Special Learning Needs/barriers to learning/any new barriers N/A   Height: 5'3\" Ht Readings from Last 5 Encounters:   07/22/24 5' 3\" (1.6 m)   06/27/24 5' 3\" (1.6 m)   06/26/24 5' 3\" (1.6 m)   05/13/24 5' 3\" (1.6 m)   02/12/24 5' 3\" (1.6 m)      Weight: virtual Wt Readings from Last 10 Encounters:   07/22/24 110 kg (242 lb)   06/26/24 109 kg (241 lb 3.2 oz)   05/16/24 112 kg (246 lb 6.4 oz)   05/13/24 111 kg (244 lb 9.6 oz) " "  24 107 kg (236 lb 9.6 oz)   10/31/23 104 kg (228 lb 9.6 oz)   10/18/23 105 kg (230 lb 9.6 oz)   23 98.9 kg (218 lb)   22 99.3 kg (219 lb)     Estimated body mass index is 42.87 kg/m² as calculated from the following:    Height as of 24: 5' 3\" (1.6 m).    Weight as of 24: 110 kg (242 lb).   Recent Weight Change: [x]Yes   as per 24 wt  []No  Amount:  Relatively stable wt x 2mo      Energy Needs: Did not calculate macros at this time   No Known Allergies or intolerances NKFA   Social History     Substance and Sexual Activity   Alcohol Use Yes    Comment: socially    1-2x/month   Social History     Tobacco Use   Smoking Status Former    Current packs/day: 0.00    Types: Cigarettes    Quit date: 2022    Years since quittin.5   Smokeless Tobacco Never    N/A   Who shops? patient   Who cooks/cooking methods/Eating out/take out habits   patient  Food prepared outside of the house: 2-3x/month, goal is 1-2x/month     Exercise: Goes to the gym Monday/Wednesday/Friday, wife goes with her Friday.Pt has had this gym routine for the last 3-4 months.     Other: ie: Sleep habits/ stress level/ work habits household-lives with ?/ food security Goes to sleep around ~2 am, wakes up around 11 am-12 pm. Pt has trouble    Prior Nutritional Counseling? [x]Yes     []No  When:  24     Why:  Initial appointment with this writer.        Diet Hx: dietary habits are varied d/t less structure, in routine. Sometimes may have a few snacks after breakfast and then eat a larger meal  Breakfast: 11 am-12 pm Scrambled eggs (2) + shredded cheese + small avocado + breakfast sausage link (has this breakfast every other day)  or  Smoothie Oat milk + 1 scoop of protein powder +kale    Lunch: varies         Dinner: 8-9 pm 1 filet of salmon + rice & kepie singh + cucumber  Or  Colbert + avocado + potato egg salad  Or  Chicken + rice/pasta + sometimes a vegetable     Snacks: AM -   PM -   HS -    Other Notes/ " "Initial Assessment:    The patient was identified by name and date of birth. Ruthy was informed that this is a telemedicine visit and that the visit is being conducted through [employer]. She agrees to proceed..  My office door was closed. No one else was in the room.  She acknowledged consent and understanding of privacy and security of the video platform. The patient has agreed to participate and understands they can discontinue the visit at any time. Patient is aware this is a billable service.     08/06/24    Pt has met goal #1 for breakfast, also switched to low-fat cheese instead of full fat cheese.  As for goal #2 Pt has started to make a salad with red/white cabbage, other vegetables, beans--likes the way that this gets more vegetables and fiber, more balanced meals. She has been getting more interested and motivated to make meals where as she wasn't as much before.   Pt also states it makes her feel good about herself that she is meeting her goals.  When discussed continuing the goals or making new ones. This writer gave pt the ok if she wants to stay with the same goals if she feels she still has to work at meeting them... until they become more of a second nature habit. Pt stated she would like to do this also in line with helping her to find balance vs needing to do \"all of the things\" all at once.       Trying to limit sweets to 1 sweet treat at the end of the day and make some other changes but then felt overwhelmed and triggered (r/t disordered eating) so decided to go back to what had been discussed with sticking with only working on our previously set goals.    Pt has been consistent with going to the gym 3 days a week and proud of herself for sticking with it.  Pt also went to a restorative yoga class and liked it, felt it helped with mindfulness, this class is offered ~ once a month as far as pt knowsn.     Pt is working on figuring gout her hunger and full cues. Feels a hard time " deciding if she wants to eat something because is hungry for it, if she is craving it, reviewed additional tips for mindfulness. Also suggested trying to have something healthier but sweet like fruit, low-fat yogurt either before eating her sweet treat or after.     Pt states since she is going to thr gym she is daydreaming less and has less need for the trampoline. She states she is also starting to look for a job.     F/U 09/24/24  ____________________________________________________________________________________________________________________________________________  07/08/24  Pt lives with wife who is a vegetarian and they have different tastes in food. Main proteins are chicken  Pt had an eating disorder from ~12 yo on and off until her 2nd year of college. Pt notes recent labs, especially lipid panel was high.  Pt feels like she has a good relationship with her body and her relationship with food but does want to slow wt gain and improve her labs but not falling back to disordered eating habits.  Pt's triglycerides increased over 100 from 10/23-04/24 hx an increase in ~20 for her cholesterol in the same time frame. Pt with hx of DM and the last time her A1c was checked was 05/2023. This writer reached out to pt's referring dr about getting an order to have A1c checked-pt is also agreeable to this.         Hx of eating disorders: started around 13 years old. She and her friend started to focus on wanting to lose wt. States on 15th or 16th birthday she remembers being on a diet where she mainly ate buckwheat and then would binge eat the next week, buckwheat one week, binge the next. Around that time she realized she started missing her period and it was a shock to her of not wanting to mess with her health. She started to eat more balanced eating and incorporate more fats and her period came back. From ~ 16 years old until 2nd year of college there was some restriction, negative body image, felt there were a  lot of foods she couldn't eat. Pt used to weigh herself daily but then threw away her scale and now only gets wt checked at Dr's appointments.  Did not get wt today, will consider at future in person appointment.     Pt then started to focus less on eating after her 2nd year of college but then noticed her wt was creeping up when she would get weighed at the drs office. Pt notes during that time she would daydream and often jumping a lot. Pt tried to work on day dreaming less d/t feeling it is embarrassing and then also stopped jumping-feels like that was a good source of activity.  Pt still sometimes thinks about daydreaming and they got a trampoline but states she has to be in the mindset to daydream and jump so at this time.    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 Pt was working but was recently laid off. Pt plans to start looking for work again after the summer. Pt's wife is currently working. Pt is home during the day.  Pt started working out to decompress after work. Goal is to work out to be healthy but still has the mindset of hyper fixating on burning more calories.  Pt feels in college she started to read more about body positivity and feminism and that was helpful for her mindset.     Fluids: since getting her labs back she is drinking 1-2 of her ~24-28 oz cup of water per day.  When its not that hot she will drink decaf black tea (1-4c/day), 1c seltzer per day.  Pt also notes eating more processed sugar snacks prior to her labs coming in. Now has been snacking on more fruit-often berries (fresh or frozen), or raw vegetables. Later on in the evening pt then will often eat more sweet things.     Pt states she isnt sure if she likes structure or if she doesn't. Right now she likes the idea of not having structure for most of her day but is ok with the idea of structuring some of her meals and feels that would be helpful. She would like to have some guidance on reducing foods that may be higher in cholesterol as  "well as increasing her vegetables. She would also like some guidance on balancing meals.  Sometime for later on may be \"decriminalizing\" sweets/desserts as well as finding some healthier alteratives for sweet foods.  Pt feels she is the type of person to have a larger portion of her favorite dessert less often vs having a smaller portion more often.     Follow up 08/06/24 Virtual, use email---ask about purging. If appointment is on a Tues/Thurs needs to be virtual d/t wife having the car, can come in person Monday or Friday.        Nutrition Diagnosis:   Altered Nutrition-Related Laboratory values  related to hyper triglyceridemia as evidenced by  elevated triglycerides.       Any change or new dx since previous visit:     Nutrition Diagnosis:         Medical Nutrition Therapy Intervention:  [x]Individualized Meal Plan: did not focus on calories/macro [x]Understanding Lab Values: lipid panel, BG, A1c   []Basic Pathophysiology of Disease []Food/Medication Interactions   []Food Diary [x]Exercise: recommend 150 min/week   [x]Lifestyle/Behavior Modification Techniques: finding balance, HAES, reducing cholesterol, increasing vegetables []Medication, Mechanism of Action   []Label Reading: CHO/ Na/ Fat/ other_________ []Self Blood Glucose Monitoring   []Weight/BMI Goals: gain/lose/maintain N/A []Other -           Comprehension: []Excellent  [x]Very Good  []Good  []Fair   []Poor    Receptivity: []Excellent  [x]Very Good  []Good  []Fair   []Poor    Expected Compliance: []Excellent  [x]Very Good  []Good  []Fair   []Poor          Goals (initial): 08/06/24--wants to   Every other time having eggs for breakfast make the following changeS: 3-4 egg whites (no whole eggs), add vegetables, substitute 1-2 pieces of higher fiber breakfast for breakfast meat   2. On the same says as having the altered egg white breakfast, for dinner: 1c starch/starchy vegetable, 1+ c non-starchy vegetable, 5-6oz lean protein   3.     Goals (initial): " "07/08/24  Every other time having eggs for breakfast make the following changeS: 3-4 egg whites (no whole eggs), add vegetables, substitute 1-2 pieces of higher fiber breakfast for breakfast meat   2. On the same says as having the altered egg white breakfast, for dinner: 1c starch/starchy vegetable, 1+ c non-starchy vegetable, 5-6oz lean protein   3.     Labs:  CMP  Lab Results   Component Value Date    K 4.4 04/22/2024     04/22/2024    CO2 28 04/22/2024    BUN 12 04/22/2024    CREATININE 0.75 04/22/2024    GLUF 84 04/22/2024    CALCIUM 9.0 04/22/2024    AST 20 04/22/2024    ALT 19 04/22/2024    ALKPHOS 98 04/22/2024    EGFR 112 04/22/2024       BMP  Lab Results   Component Value Date    CALCIUM 9.0 04/22/2024    K 4.4 04/22/2024    CO2 28 04/22/2024     04/22/2024    BUN 12 04/22/2024    CREATININE 0.75 04/22/2024       Lipids  No results found for: \"CHOL\"  Lab Results   Component Value Date    HDL 41 (L) 04/22/2024    HDL 43 (L) 10/21/2023     Lab Results   Component Value Date    LDLCALC 99 04/22/2024    LDLCALC 102 (H) 10/21/2023     Lab Results   Component Value Date    TRIG 231 (H) 04/22/2024    TRIG 112 10/21/2023     No results found for: \"CHOLHDL\"    Hemoglobin A1C  Lab Results   Component Value Date    HGBA1C 5.3 05/11/2023       Fasting Glucose  Lab Results   Component Value Date    GLUF 84 04/22/2024       Insulin     Thyroid  Lab Results   Component Value Date    TSH 1.01 10/15/2022       Hepatic Function Panel  Lab Results   Component Value Date    ALT 19 04/22/2024    AST 20 04/22/2024    ALKPHOS 98 04/22/2024       Celiac Disease Antibody Panel  No results found for: \"ENDOMYSIAL IGA\", \"GLIADIN IGA\", \"GLIADIN IGG\", \"IGA\", \"TISSUE TRANSGLUT AB\", \"TTG IGA\"   Iron  Lab Results   Component Value Date    IRON 71 04/22/2024    TIBC 455 (H) 04/22/2024    FERRITIN 11 04/22/2024            ASHISH Zpaata  [facility] CLINICAL NUTRITION " [address] [id number]

## 2024-08-07 NOTE — PSYCH
Virtual Regular Visit    Problem List Items Addressed This Visit          Behavioral Health    Mixed obsessional thoughts and acts - Primary    Generalized anxiety disorder    Current severe episode of major depressive disorder without psychotic features (HCC)     Reason for visit is   Chief Complaint   Patient presents with    Anxiety    Depression    OCD    Medication Management     Encounter provider Marcia Gross, PhD    Provider located at [address]  [address]  [facility] [id number] [phone]    Recent Visits  Date Type Provider Dept   08/06/24 Telemedicine Marcia Gross, PhD  [facility]iatric [facility]   Showing recent visits within past 7 days and meeting all other requirements  Future Appointments  No visits were found meeting these conditions.  Showing future appointments within next 150 days and meeting all other requirements       After connecting through televideo, the patient was identified by name and date of birth. Ruthy Farley was informed that this is a telemedicine visit and that the visit is being conducted through the [employer] Embedded platform. She agrees to proceed. which may not be secure and therefore, might not be HIPAA-compliant.  My office door was closed. No one else was in the room.  She acknowledged consent and understanding of privacy and security of the video platform. The patient has agreed to participate and understands they can discontinue the visit at any time.    SUBJECTIVE:    Ruthy Farley is a 24 y.o. female with a history of OCD, anxiety, depression seen for medication management and mood assessment.  Ruthy reports she is feeling better, however still has some anxiety we discussed increasing Cymbalta to 60 mg the next refill and she agreed.  Her appetite is good and she is sleeping well wife is supportive.  Takes medication as prescribed and denies depression or  suicidal ideation family are supportive.  She continues to be laid off from work and is active at home    HPI ROS Appetite Changes and Sleep:.  Normal appetite and sleep and energy    Review Of Systems:     Mood Anxiety   Behavior Normal    Thought Content Normal   General Emotional Problems   Personality Normal   Other Psych Symptoms Normal   Constitutional As Noted in HPI   ENT As Noted in HPI   Cardiovascular As Noted in HPI   Respiratory As Noted in HPI   Gastrointestinal As Noted in HPI   Genitourinary As Noted in HPI   Musculoskeletal As Noted in HPI   Integumentary As Noted in HPI   Neurological As Noted in HPI   Endocrine Normal    Other Symptoms Normal        Substance Abuse History:    Social History     Substance and Sexual Activity   Drug Use Not Currently       Family Psychiatric History:     Family History   Problem Relation Age of Onset    Diabetes Father     No Known Problems Brother     Breast cancer Neg Hx     Colon cancer Neg Hx     Ovarian cancer Neg Hx        Social History     Socioeconomic History    Marital status: Single     Spouse name: Not on file    Number of children: Not on file    Years of education: Not on file    Highest education level: Not on file   Occupational History    Occupation: Healthcare Coordinator   Tobacco Use    Smoking status: Former     Current packs/day: 0.00     Types: Cigarettes     Quit date: 2022     Years since quittin.5    Smokeless tobacco: Never   Vaping Use    Vaping status: Never Used   Substance and Sexual Activity    Alcohol use: Yes     Comment: socially    Drug use: Not Currently    Sexual activity: Yes     Partners: Female     Birth control/protection: None   Other Topics Concern    Not on file   Social History Narrative    Not on file     Social Determinants of Health     Financial Resource Strain: Low Risk  (2022)    Overall Financial Resource Strain (CARDIA)     Difficulty of Paying Living Expenses: Not hard at all   Food Insecurity: No  Food Insecurity (12/5/2022)    Hunger Vital Sign     Worried About Running Out of Food in the Last Year: Never true     Ran Out of Food in the Last Year: Never true   Transportation Needs: No Transportation Needs (12/5/2022)    PRAPARE - Transportation     Lack of Transportation (Medical): No     Lack of Transportation (Non-Medical): No   Physical Activity: Not on file   Stress: No Stress Concern Present (12/5/2022)    [employer] [place] [employer] - Occupational Stress Questionnaire     Feeling of Stress : Not at all   Social Connections: Moderately Isolated (10/4/2022)    [facility]    Social Connection and Isolation Panel [NHANES]     Frequency of Communication with Friends and Family: Three times a week     Frequency of Social Gatherings with Friends and Family: More than three times a week     Attends Tenriism Services: Never     Active Member of Clubs or Organizations: No     Attends Club or Organization Meetings: Never     Marital Status:    Intimate Partner Violence: Not At Risk (12/5/2022)    Humiliation, Afraid, Rape, and Kick questionnaire     Fear of Current or Ex-Partner: No     Emotionally Abused: No     Physically Abused: No     Sexually Abused: No   Housing Stability: Low Risk  (12/5/2022)    Housing Stability Vital Sign     Unable to Pay for Housing in the Last Year: No     Number of Places Lived in the Last Year: 2     Unstable Housing in the Last Year: No       Past Medical History:   Diagnosis Date    Anxiety     Depression     Obsessive-compulsive disorder     Sleep difficulties        Past Surgical History:   Procedure Laterality Date    DENTAL SURGERY         Current Outpatient Medications   Medication Sig Dispense Refill    DULoxetine (CYMBALTA) 20 mg capsule Take 1 capsule (20 mg total) by mouth 2 (two) times a day 60 capsule 3    ergocalciferol (VITAMIN D2) 50,000 units TAKE 1 CAPSULE BY MOUTH ONCE A WEEK 12  capsule 0    Galcanezumab-gnlm 120 MG/ML SOAJ Inject 1 mL under the skin every 30 (thirty) days Begin 30 days after loading dose 1 mL 11    Sodium Fluoride 5000 PPM 1.1 % PSTE BRUSH THOROUGHLY ONCE DAILY PREFERABLY AT BEDTIME FOR 2 MINUTES, EXPECTORATE AFTER USE, DO NOT RINSE.      vitamin B-12 (VITAMIN B-12) 1,000 mcg tablet Take 1 tablet (1,000 mcg total) by mouth daily 30 tablet 3     No current facility-administered medications for this visit.        No Known Allergies    The following portions of the patient's history were reviewed and updated as appropriate: allergies, current medications, past family history, past medical history, past social history, past surgical history, and problem list.    OBJECTIVE:     Mental Status Examination:    Appearance calm and cooperative , adequate hygiene and grooming, and good eye contact    Mood anxious   Affect affect appropriate    Speech a normal rate   Thought Processes normal thought processes   Hallucinations no hallucinations present    Thought Content no delusions   Abnormal Thoughts no suicidal thoughts  and no homicidal thoughts    Orientation  oriented to person and place and time   Remote Memory short term memory intact and long term memory intact   Attention Span concentration intact   Intellect Appears to be of Average Intelligence   Insight Insight intact   Judgement judgment was intact   Muscle Strength Muscle strength and tone were normal   Language no difficulty naming common objects   Fund of Knowledge displays adequate knowledge of current events   Pain none   Pain Scale 0       Laboratory Results: No results found for this or any previous visit.    Assessment/Plan:       Diagnoses and all orders for this visit:    Mixed obsessional thoughts and acts    Severe episode of recurrent major depressive disorder, without psychotic features (HCC)    Generalized anxiety disorder          Treatment Recommendations- Risks Benefits      Immediate  "Medical/Psychiatric/Psychotherapy Treatments and Any Precautions: Continue with treatment plan increase Cymbalta to 60 mg daily next session    Risks, Benefits And Possible Side Effects Of Medications:  {PSYCH RISK, BENEFITS AND POSSIBLE SIDE EFFECTS (Optional):61512       Psychotherapy Provided: 30 minutes  Supportive therapy  Medication evaluation  Mood assessment  Normalized and validated her feelings      Goals discussed in session: Maintain stable mood       Treatment Plan:    Completed and signed during the session: Not applicable - Treatment Plan not due at this session    \"Portions of the record may have been created with voice recognition software. Occasional wrong word or \"sound a like\" substitutions may have occurred due to the inherent limitations of voice recognition software. Read the chart carefully and recognize, using context, where substitutions have occurred. Please call if you have any questions. \"      Marcia Gross, PhD 08/07/24  "

## 2024-08-08 ENCOUNTER — OFFICE VISIT (OUTPATIENT)
Dept: PODIATRY | Facility: CLINIC | Age: 24
End: 2024-08-08
Payer: COMMERCIAL

## 2024-08-08 VITALS — WEIGHT: 240 LBS | HEIGHT: 63 IN | BODY MASS INDEX: 42.52 KG/M2 | RESPIRATION RATE: 18 BRPM

## 2024-08-08 DIAGNOSIS — L60.0 INGROWN NAIL OF GREAT TOE: Primary | ICD-10-CM

## 2024-08-08 DIAGNOSIS — M79.675 PAIN IN TOE OF LEFT FOOT: ICD-10-CM

## 2024-08-08 PROCEDURE — 11750 EXCISION NAIL&NAIL MATRIX: CPT | Performed by: PODIATRIST

## 2024-08-08 PROCEDURE — RECHECK: Performed by: PODIATRIST

## 2024-08-08 RX ORDER — LIDOCAINE HYDROCHLORIDE AND EPINEPHRINE 10; 10 MG/ML; UG/ML
1 INJECTION, SOLUTION INFILTRATION; PERINEURAL ONCE
Status: COMPLETED | OUTPATIENT
Start: 2024-08-08 | End: 2024-08-08

## 2024-08-08 RX ORDER — LIDOCAINE HYDROCHLORIDE 10 MG/ML
1 INJECTION, SOLUTION EPIDURAL; INFILTRATION; INTRACAUDAL; PERINEURAL ONCE
Status: COMPLETED | OUTPATIENT
Start: 2024-08-08 | End: 2024-08-08

## 2024-08-08 RX ADMIN — LIDOCAINE HYDROCHLORIDE AND EPINEPHRINE 1 ML: 10; 10 INJECTION, SOLUTION INFILTRATION; PERINEURAL at 11:17

## 2024-08-08 RX ADMIN — LIDOCAINE HYDROCHLORIDE 1 ML: 10 INJECTION, SOLUTION EPIDURAL; INFILTRATION; INTRACAUDAL; PERINEURAL at 11:17

## 2024-08-08 NOTE — PROGRESS NOTES
Patient presents with chronic pain along the medial nail border left great toe secondary to ingrown nail.    Discussed treatment options recommending partial matrixectomy.  The goal of this procedure is permanent examination of the offending nail border.  Patient desires this procedure over partial avulsion today.  Procedure performed as follows:  Anesthesia via 2 cc of a 1:1 mixture of 1 percent xylocaine with epinephrine and 1 percent xylocaine plain.  A Betadine prep was performed.  The medial nail border of the left great toe was avulsed to the eponychium.  A partial matrixectomy was performed utilizing phenol in a standard manner.  A bacitracin dressing was applied.  The patient is to soak in warm water twice a day tomorrow followed by a Neosporin dressing.     Nail removal    Date/Time: 8/8/2024 10:30 AM    Performed by: Dwayne Killian DPM  Authorized by: Dwayne Killian DPM    Patient location:  [facility] Protocol:  Consent: Verbal consent obtained.  Risks and benefits: risks, benefits and alternatives were discussed  Consent given by: patient  Patient understanding: patient states understanding of the procedure being performed  Patient identity confirmed: verbally with patient    Location:     Foot:  L big toe  Pre-procedure details:     Skin preparation:  Betadine    Preparation: Patient was prepped and draped in the usual sterile fashion    Anesthesia (see MAR for exact dosages):     Anesthesia method:  Nerve block    Block needle gauge:  25 G    Block anesthetic:  Lidocaine 1% WITH epi and lidocaine 1% w/o epi    Block injection procedure:  Anatomic landmarks identified    Block outcome:  Anesthesia achieved  Nail Removal:     Nail removed:  Partial    Nail side:  Medial    Nail bed sutured: no    Ingrown nail:     Wedge excision of skin: no      Nail matrix removed or ablated:  Partial  Post-procedure details:     Dressing:  4x4 sterile gauze, antibiotic ointment and gauze roll    Patient  tolerance of procedure:  Tolerated well, no immediate complications

## 2024-08-10 ENCOUNTER — TELEPHONE (OUTPATIENT)
Age: 24
End: 2024-08-10

## 2024-08-10 NOTE — TELEPHONE ENCOUNTER
PA for Vitamin B-12 (VITAMIN B-12) 1,000 mcg tablet SUBMITTED     via    []CMM-KEY   [x][employer]-Case ID #: not provided  []Faxed to plan   []Other website   []Phone call Case ID #     Office notes sent, clinical questions answered. Awaiting determination    Turnaround time for your insurance to make a decision on your Prior Authorization can take 7-21 business days.

## 2024-08-12 ENCOUNTER — PROCEDURE VISIT (OUTPATIENT)
Dept: FAMILY MEDICINE CLINIC | Facility: CLINIC | Age: 24
End: 2024-08-12

## 2024-08-12 DIAGNOSIS — M99.01 SOMATIC DYSFUNCTION OF CERVICAL REGION: ICD-10-CM

## 2024-08-12 DIAGNOSIS — M99.02 SOMATIC DYSFUNCTION OF THORACIC REGION: ICD-10-CM

## 2024-08-12 DIAGNOSIS — M99.00 SOMATIC DYSFUNCTION OF HEAD REGION: ICD-10-CM

## 2024-08-12 DIAGNOSIS — G43.709 CHRONIC MIGRAINE WITHOUT AURA WITHOUT STATUS MIGRAINOSUS, NOT INTRACTABLE: Primary | ICD-10-CM

## 2024-08-12 NOTE — PROGRESS NOTES
The Assessment & Plan     This is a 24 y.o. female who presents for OMT follow-up for:  1. Chronic migraine without aura without status migrainosus, not intractable        2. Somatic dysfunction of cervical region        3. Somatic dysfunction of head region        4. Somatic dysfunction of thoracic region             1. Patient tolerated OMT well for the above problems,  advised patient to drink fluids and can use NSAID for soreness after treatment     2. OMT Follow up in 4 weeks.    Jaimie Farley is a 24 y.o. female and is here for a OMT follow up.  Patient following with neurology and currently started on Qulipta 60 mg nightly.  Past medications including amitriptyline, nortriptyline, and propranolol were all ineffective.  In this interval they note a slight improvement in the frequency of her headaches.  They note that they are experiencing a headache today, right-sided.  Excedrin does provide some relief as abortive therapy.    Is the patient taking Pain medication? yes  Has the patient completed physical therapy for this condition? no  Did Patient symptoms improve from last OMT appointment? yes    The following portions of the patient's history were reviewed and updated as appropriate: allergies, current medications, past family history, past medical history, past social history, past surgical history, and problem list.    Review of Systems  Review of Systems   Constitutional:  Negative for chills.   Respiratory:  Negative for shortness of breath.    Cardiovascular:  Negative for chest pain.   Gastrointestinal:  Negative for abdominal pain.   Neurological:  Positive for headaches. Negative for dizziness, seizures, syncope and speech difficulty.       Objective     OMT Exam     OMT    Performed by: Teresa Montana DO  Authorized by: Teresa Montana DO  Universal Protocol:  Consent: Verbal consent obtained.  Risks and benefits: risks, benefits and alternatives were  discussed  Consent given by: patient      Procedure Details:     Region evaluated and treated:  Head, Cervical and Thoracic    Thoracic Information  Thoracic Region: T1 - T4  Head Details:     Examination Method:  Tissue Texture Change, Stability, Laxity, Effusions, Tone    Severity:  Mild    Treatment Method:  Counterstrain Treatment and Myofascial Release Treatment    Response:  Improved - The somatic dysfunction is improved but not completely resolved.    Cervical Details:     Examination Method:  Tissue Texture Change, Stability, Laxity, Effusions, Tone and Range of Motion, Contracture    Severity:  Moderate    Osteopathic Findings:  Hypertonic left paraspinals and trapezius    Treatment Method:  Myofascial Release Treatment, Muscle Energy Treatment and Soft Tissue Treatment    Response:  Improved - The somatic dysfunction is improved but not completely resolved.    Thoracic T1 - T4 details:     Examination Method:  Tissue Texture Change, Stability, Laxity, Effusions, Tone    Severity:  Mild    Osteopathic Findings:  Hypertonicity of left trap      Treatment Method:  Myofascial Release Treatment, Muscle Energy Treatment and Soft Tissue Treatment    Response:  Improved    Total Regions Treated:  3

## 2024-08-13 NOTE — TELEPHONE ENCOUNTER
PA for Vitamin B-12 (VITAMIN B-12) 1,000 mcg tablet Denied- Will attach reason once denial letter is received    Reason:(Screenshot if applicable)        Message sent to office clinical pool Yes    Denial letter scanned into Media No      Appeal started No ( Provider will need to decide if appeal is warranted and send clinical documentation to Prior Authorization Team for initiation.)    **Please follow up with your patient regarding denial and next steps**

## 2024-08-13 NOTE — ASSESSMENT & PLAN NOTE
Currently following with nuerology  Medication management includes Qulipta 60mg nightly  Amitriptyline, nortriptyline, and propranolol all ineffective    Plan:  Continue Qulipta  Continue Excedrin as needed  - may consider triptan in future  Continue OMT follow-up

## 2024-08-15 ENCOUNTER — OFFICE VISIT (OUTPATIENT)
Dept: PODIATRY | Facility: CLINIC | Age: 24
End: 2024-08-15

## 2024-08-15 VITALS — BODY MASS INDEX: 42.52 KG/M2 | RESPIRATION RATE: 18 BRPM | WEIGHT: 240 LBS | HEIGHT: 63 IN

## 2024-08-15 DIAGNOSIS — L60.0 INGROWN NAIL OF GREAT TOE: Primary | ICD-10-CM

## 2024-08-15 PROCEDURE — 99024 POSTOP FOLLOW-UP VISIT: CPT | Performed by: PODIATRIST

## 2024-08-15 NOTE — PROGRESS NOTES
Patient presents 1 week post partial matrixectomy medial nail border left great toe secondary to ingrown nail.  Surgical site healing uneventfully with no pain noted.  Patient may discontinue soaks and Neosporin.  She should continue with dry sterile dressing until eschar forms.  Reappoint as needed

## 2024-08-23 ENCOUNTER — TELEPHONE (OUTPATIENT)
Dept: FAMILY MEDICINE CLINIC | Facility: CLINIC | Age: 24
End: 2024-08-23

## 2024-08-23 NOTE — TELEPHONE ENCOUNTER
Encounter for forms      Patient requires a form to be completed.  Patient is aware of 7-10 day turn around time.    Please refer to the following information:       Type of Form: [employer]    Date of Visit (if applicable):     Doctor: dr sears    Expected date: please review pw     How patient would like to receive form:if needed to be faxed back please send when completed     Fax number: [phone]      Patient phone number:       Copy scanned to encounter. Copy provided to patient. Original in (X) team folder to be completed.

## 2024-08-30 DIAGNOSIS — E53.8 VITAMIN B12 DEFICIENCY: ICD-10-CM

## 2024-09-02 DIAGNOSIS — G43.709 CHRONIC MIGRAINE WITHOUT AURA WITHOUT STATUS MIGRAINOSUS, NOT INTRACTABLE: ICD-10-CM

## 2024-09-02 RX ORDER — LANOLIN ALCOHOL/MO/W.PET/CERES
1000 CREAM (GRAM) TOPICAL DAILY
Qty: 30 TABLET | Refills: 0 | Status: SHIPPED | OUTPATIENT
Start: 2024-09-02

## 2024-09-04 ENCOUNTER — TELEPHONE (OUTPATIENT)
Age: 24
End: 2024-09-04

## 2024-09-04 NOTE — TELEPHONE ENCOUNTER
See telephone encounter from 9/4/24. [employer] required a prior auth and was approved. Refills are on file at the pharmacy. Refills are not needed at this time.

## 2024-09-04 NOTE — TELEPHONE ENCOUNTER
Please see my chart message encounter dated 9/4.  Waiting on updated insurance information; will then submit the prior authorization on an urgent basis

## 2024-09-04 NOTE — TELEPHONE ENCOUNTER
Forwarding to PA team for review.     Patient called and stated pharmacy advised a prior auth will be needed for Galcanezumab-gnlm 120 MG/ML SOAJ. Looks like it is still pending. Patient stated she needs to take this medication tomorrow, I did advise that a prior auth can take 7-21 business days.

## 2024-09-04 NOTE — TELEPHONE ENCOUNTER
Called pt's insurance #[id number]. ID# [id number]. Prior auth completed over the phone. Received immediate approval. Approval dates 8/5/24-9/4/25. PA Case ID# [id number], Called the pharmacy and made the pharmacist aware. She was able to obtain a paid claim. Sent pt a message through [employer].

## 2024-09-06 ENCOUNTER — CLINICAL SUPPORT (OUTPATIENT)
Dept: NUTRITION | Facility: HOSPITAL | Age: 24
End: 2024-09-06
Payer: COMMERCIAL

## 2024-09-06 DIAGNOSIS — E78.1 HYPERTRIGLYCERIDEMIA: Primary | ICD-10-CM

## 2024-09-06 PROCEDURE — 97803 MED NUTRITION INDIV SUBSEQ: CPT

## 2024-09-06 NOTE — PROGRESS NOTES
" Nutrition Assessment Form    Patient Name: Ruthy Farley    YOB: 2000    Sex: Female     Assessment Date: 9/6/2024  Start Time: 11:55 am Stop Time: 12:25 pm Total Minutes: 30     Data:  Present at session: self   Parent/Patient Concerns/reason for visit:  \"I have been doing the something 3 times a week other than eggs but its not going well\"   Medical Dx/Reason for Referral: LIFESTYLE NUTRITION-[facility]     E78.1 (ICD-10-CM) - Hypertriglyceridemia  Provider: Teresa Montana DO   Referred d/t hx of ED/DE   Past Medical History:   Diagnosis Date    Anxiety     Depression     Obsessive-compulsive disorder     Sleep difficulties        Current Outpatient Medications   Medication Sig Dispense Refill    DULoxetine (CYMBALTA) 20 mg capsule Take 1 capsule (20 mg total) by mouth 2 (two) times a day 60 capsule 3    ergocalciferol (VITAMIN D2) 50,000 units TAKE 1 CAPSULE BY MOUTH ONCE A WEEK 12 capsule 0    Galcanezumab-gnlm 120 MG/ML SOAJ Inject 1 mL under the skin every 30 (thirty) days Begin 30 days after loading dose 1 mL 11    Sodium Fluoride 5000 PPM 1.1 % PSTE BRUSH THOROUGHLY ONCE DAILY PREFERABLY AT BEDTIME FOR 2 MINUTES, EXPECTORATE AFTER USE, DO NOT RINSE.      vitamin B-12 (VITAMIN B-12) 1,000 mcg tablet Take 1 tablet (1,000 mcg total) by mouth daily 30 tablet 0     No current facility-administered medications for this visit.        Additional Meds/Supplements: N/A   Special Learning Needs/barriers to learning/any new barriers N/A   Height: 5'3\" Ht Readings from Last 5 Encounters:   08/15/24 5' 3\" (1.6 m)   08/08/24 5' 3\" (1.6 m)   07/22/24 5' 3\" (1.6 m)   06/27/24 5' 3\" (1.6 m)   06/26/24 5' 3\" (1.6 m)      Weight: virtual Wt Readings from Last 10 Encounters:   08/15/24 109 kg (240 lb)   08/08/24 109 kg (240 lb)   07/22/24 110 kg (242 lb)   06/26/24 109 kg (241 lb 3.2 oz)   05/16/24 112 kg (246 lb 6.4 oz)   05/13/24 111 kg (244 lb 9.6 oz)   02/12/24 107 kg (236 lb 9.6 oz) " "  10/31/23 104 kg (228 lb 9.6 oz)   10/18/23 105 kg (230 lb 9.6 oz)   23 98.9 kg (218 lb)     Estimated body mass index is 42.51 kg/m² as calculated from the following:    Height as of 8/15/24: 5' 3\" (1.6 m).    Weight as of 8/15/24: 109 kg (240 lb).   Recent Weight Change: [x]Yes   as per 08/15/24 wt  []No  Amount:  -6# x 3 mo      Energy Needs: Did not calculate macros at this time   No Known Allergies or intolerances NKFA   Social History     Substance and Sexual Activity   Alcohol Use Yes    Comment: socially    1-2x/month   Social History     Tobacco Use   Smoking Status Former    Current packs/day: 0.00    Types: Cigarettes    Quit date: 2022    Years since quittin.5   Smokeless Tobacco Never    N/A   Who shops? patient   Who cooks/cooking methods/Eating out/take out habits   patient  Food prepared outside of the house: 2-3x/month, goal is 1-2x/month     Exercise: Goes to the gym Monday/Wednesday/Friday, wife goes with her Friday. Pt has had this gym routine for the last 3-4 months.     Other: ie: Sleep habits/ stress level/ work habits household-lives with ?/ food security Goes to sleep around ~12 am, wakes up around 9 am .    Prior Nutritional Counseling? [x]Yes     []No  When:  24     Why:  Follow up with this writer.        Diet Hx: dietary habits are varied d/t less structure, in routine. Sometimes may have a few snacks after breakfast and then eat a larger meal  Breakfast: 10-11 am Scrambled eggs (2) + shredded cheese + small avocado + high fiber toast   Lunch: 1pm Leftovers from dinner   Or  Bagel sandwich with cheese & vegetables         Dinner: 6-7 pm 1 filet of salmon + rice & kepie singh + cucumber  Or  Friendship + avocado + potato egg salad  Or  Chicken + rice/pasta + sometimes a vegetable     Snacks: AM -   PM -   HS -    Other Notes/ Initial Assessment:    The patient was identified by name and date of birth. Ruthy was informed that this is a telemedicine visit and that the " "visit is being conducted through [employer] [facility]. She agrees to proceed..  My office door was closed. No one else was in the room.  She acknowledged consent and understanding of privacy and security of the video platform. The patient has agreed to participate and understands they can discontinue the visit at any time. Patient is aware this is a billable service.       09/06/24  Appointment started late, pt thought it was at 12:30 when it was in fact @ 11:30 am. This writer was able to accommodate for this today.  Pt had an appointment 09/24 but wished to have one sooner to talk through some issues.    Pt has been trying to do something other than eggs 3x/week such as oatmeal or yogurt but hasn't been doing well.  States she is now repulsed by egg whites, now has 2 regular eggs.  Trying to eat a vegetable-raw or cooked with every meal so sometimes has thiem with breakfast +  whole grain toast.  Pt knows though not wanting to increase her cholesterol meals not eating eggs every day/so often. Discussed some tips including trying leftovers, maybe homemade pancakes and she can put in different mix ins such as fruit/nuts-discussed portions for high kcal foods like nuts.    Has been trying to reduce eating sweets but only eating them after dinner but lately has been getting more cravings for sweets around 1-3 pm.  Pt has been going to bed and getting up earlier, now has a more balanced lunch. Suggested pt try to have a fruit or a \"mock\" dessert with or after lunch to see if htat fills the craving for something sweet.  Pt is open to this but admits to struggling with thinking of ideas and sometimes motivation. Suggested looking at the apps [employer] or Evi for some ideas--today suggested energy bites, homemade granola bars, fruit dippend in a little melted chocolate, topping fruit with cool-whip or whippped cream, ricecakes with a little peanut aidee or nutella and fruit.     Pt feels she is doing better with water " "but struggles remembering-thinks she will get a large water bottle and fill it with what she should get in a day (discussed estimated needs). Pt is staying consistent with going ot the gym 3 days/week. Pt asked about when she should get her next lipid panel. Last one was 04/2024, discussed possibly 6 mo later so 10/2024. This writer reached out to pt's PCP who is on board with this.     F/U scheduled 10/15/24-Lifestyle nutrition appointment.     ___________________________________________________________________________________________________________________  08/06/24    Pt has met goal #1 for breakfast, also switched to low-fat cheese instead of full fat cheese.  As for goal #2 Pt has started to make a salad with red/white cabbage, other vegetables, beans--likes the way that this gets more vegetables and fiber, more balanced meals. She has been getting more interested and motivated to make meals where as she wasn't as much before.   Pt also states it makes her feel good about herself that she is meeting her goals.  When discussed continuing the goals or making new ones. This writer gave pt the ok if she wants to stay with the same goals if she feels she still has to work at meeting them... until they become more of a second nature habit. Pt stated she would like to do this also in line with helping her to find balance vs needing to do \"all of the things\" all at once.       Trying to limit sweets to 1 sweet treat at the end of the day and make some other changes but then felt overwhelmed and triggered (r/t disordered eating) so decided to go back to what had been discussed with sticking with only working on our previously set goals.    Pt has been consistent with going to the gym 3 days a week and proud of herself for sticking with it.  Pt also went to a restorative yoga class and liked it, felt it helped with mindfulness, this class is offered ~ once a month as far as pt knowsn.     Pt is working on figuring gout " her hunger and full cues. Feels a hard time deciding if she wants to eat something because is hungry for it, if she is craving it, reviewed additional tips for mindfulness. Also suggested trying to have something healthier but sweet like fruit, low-fat yogurt either before eating her sweet treat or after.     Pt states since she is going to thr gym she is daydreaming less and has less need for the trampoline. She states she is also starting to look for a job.     F/U 09/24/24  ____________________________________________________________________________________________________________________________________________  07/08/24  Pt lives with wife who is a vegetarian and they have different tastes in food. Main proteins are chicken  Pt had an eating disorder from ~12 yo on and off until her 2nd year of college. Pt notes recent labs, especially lipid panel was high.  Pt feels like she has a good relationship with her body and her relationship with food but does want to slow wt gain and improve her labs but not falling back to disordered eating habits.  Pt's triglycerides increased over 100 from 10/23-04/24 hx an increase in ~20 for her cholesterol in the same time frame. Pt with hx of DM and the last time her A1c was checked was 05/2023. This writer reached out to pt's referring dr about getting an order to have A1c checked-pt is also agreeable to this.         Hx of eating disorders: started around 13 years old. She and her friend started to focus on wanting to lose wt. States on 15th or 16th birthday she remembers being on a diet where she mainly ate buckwheat and then would binge eat the next week, buckwheat one week, binge the next. Around that time she realized she started missing her period and it was a shock to her of not wanting to mess with her health. She started to eat more balanced eating and incorporate more fats and her period came back. From ~ 16 years old until 2nd year of college there was some  restriction, negative body image, felt there were a lot of foods she couldn't eat. Pt used to weigh herself daily but then threw away her scale and now only gets wt checked at Dr's appointments.  Did not get wt today, will consider at future in person appointment.     Pt then started to focus less on eating after her 2nd year of college but then noticed her wt was creeping up when she would get weighed at the drs office. Pt notes during that time she would daydream and often jumping a lot. Pt tried to work on day dreaming less d/t feeling it is embarrassing and then also stopped jumping-feels like that was a good source of activity.  Pt still sometimes thinks about daydreaming and they got a trampoline but states she has to be in the mindset to daydream and jump so at this time.    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 Pt was working but was recently laid off. Pt plans to start looking for work again after the summer. Pt's wife is currently working. Pt is home during the day.  Pt started working out to decompress after work. Goal is to work out to be healthy but still has the mindset of hyper fixating on burning more calories.  Pt feels in college she started to read more about body positivity and feminism and that was helpful for her mindset.     Fluids: since getting her labs back she is drinking 1-2 of her ~24-28 oz cup of water per day.  When its not that hot she will drink decaf black tea (1-4c/day), 1c seltzer per day.  Pt also notes eating more processed sugar snacks prior to her labs coming in. Now has been snacking on more fruit-often berries (fresh or frozen), or raw vegetables. Later on in the evening pt then will often eat more sweet things.     Pt states she isnt sure if she likes structure or if she doesn't. Right now she likes the idea of not having structure for most of her day but is ok with the idea of structuring some of her meals and feels that would be helpful. She would like to have some guidance on  "reducing foods that may be higher in cholesterol as well as increasing her vegetables. She would also like some guidance on balancing meals.  Sometime for later on may be \"decriminalizing\" sweets/desserts as well as finding some healthier alteratives for sweet foods.  Pt feels she is the type of person to have a larger portion of her favorite dessert less often vs having a smaller portion more often.     Follow up 08/06/24 Virtual, use email---ask about purging. If appointment is on a Tues/Thurs needs to be virtual d/t wife having the car, can come in person Monday or Friday.        Nutrition Diagnosis:   Altered Nutrition-Related Laboratory values  related to hyper triglyceridemia as evidenced by  elevated triglycerides.       Any change or new dx since previous visit:     Nutrition Diagnosis:         Medical Nutrition Therapy Intervention:  [x]Individualized Meal Plan: did not focus on calories/macro [x]Understanding Lab Values: lipid panel, BG, A1c   []Basic Pathophysiology of Disease []Food/Medication Interactions   []Food Diary [x]Exercise: recommend 150 min/week   [x]Lifestyle/Behavior Modification Techniques: finding balance, HAES, reducing cholesterol, increasing vegetables []Medication, Mechanism of Action   []Label Reading: CHO/ Na/ Fat/ other_________ []Self Blood Glucose Monitoring   []Weight/BMI Goals: gain/lose/maintain N/A []Other -           Comprehension: []Excellent  [x]Very Good  []Good  []Fair   []Poor    Receptivity: []Excellent  [x]Very Good  []Good  []Fair   []Poor    Expected Compliance: []Excellent  [x]Very Good  []Good  []Fair   []Poor        Goals: 09/06/24--  Every other time having eggs for breakfast make the following changeS: 3-4 egg whites (no whole eggs), add vegetables, substitute 1-2 pieces of higher fiber breakfast for breakfast meat   2. On the same says as having the altered egg white breakfast, for dinner: 1c starch/starchy vegetable, 1+ c non-starchy vegetable, 5-6oz lean " "protein   3.       Goals: 08/06/24--  Every other time having eggs for breakfast make the following changeS: 3-4 egg whites (no whole eggs), add vegetables, substitute 1-2 pieces of higher fiber breakfast for breakfast meat   2. On the same says as having the altered egg white breakfast, for dinner: 1c starch/starchy vegetable, 1+ c non-starchy vegetable, 5-6oz lean protein   3.     Goals (initial): 07/08/24  Every other time having eggs for breakfast make the following changeS: 3-4 egg whites (no whole eggs), add vegetables, substitute 1-2 pieces of higher fiber breakfast for breakfast meat   2. On the same says as having the altered egg white breakfast, for dinner: 1c starch/starchy vegetable, 1+ c non-starchy vegetable, 5-6oz lean protein   3.     Labs:  CMP  Lab Results   Component Value Date    K 4.4 04/22/2024     04/22/2024    CO2 28 04/22/2024    BUN 12 04/22/2024    CREATININE 0.75 04/22/2024    GLUF 84 04/22/2024    CALCIUM 9.0 04/22/2024    AST 20 04/22/2024    ALT 19 04/22/2024    ALKPHOS 98 04/22/2024    EGFR 112 04/22/2024       BMP  Lab Results   Component Value Date    CALCIUM 9.0 04/22/2024    K 4.4 04/22/2024    CO2 28 04/22/2024     04/22/2024    BUN 12 04/22/2024    CREATININE 0.75 04/22/2024       Lipids  No results found for: \"CHOL\"  Lab Results   Component Value Date    HDL 41 (L) 04/22/2024    HDL 43 (L) 10/21/2023     Lab Results   Component Value Date    LDLCALC 99 04/22/2024    LDLCALC 102 (H) 10/21/2023     Lab Results   Component Value Date    TRIG 231 (H) 04/22/2024    TRIG 112 10/21/2023     No results found for: \"CHOLHDL\"    Hemoglobin A1C  Lab Results   Component Value Date    HGBA1C 5.3 05/11/2023       Fasting Glucose  Lab Results   Component Value Date    GLUF 84 04/22/2024       Insulin     Thyroid  Lab Results   Component Value Date    TSH 1.01 10/15/2022       Hepatic Function Panel  Lab Results   Component Value Date    ALT 19 04/22/2024    AST 20 04/22/2024    " "ALKPHOS 98 04/22/2024       Celiac Disease Antibody Panel  No results found for: \"ENDOMYSIAL IGA\", \"GLIADIN IGA\", \"GLIADIN IGG\", \"IGA\", \"TISSUE TRANSGLUT AB\", \"TTG IGA\"   Iron  Lab Results   Component Value Date    IRON 71 04/22/2024    TIBC 455 (H) 04/22/2024    FERRITIN 11 04/22/2024            Kymberly Patel RD LDN  [facility] CLINICAL NUTRITION SERVICES  [address] [place] [id number]    "

## 2024-09-10 ENCOUNTER — TELEPHONE (OUTPATIENT)
Dept: PSYCHIATRY | Facility: CLINIC | Age: 24
End: 2024-09-10

## 2024-09-10 ENCOUNTER — TELEPHONE (OUTPATIENT)
Age: 24
End: 2024-09-10

## 2024-09-10 DIAGNOSIS — F41.1 GENERALIZED ANXIETY DISORDER: ICD-10-CM

## 2024-09-10 DIAGNOSIS — F33.2 SEVERE EPISODE OF RECURRENT MAJOR DEPRESSIVE DISORDER, WITHOUT PSYCHOTIC FEATURES (HCC): ICD-10-CM

## 2024-09-10 DIAGNOSIS — F42.2 MIXED OBSESSIONAL THOUGHTS AND ACTS: ICD-10-CM

## 2024-09-10 RX ORDER — DULOXETIN HYDROCHLORIDE 20 MG/1
CAPSULE, DELAYED RELEASE ORAL
Qty: 90 CAPSULE | Refills: 3 | Status: SHIPPED | OUTPATIENT
Start: 2024-09-10 | End: 2024-09-11 | Stop reason: SDUPTHER

## 2024-09-10 NOTE — TELEPHONE ENCOUNTER
Patient called because she went to the pharmacy to  her cymbalta, and patient stated provider increased dosage to 60mg a day and the pharmacy needs a new prescription reflecting that

## 2024-09-10 NOTE — TELEPHONE ENCOUNTER
PA for DULoxetine 20 mg capsule SUBMITTED     via    [][facility]-KEY:   [x]Sammie-Case ID # [id number]   []Faxed to plan   []Other website   []Phone call Case ID #     Office notes sent, clinical questions answered. Awaiting determination    Turnaround time for your insurance to make a decision on your Prior Authorization can take 7-21 business days.

## 2024-09-10 NOTE — TELEPHONE ENCOUNTER
Prior Authorization Has Been Started Duloxetine HCl 20 mg     Key:  [id number]  Last Name:  LUCINA  :  2000    Scanned into Media

## 2024-09-10 NOTE — TELEPHONE ENCOUNTER
Attending Physician Attestation Note:    Resident Physician: Tiffany Del Angel DO    Pt is a 69 year old female here for the following concerns:  Chief Complaint   Patient presents with   • Arm     omt left arm     Visit Vitals  /84 (BP Location: E, Patient Position: Sitting, Cuff Size: Large Adult)   Pulse 88   Temp 98.2 °F (36.8 °C) (Oral)   Resp 14   Ht 5' 6.5\" (1.689 m)   Wt 65.9 kg   BMI 23.08 kg/m²       Impression and Plan:  Hx of severe anxiety and depression  Saw a therapist in the past   Feeling isolated   Discussed types of therapy available   Has tried many antidepressants in the past with side effects     Will try low dose luvox   Consider acupuncture     No OMT done today    Med Rec: done     The patient was seen by me as well and I was present during key portions of the procedure if one was performed.     I agree with the history, exam and plan as noted by the resident physician.    Please see resident note for details.      Anita Guy MD  4/15/2019               Called Ruthy and informed her that updated script was sent to the pharmacy.

## 2024-09-11 DIAGNOSIS — F41.1 GENERALIZED ANXIETY DISORDER: ICD-10-CM

## 2024-09-11 DIAGNOSIS — F42.2 MIXED OBSESSIONAL THOUGHTS AND ACTS: ICD-10-CM

## 2024-09-11 DIAGNOSIS — F33.2 SEVERE EPISODE OF RECURRENT MAJOR DEPRESSIVE DISORDER, WITHOUT PSYCHOTIC FEATURES (HCC): ICD-10-CM

## 2024-09-11 RX ORDER — DULOXETIN HYDROCHLORIDE 20 MG/1
CAPSULE, DELAYED RELEASE ORAL
Qty: 60 CAPSULE | Refills: 3 | Status: SHIPPED | OUTPATIENT
Start: 2024-09-11

## 2024-09-11 NOTE — TELEPHONE ENCOUNTER
Patient called in to check the status of the prior authorization for their new does of Duloxetine.    Writer stated the prior authorization for the 60mg total has been submitted.    Patient states they will need a refill of their medication before they leave for [place] in the morning and states they will be out of their medication if it isn't refilled by tonight (9/11/24) Patient is requesting a call back because they would like to know what steps they should take to get their medication and what to do if they cannot.    The best contact number for the patient is [phone]

## 2024-09-12 ENCOUNTER — TELEPHONE (OUTPATIENT)
Dept: PSYCHIATRY | Facility: CLINIC | Age: 24
End: 2024-09-12

## 2024-09-13 NOTE — TELEPHONE ENCOUNTER
Duplicate encounter created, please see telephone encounter from 9/10/2024 regarding DULoxetine (CYMBALTA) 20 mg capsule  PA status.   (PA has been approved)   Please review patient's chart to see if there is already an encounter regarding the medication in question and to document anything regarding this medication in regards to anything regarding the authorization process etc before creating another encounter Thank You.

## 2024-09-19 ENCOUNTER — TELEPHONE (OUTPATIENT)
Dept: NEUROLOGY | Facility: CLINIC | Age: 24
End: 2024-09-19

## 2024-09-21 NOTE — PATIENT INSTRUCTIONS
Patient Instructions:    Additional Testing  -I am not recommending further Neurodiagnostic workup at this time    Headache/migraine treatment:     Prevention  -To take every day to help prevent headaches - not to take at the time of headache:  -Continue Emgality monthly injectable  -Over the counter preventive supplements for headaches/migraines (if you try, try for 3 months straight):  -Magnesium 400mg daily (If any diarrhea or upset stomach, decrease dose as tolerated)  -Riboflavin (Vitamin B2) 400mg daily (may make your urine bright/neon yellow)  - All supplements can be purchased online    Abortive  -For immediate treatment of a headache/migraine  -For your more moderate to severe migraines take this medication as early as possible:  -Continue Excedrin for now since it's effective. May consider Imitrex in future if it becomes less effective.    -It is ok to take ibuprofen, acetaminophen or naproxen (Advil, Tylenol,  Aleve, Excedrin) if they help your headaches you should limit these to No more than 3 times a week to avoid medication overuse/rebound headaches.     Lifestyle Recommendations:    -Remain well-hydrated drinking at least 48 to 64 ounces of noncaffeinated beverages per day in addition to anything caffeinated.    -Getting adequate rest is also very important for migraine prevention (aim for 7-8 hours per night).     -Regular exercise is also beneficial for headache prevention.  I would encourage at the least 5 days of physical exercise weekly for at least 30 minutes.   -I would like for them to keep track of their migraines using an application on their phone or calendar as they see fit. Phone applications: Migraine [place] or Migraine Diary.    Education and Follow-up:    -Please call or send a [employer] message with any questions or concerns. Please present to the emergency room with any concerning symptoms such as: worst headache of your life, sudden painless loss of vision or double vision, difficulty  speaking or swallowing, vertigo/room spinning that does not quickly resolve, or weakness/numbness/loss of coordination affecting 1 side of the face or body.  -Follow up in 6 months or sooner if needed.

## 2024-09-21 NOTE — PROGRESS NOTES
[facility]  PATIENT:  Ruthy Farley  MRN:  [id number]  :  2000  DATE OF SERVICE:  2024  REFERRED BY: No ref. provider found  PCP: Teresa Montana DO    Assessment/Plan:     No problem-specific Assessment & Plan notes found for this encounter.       There are no diagnoses linked to this encounter.       Patient should follow up in 2 months, or I would be happy to see her sooner if needed.  Patient should call the office or send a [facility] message with any questions or concerns.  Patient should present to the nearest emergency department with any concerning symptoms.     CC:     We had the pleasure of evaluating Ruthy in neurological follow-up today. she is a 24 y.o. year-old female who presents today for evaluation of headaches.     History obtained from patient as well as available medical record review.    History of Present Illness:     Consult with me 24:She reports that her migraine headache started around the age of 12.  Recently, they have started worsening in frequency and intensity without any trauma or event.  She is currently experiencing approximately 3 migraine headaches per week although she tracks her migraines on an anny and she has been having them much more frequently in July.  She sees her family doctor and they have tried amitriptyline and added on propranolol both of which have been ineffective for her migraines.  She uses Excedrin to alleviate her headaches and this is effective for her however she reports taking this medication more than 3 times in a week.    Interval history as of 24:  Qulipta was not covered by her insurance, so we started emgality.  She was able to do 2 injections so far and denies any side effects.  She keeps a migraine diary and in the month of August, she experienced 5 headaches, only 4 of which she took excedrin for.  In sept only 1 so far.  She is happy with the Emgality.  She has only been using the Excedrin  as needed, so no concern for medication overuse at this period of time.  May consider a prescriptive abortive medication if needed in the future.    Headaches started at what age?  12 years old  How often do the headaches occur?  Last visit approximately 3x per week.  Now, 1 in the month of sept and 5 in the month of August.   What time of the day do the headaches start?  Tend to be later in the day but most of her life she would wake up with the headaches.   How long do the headaches last? Hours to a full day  Are you ever headache free? yes    Aura? without aura     Last eye exam: None recently.     Where is your headache located and pain quality? Right temple area throbbing occasionally stabbing pain through her right temple every couple of weeks and it lasts seconds.   What is the intensity of pain? 6/10  Associated symptoms:   [] Nausea       [] Vomiting        [] Diarrhea  [x] Insomnia    [] Stiff or sore neck   [x] Problems with concentration  [] Photophobia     []Phonophobia      [] Osmophobia  [] Blurred vision   [] Prefer quiet, dark room  [] Light-headed or dizzy     [] Tinnitus   [] Hands or feet tingle or feel numb/paresthesias    [] Ptosis      [] Facial droop  [] Lacrimation  [] Nasal congestion/rhinorrhea   [] Flushing of face      Things that make the headache worse? No specific movement    Headache triggers:  stress, lack of sleep, menstruation    Have you seen someone else for headaches or pain? Yes, PCP  Have you had trigger point injection performed and how often? No  Have you had Botox injection performed and how often? No   Have you had epidural injections or transforaminal injections performed? No  Are you current pregnant or planning on getting pregnant? No, has same sex partner  Have you ever had any Brain imaging? no    LIFESTYLE  Sleep   Averages: 8-9 hours per night  Problems falling asleep?:   No  Problems staying asleep?:  No  Do you snore while asleep?Yes, had sleep apnea testing and  it was negative for MATTHEW  Physical activity: gym 3x per week  Water: 48oz per day  Caffeine: 1 cup 3x per week   Mood: stable mood per patient    The following portions of the patient's history were reviewed and updated as appropriate: allergies, current medications, past family history, past medical history, past social history, past surgical history and problem list.    Pertinent family history:  Family history of headaches:  no known family members with significant headaches  Any family history of aneurysms - No    Pertinent social history:  Work: was laid off in may  Lives with lives with their spouse    Illicit Drugs: denies  Alcohol/tobacco: no smoking. Social alcohol but rarely 1x/month    What medications do you take or have you taken for your headaches?:    ABORTIVE:    OTC medications: Excedrin (effective)  Prescription: none  Past/failed/contraindicated: ibuprofen (no longer effective)    PREVENTIVE:   OTC medications: calcium-mag-zinc.   Prescription: Qulipta (not covered by insurance) Emgality?  Medications from other providers: Cymbalta  Past/failed/contraindicated: nortriptyline (ineffective), Propranolol, amitriptyline (ineffective)    Alternative therapies used in the past for headaches?   OMT    Past Medical History:     Past Medical History:   Diagnosis Date    Anxiety     Depression     Obsessive-compulsive disorder     Sleep difficulties      Past Surgical History:   Procedure Laterality Date    DENTAL SURGERY       Patient Active Problem List   Diagnosis    Encounter for screening for HIV    Rash    BMI 40.0-44.9, adult (HCC)    Inflamed skin tag    Chronic nonintractable headache    At risk for sleep apnea    Current severe episode of major depressive disorder without psychotic features (HCC)    Generalized anxiety disorder    Mixed obsessional thoughts and acts    Vitamin B12 deficiency    Hypertriglyceridemia    Chronic migraine without aura without status migrainosus, not intractable     Vitamin D deficiency    Ingrown nail of great toe    MATTHEW (obstructive sleep apnea)    Snoring     Medications:     Current Outpatient Medications   Medication Sig Dispense Refill    DULoxetine (CYMBALTA) 20 mg capsule Take one capsule (20 mg) BID po daily 60 capsule 3    ergocalciferol (VITAMIN D2) 50,000 units TAKE 1 CAPSULE BY MOUTH ONCE A WEEK 12 capsule 0    Galcanezumab-gnlm 120 MG/ML SOAJ Inject 1 mL under the skin every 30 (thirty) days Begin 30 days after loading dose 1 mL 11    Sodium Fluoride 5000 PPM 1.1 % PSTE BRUSH THOROUGHLY ONCE DAILY PREFERABLY AT BEDTIME FOR 2 MINUTES, EXPECTORATE AFTER USE, DO NOT RINSE.      vitamin B-12 (VITAMIN B-12) 1,000 mcg tablet Take 1 tablet (1,000 mcg total) by mouth daily 30 tablet 0     No current facility-administered medications for this visit.        Allergies:     No Known Allergies    Family History:     Family History   Problem Relation Age of Onset    Diabetes Father     No Known Problems Brother     Breast cancer Neg Hx     Colon cancer Neg Hx     Ovarian cancer Neg Hx        Social History:     Social History     Socioeconomic History    Marital status: Single     Spouse name: Not on file    Number of children: Not on file    Years of education: Not on file    Highest education level: Not on file   Occupational History    Occupation: Healthcare Coordinator   Tobacco Use    Smoking status: Former     Current packs/day: 0.00     Types: Cigarettes     Quit date: 2022     Years since quittin.6    Smokeless tobacco: Never   Vaping Use    Vaping status: Never Used   Substance and Sexual Activity    Alcohol use: Yes     Comment: socially    Drug use: Not Currently    Sexual activity: Yes     Partners: Female     Birth control/protection: None   Other Topics Concern    Not on file   Social History Narrative    Not on file     Social Determinants of Health     Financial Resource Strain: Low Risk  (2022)    Overall Financial Resource Strain (CARDIA)      Difficulty of Paying Living Expenses: Not hard at all   Food Insecurity: No Food Insecurity (12/5/2022)    Hunger Vital Sign     Worried About Running Out of Food in the Last Year: Never true     Ran Out of Food in the Last Year: Never true   Transportation Needs: No Transportation Needs (12/5/2022)    PRAPARE - Transportation     Lack of Transportation (Medical): No     Lack of Transportation (Non-Medical): No   Physical Activity: Not on file   Stress: No Stress Concern Present (12/5/2022)    [employer] - Occupational Stress Questionnaire     Feeling of Stress : Not at all   Social Connections: Moderately Isolated (10/4/2022)    Received from [facility], [facility]    Social Connection and Isolation Panel [NHANES]     Frequency of Communication with Friends and Family: Three times a week     Frequency of Social Gatherings with Friends and Family: More than three times a week     Attends Sikhism Services: Never     Active Member of Clubs or Organizations: No     Attends Club or Organization Meetings: Never     Marital Status:    Intimate Partner Violence: Not At Risk (12/5/2022)    Humiliation, Afraid, Rape, and Kick questionnaire     Fear of Current or Ex-Partner: No     Emotionally Abused: No     Physically Abused: No     Sexually Abused: No   Housing Stability: Low Risk  (12/5/2022)    Housing Stability Vital Sign     Unable to Pay for Housing in the Last Year: No     Number of Places Lived in the Last Year: 2     Unstable Housing in the Last Year: No       Objective:     Physical Exam:    Vitals:  There were no vitals taken for this visit.  BP Readings from Last 3 Encounters:   07/22/24 108/60   06/26/24 120/76   05/13/24 110/72     Pulse Readings from Last 3 Encounters:   07/22/24 75   06/26/24 92   05/13/24 86     On neurological examination the patient was awake, alert, attentive, oriented to person, place, and time. Recent and remote  memory intact to conversation with no evidence of language dysfunction. Satisfactory fund of knowledge. Normal attention span and concentration.  Mood, affect and judgement are appropriate. Speech is fluent without dysarthria or aphasia. Face appears symmetric, with no obvious weakness noted.  Audition is intact to casual conversation.  Eye movements are intact.  Able to move bilateral upper extremities antigravity without difficulty.      Pertinent lab results:   Lab Results   Component Value Date    WBC 11.74 (H) 2024    HGB 12.7 2024    HCT 41.3 2024    MCV 82 2024     2024     Lab Results   Component Value Date    SODIUM 136 2024    K 4.4 2024     2024    CO2 28 2024    AGAP 6 2024    BUN 12 2024    CREATININE 0.75 2024    GLUC 86 2023    GLUF 84 2024    CALCIUM 9.0 2024    AST 20 2024    ALT 19 2024    ALKPHOS 98 2024    TP 7.4 2024    TBILI 0.35 2024    EGFR 112 2024     Lab Results   Component Value Date    MBZ1XJWZTJMG 1.752 2024    TSH 1.01 10/15/2022     Lab Results   Component Value Date    GEUHENAA01 347 2024      Lab Results   Component Value Date    IRON 71 2024    TIBC 455 (H) 2024    FERRITIN 11 2024     Pertinent Imagin2024 MRI brain: No acute infarction, intracranial hemorrhage or mass effect.     Review of Systems:     Constitutional:  Negative for appetite change, fatigue and fever.   HENT: Negative.  Negative for hearing loss, tinnitus, trouble swallowing and voice change.    Eyes: Negative.  Negative for photophobia, pain and visual disturbance.   Respiratory: Negative.  Negative for shortness of breath.    Cardiovascular: Negative.  Negative for palpitations.   Gastrointestinal: Negative.  Negative for nausea and vomiting.   Endocrine: Negative.  Negative for cold intolerance.   Genitourinary: Negative.  Negative for  dysuria, frequency and urgency.   Musculoskeletal:  Negative for back pain, gait problem, myalgias, neck pain and neck stiffness.   Skin: Negative.  Negative for rash.   Allergic/Immunologic: Negative.    Neurological:  Positive for headaches (emgality is helping). Negative for dizziness, tremors, seizures, syncope, facial asymmetry, speech difficulty, weakness, light-headedness and numbness.   Hematological: Negative.  Does not bruise/bleed easily.   Psychiatric/Behavioral: Negative.  Negative for confusion, hallucinations and sleep disturbance.      Reviewed ROS as entered by medical assistant.     I have spent a total time of 20 minutes on 09/21/24 in caring for this patient including Diagnostic results, Prognosis, Risks and benefits of tx options, Instructions for management, Patient and family education, Importance of tx compliance, Risk factor reductions, Impressions, Counseling / Coordination of care, Documenting in the medical record, Reviewing / ordering tests, medicine, procedures  , and Obtaining or reviewing history  .       Author:  SANDEE Toro 9/21/2024 3:58 PM

## 2024-09-23 ENCOUNTER — OFFICE VISIT (OUTPATIENT)
Dept: NEUROLOGY | Facility: CLINIC | Age: 24
End: 2024-09-23
Payer: COMMERCIAL

## 2024-09-23 ENCOUNTER — ANNUAL EXAM (OUTPATIENT)
Dept: OBGYN CLINIC | Facility: CLINIC | Age: 24
End: 2024-09-23
Payer: COMMERCIAL

## 2024-09-23 VITALS
BODY MASS INDEX: 42.17 KG/M2 | DIASTOLIC BLOOD PRESSURE: 70 MMHG | OXYGEN SATURATION: 98 % | HEART RATE: 69 BPM | WEIGHT: 238 LBS | SYSTOLIC BLOOD PRESSURE: 116 MMHG | HEIGHT: 63 IN

## 2024-09-23 VITALS
SYSTOLIC BLOOD PRESSURE: 112 MMHG | HEIGHT: 63 IN | DIASTOLIC BLOOD PRESSURE: 68 MMHG | BODY MASS INDEX: 42.28 KG/M2 | WEIGHT: 238.6 LBS

## 2024-09-23 DIAGNOSIS — G43.709 CHRONIC MIGRAINE WITHOUT AURA WITHOUT STATUS MIGRAINOSUS, NOT INTRACTABLE: Primary | ICD-10-CM

## 2024-09-23 DIAGNOSIS — N76.3 CHRONIC VULVITIS: ICD-10-CM

## 2024-09-23 DIAGNOSIS — Z01.411 ENCOUNTER FOR GYNECOLOGICAL EXAMINATION WITH ABNORMAL FINDING: Primary | ICD-10-CM

## 2024-09-23 PROBLEM — Z11.4 ENCOUNTER FOR SCREENING FOR HIV: Status: RESOLVED | Noted: 2022-12-05 | Resolved: 2024-09-23

## 2024-09-23 PROCEDURE — G0145 SCR C/V CYTO,THINLAYER,RESCR: HCPCS | Performed by: PHYSICIAN ASSISTANT

## 2024-09-23 PROCEDURE — 99214 OFFICE O/P EST MOD 30 MIN: CPT

## 2024-09-23 PROCEDURE — S0612 ANNUAL GYNECOLOGICAL EXAMINA: HCPCS | Performed by: PHYSICIAN ASSISTANT

## 2024-09-23 PROCEDURE — 87510 GARDNER VAG DNA DIR PROBE: CPT | Performed by: PHYSICIAN ASSISTANT

## 2024-09-23 PROCEDURE — 87480 CANDIDA DNA DIR PROBE: CPT | Performed by: PHYSICIAN ASSISTANT

## 2024-09-23 PROCEDURE — 87660 TRICHOMONAS VAGIN DIR PROBE: CPT | Performed by: PHYSICIAN ASSISTANT

## 2024-09-23 NOTE — ASSESSMENT & PLAN NOTE
Qulipta was not covered by her insurance, so we started emgality.  She was able to do 2 injections so far and denies any side effects.  She keeps a migraine diary and in the month of August, she experienced 5 headaches, only 4 of which she took excedrin for.  In sept only 1 so far.  She is happy with the Emgality.  She has only been using the Excedrin as needed, so no concern for medication overuse at this period of time.  May consider a prescriptive abortive medication if needed in the future. She completed a brain MRI which came back without any structural abnormalities.   Workup:  No additional neuro imaging required at this juncture  Preventative:  We discussed headache hygiene and lifestyle factors that may improve headaches  Continue Emgality monthly injection  Past/ failed/contraindicated: amitriptyline, nortriptyline, and propranolol all ineffective  Future options:  Topamax, CGRP med, botox  Acute:  Discussed not taking over-the-counter or prescription pain medications more than 3 days per week to prevent medication overuse/rebound headache  Continue excedrin since this has been completely effective.  No more than 3x per week. We discussed Imitrex if needed in the future  Past/ failed/contraindicated: ibuprofen (no longer effective)  Future options:  Other triptan (Maxalt), ubrelvy, reyvow, nurtec     none

## 2024-09-23 NOTE — PROGRESS NOTES
"Assessment/Plan:      Diagnoses and all orders for this visit:    Encounter for gynecological examination with abnormal finding  -     Liquid-based pap, screening    Chronic vulvitis  -     VAGINOSIS DNA PROBE        Pap and vaginal cultures done.  We will call with culture results. Patient to call if periods worsen or change.  If cultures negative and itching continues, will need vulvar biopsy.  F/u to depend on results.    Subjective:     Patient ID: Ruthy Farley is a 24 y.o. female.    Patient is here for yearly gyn exam.  Seen with her wife present.  States she is doing well overall.  Periods are regular every 5-6 weeks, and bleeding lasts for 6-7 days.  Skipped a period in June.  Denies heavy bleeding, severe cramping, HA, and mood symptoms.  Continues to experience itching at the top of her vulva.  This has been a chronic issue for 2 years.  Denies bowel/bladder changes, pelvic pain, bloating, abdominal pain, n/v, change in appetite, and thyroid disease.    Patient is performing self-breast exam.  Denies new masses, skin changes, nipple discharge, and pain/tenderness.      Review of Systems   Constitutional:  Negative for appetite change and unexpected weight change.   Cardiovascular:         No masses, skin changes, nipple discharge, and pain/tenderness.   Gastrointestinal:  Negative for abdominal distention, abdominal pain, constipation, diarrhea, nausea and vomiting.   Genitourinary:  Negative for difficulty urinating, dysuria, frequency, genital sores, hematuria, menstrual problem, pelvic pain, urgency, vaginal bleeding, vaginal discharge and vaginal pain.        Vulvar itching         Objective:  Visit Vitals  /68 (BP Location: Left arm, Patient Position: Sitting, Cuff Size: Adult)   Ht 5' 3\" (1.6 m)   Wt 108 kg (238 lb 9.6 oz)   LMP 09/15/2024 (Exact Date)   BMI 42.27 kg/m²   OB Status Having periods   Smoking Status Former   BSA 2.08 m²         Physical Exam  Vitals reviewed. Exam " conducted with a chaperone present.   Constitutional:       Appearance: Normal appearance. She is well-developed. She is obese.   Neck:      Thyroid: No thyromegaly.   Pulmonary:      Effort: Pulmonary effort is normal.   Chest:   Breasts:     Breasts are symmetrical.      Right: Normal. No swelling, bleeding, inverted nipple, mass, nipple discharge, skin change or tenderness.      Left: Normal. No swelling, bleeding, inverted nipple, mass, nipple discharge, skin change or tenderness.   Abdominal:      General: There is no distension.      Palpations: Abdomen is soft.      Tenderness: There is no abdominal tenderness.   Genitourinary:     General: Normal vulva.      Pubic Area: No rash.       Labia:         Right: No rash, tenderness, lesion or injury.         Left: No rash, tenderness, lesion or injury.       Vagina: Normal. No vaginal discharge, erythema, tenderness or bleeding.      Cervix: Normal.      Uterus: Normal.       Adnexa: Right adnexa normal and left adnexa normal.        Right: No mass, tenderness or fullness.          Left: No mass, tenderness or fullness.     Musculoskeletal:      Cervical back: Neck supple.   Lymphadenopathy:      Cervical: No cervical adenopathy.      Upper Body:      Right upper body: No supraclavicular or axillary adenopathy.      Left upper body: No supraclavicular or axillary adenopathy.      Lower Body: No right inguinal adenopathy. No left inguinal adenopathy.   Skin:     General: Skin is warm and dry.   Neurological:      Mental Status: She is alert and oriented to person, place, and time.   Psychiatric:         Behavior: Behavior normal. Behavior is cooperative.         Thought Content: Thought content normal.         Judgment: Judgment normal.

## 2024-09-23 NOTE — PROGRESS NOTES
Review of Systems   Constitutional:  Negative for appetite change, fatigue and fever.   HENT: Negative.  Negative for hearing loss, tinnitus, trouble swallowing and voice change.    Eyes: Negative.  Negative for photophobia, pain and visual disturbance.   Respiratory: Negative.  Negative for shortness of breath.    Cardiovascular: Negative.  Negative for palpitations.   Gastrointestinal: Negative.  Negative for nausea and vomiting.   Endocrine: Negative.  Negative for cold intolerance.   Genitourinary: Negative.  Negative for dysuria, frequency and urgency.   Musculoskeletal:  Negative for back pain, gait problem, myalgias, neck pain and neck stiffness.   Skin: Negative.  Negative for rash.   Allergic/Immunologic: Negative.    Neurological:  Positive for headaches (emgality is helping). Negative for dizziness, tremors, seizures, syncope, facial asymmetry, speech difficulty, weakness, light-headedness and numbness.   Hematological: Negative.  Does not bruise/bleed easily.   Psychiatric/Behavioral: Negative.  Negative for confusion, hallucinations and sleep disturbance.

## 2024-09-24 LAB
CANDIDA RRNA VAG QL PROBE: NOT DETECTED
G VAGINALIS RRNA GENITAL QL PROBE: DETECTED
T VAGINALIS RRNA GENITAL QL PROBE: NOT DETECTED

## 2024-09-25 ENCOUNTER — TELEPHONE (OUTPATIENT)
Dept: OBGYN CLINIC | Facility: CLINIC | Age: 24
End: 2024-09-25

## 2024-09-25 DIAGNOSIS — N76.0 BV (BACTERIAL VAGINOSIS): Primary | ICD-10-CM

## 2024-09-25 DIAGNOSIS — B96.89 BV (BACTERIAL VAGINOSIS): Primary | ICD-10-CM

## 2024-09-25 RX ORDER — CLINDAMYCIN PHOSPHATE 20 MG/G
1 CREAM VAGINAL
Qty: 40 G | Refills: 0 | Status: SHIPPED | OUTPATIENT
Start: 2024-09-25 | End: 2024-10-02

## 2024-09-25 NOTE — TELEPHONE ENCOUNTER
Spoke with patient regarding vaginal culture results - positive for BV.  Rx for Cleocin 2% vaginal cream daily x 7 days sent to pharmacy.  Call if symptoms do not resolve.

## 2024-09-26 ENCOUNTER — TELEMEDICINE (OUTPATIENT)
Dept: PSYCHIATRY | Facility: CLINIC | Age: 24
End: 2024-09-26
Payer: COMMERCIAL

## 2024-09-26 DIAGNOSIS — F42.2 MIXED OBSESSIONAL THOUGHTS AND ACTS: ICD-10-CM

## 2024-09-26 DIAGNOSIS — F33.2 SEVERE EPISODE OF RECURRENT MAJOR DEPRESSIVE DISORDER, WITHOUT PSYCHOTIC FEATURES (HCC): ICD-10-CM

## 2024-09-26 DIAGNOSIS — F41.1 GENERALIZED ANXIETY DISORDER: ICD-10-CM

## 2024-09-26 PROCEDURE — 90833 PSYTX W PT W E/M 30 MIN: CPT | Performed by: NURSE PRACTITIONER

## 2024-09-26 PROCEDURE — 99214 OFFICE O/P EST MOD 30 MIN: CPT | Performed by: NURSE PRACTITIONER

## 2024-09-26 RX ORDER — DULOXETIN HYDROCHLORIDE 20 MG/1
20 CAPSULE, DELAYED RELEASE ORAL 3 TIMES DAILY
Qty: 90 CAPSULE | Refills: 3 | Status: SHIPPED | OUTPATIENT
Start: 2024-09-26

## 2024-09-26 RX ORDER — RISPERIDONE 0.25 MG/1
0.25 TABLET ORAL
Qty: 30 TABLET | Refills: 3 | Status: SHIPPED | OUTPATIENT
Start: 2024-09-26

## 2024-09-27 LAB
LAB AP GYN PRIMARY INTERPRETATION: NORMAL
Lab: NORMAL

## 2024-10-09 DIAGNOSIS — E55.9 VITAMIN D INSUFFICIENCY: ICD-10-CM

## 2024-10-09 RX ORDER — ERGOCALCIFEROL 1.25 MG/1
CAPSULE, LIQUID FILLED ORAL WEEKLY
Qty: 12 CAPSULE | Refills: 0 | Status: SHIPPED | OUTPATIENT
Start: 2024-10-09

## 2024-10-14 NOTE — ASSESSMENT & PLAN NOTE
"Ruthy reports increased OCD thoughts that \"she does not love her wife\".  Discussed intrusive thoughts and increase Cymbalta 60 mg and use risperidone in the evening.  She was instructed on side effects and use and support was provided.  Denies suicidal ideation or rituals.  "

## 2024-10-14 NOTE — ASSESSMENT & PLAN NOTE
Depression is improved denies suicidal ideation and states she is more motivated to do things, but worries it will not continue.  She is trying to go to the gym and start eating healthy.  Appetite is good and she is sleeping takes melatonin at night which is effective.

## 2024-10-14 NOTE — PATIENT INSTRUCTIONS
Continue medications  Call with problems or concerns  Continue risperidone use and increase Cymbalta to 60 mg Call with problems or concerns

## 2024-10-14 NOTE — BH TREATMENT PLAN
TREATMENT PLAN (Medication Management Only)         [facility]     Name and Date of Birth:  Ruthy Farley 24 y.o. 2000  Date of Treatment Plan: March 25, 2024, September 26, 2024  Diagnosis/Diagnoses:    1. Mixed obsessional thoughts and acts    2. Severe episode of recurrent major depressive disorder, without psychotic features (HCC)    3. Generalized anxiety disorder    4. Chronic nonintractable headache, unspecified headache type    5. High risk medication use       Strengths/Personal Resources for Self-Care: supportive family, taking medications as prescribed, ability to communicate needs, ability to listen.  Area/Areas of need (in own words): anxiety, depression, obsessive-compulsive symptoms  1.Long Term Goal: improve control of obsessive thoughts and compulsions  Target Date:6 months -3/26/2025  Person/Persons responsible for completion of goal: Ruthy, provider and therapist, family  2.         Short Term Objective (s) - How will we reach this goal?:   A. Provider new recommended medication/dosage changes and/or continue medication(s): continue current medications as prescribed.  B.  Therapy .  C.  Self care adequate sleep .  Target Date:6 months -3/26/2025  Person/Persons Responsible for Completion of Goal: Ruthy, provider and therapist, family  Progress Towards Goals: initiating treatment  Treatment Modality: medication management every 3 months  Review due 180 days from date of this plan: 6 months -3/26/2025  Expected length of service:  Ongoing  My Physician/PA/NP and I have developed this plan together and I agree to work on the goals and objectives. I understand the treatment goals that were developed for my treatment.

## 2024-10-14 NOTE — PSYCH
"Virtual Regular Visit    Problem List Items Addressed This Visit          Behavioral Health    Mixed obsessional thoughts and acts     Ruthy reports increased OCD thoughts that \"she does not love her wife\".  Discussed intrusive thoughts and increase Cymbalta 60 mg and use risperidone in the evening.  She was instructed on side effects and use and support was provided.  Denies suicidal ideation or rituals.         Relevant Medications    risperiDONE (RisperDAL) 0.25 mg tablet    Generalized anxiety disorder     Anxiety continues but is described as situational denies panic attack or anxiety attacks.  Looking for a job.         Relevant Medications    risperiDONE (RisperDAL) 0.25 mg tablet    DULoxetine (CYMBALTA) 20 mg capsule    Current severe episode of major depressive disorder without psychotic features (HCC)     Depression is improved denies suicidal ideation and states she is more motivated to do things, but worries it will not continue.  She is trying to go to the gym and start eating healthy.  Appetite is good and she is sleeping takes melatonin at night which is effective.         Relevant Medications    risperiDONE (RisperDAL) 0.25 mg tablet    DULoxetine (CYMBALTA) 20 mg capsule     Reason for visit is   Chief Complaint   Patient presents with    OCD    Anxiety    Depression    Medication Management     Encounter provider Marcia Gross, PhD    Provider located at [facility] [address]  [address]  [facility] [id number] [phone]    Recent Visits  No visits were found meeting these conditions.  Showing recent visits within past 7 days and meeting all other requirements  Future Appointments  No visits were found meeting these conditions.  Showing future appointments within next 150 days and meeting all other requirements       After connecting through [employer], the patient was identified by name and date of birth. Ruthy " Miguelito was informed that this is a telemedicine visit and that the visit is being conducted through the Epic Embedded platform. She agrees to proceed. which may not be secure and therefore, might not be HIPAA-compliant.  My office door was closed. No one else was in the room.  She acknowledged consent and understanding of privacy and security of the video platform. The patient has agreed to participate and understands they can discontinue the visit at any time.    SUBJECTIVE:    Rutyh Farley is a 24 y.o. female with a history of OCD, anxiety and depression seen for medication management and mood assessment.    HPI ROS Appetite Changes and Sleep: normal appetite and normal energy level    Review Of Systems:     Mood Anxiety and Depression   Behavior Normal    Thought Content Normal   General Relationship Problems and Emotional Problems   Personality Normal   Other Psych Symptoms Normal   Constitutional As Noted in HPI   ENT As Noted in HPI   Cardiovascular As Noted in HPI   Respiratory As Noted in HPI   Gastrointestinal As Noted in HPI   Genitourinary As Noted in HPI   Musculoskeletal As Noted in HPI   Integumentary As Noted in HPI   Neurological As Noted in HPI   Endocrine Normal    Other Symptoms Normal        Substance Abuse History:    Social History     Substance and Sexual Activity   Drug Use Not Currently       Family Psychiatric History:     Family History   Problem Relation Age of Onset    Diabetes Father     No Known Problems Brother     Breast cancer Neg Hx     Colon cancer Neg Hx     Ovarian cancer Neg Hx        Social History     Socioeconomic History    Marital status: Single     Spouse name: Not on file    Number of children: Not on file    Years of education: Not on file    Highest education level: Not on file   Occupational History    Occupation: Healthcare Coordinator   Tobacco Use    Smoking status: Former     Current packs/day: 0.00     Types: Cigarettes     Quit date: 02/2022      Years since quittin.7    Smokeless tobacco: Never   Vaping Use    Vaping status: Never Used   Substance and Sexual Activity    Alcohol use: Yes     Comment: socially    Drug use: Not Currently    Sexual activity: Yes     Partners: Female     Birth control/protection: None   Other Topics Concern    Not on file   Social History Narrative    Not on file     Social Determinants of Health     Financial Resource Strain: Low Risk  (2022)    Overall Financial Resource Strain (CARDIA)     Difficulty of Paying Living Expenses: Not hard at all   Food Insecurity: No Food Insecurity (2022)    Hunger Vital Sign     Worried About Running Out of Food in the Last Year: Never true     Ran Out of Food in the Last Year: Never true   Transportation Needs: No Transportation Needs (2022)    PRAPARE - Transportation     Lack of Transportation (Medical): No     Lack of Transportation (Non-Medical): No   Physical Activity: Not on file   Stress: No Stress Concern Present (2022)    [employer] - Occupational Stress Questionnaire     Feeling of Stress : Not at all   Social Connections: Moderately Isolated (10/4/2022)    [facility]    Social Connection and Isolation Panel [NHANES]     Frequency of Communication with Friends and Family: Three times a week     Frequency of Social Gatherings with Friends and Family: More than three times a week     Attends Roman [employer] Services: Never     Active Member of Clubs or Organizations: No     Attends Club or Organization Meetings: Never     Marital Status:    Intimate Partner Violence: Not At Risk (2022)    Humiliation, Afraid, Rape, and Kick questionnaire     Fear of Current or Ex-Partner: No     Emotionally Abused: No     Physically Abused: No     Sexually Abused: No   Housing Stability: Low Risk  (2022)    Housing Stability Vital Sign     Unable to Pay for Housing in the Last  Year: No     Number of Places Lived in the Last Year: 2     Unstable Housing in the Last Year: No       Past Medical History:   Diagnosis Date    Anxiety     Depression     Obsessive-compulsive disorder     Sleep difficulties        Past Surgical History:   Procedure Laterality Date    DENTAL SURGERY         Current Outpatient Medications   Medication Sig Dispense Refill    DULoxetine (CYMBALTA) 20 mg capsule Take 1 capsule (20 mg total) by mouth 3 (three) times a day 90 capsule 3    risperiDONE (RisperDAL) 0.25 mg tablet Take 1 tablet (0.25 mg total) by mouth daily at bedtime 30 tablet 3    CALCIUM PO Take by mouth Taking a Calcium/ magnesium and Zinc      ergocalciferol (VITAMIN D2) 50,000 units TAKE ONE TABLET BY MOUTH ONCE A WEEK 12 capsule 0    Galcanezumab-gnlm 120 MG/ML SOAJ Inject 1 mL under the skin every 30 (thirty) days Begin 30 days after loading dose 1 mL 11    Sodium Fluoride 5000 PPM 1.1 % PSTE BRUSH THOROUGHLY ONCE DAILY PREFERABLY AT BEDTIME FOR 2 MINUTES, EXPECTORATE AFTER USE, DO NOT RINSE.      vitamin B-12 (VITAMIN B-12) 1,000 mcg tablet Take 1 tablet (1,000 mcg total) by mouth daily 30 tablet 0     No current facility-administered medications for this visit.        No Known Allergies    The following portions of the patient's history were reviewed and updated as appropriate: allergies, current medications, past family history, past medical history, past social history, past surgical history, and problem list.    OBJECTIVE:     Mental Status Examination:    Appearance calm and cooperative , adequate hygiene and grooming, and good eye contact    Mood anxious   Affect affect appropriate    Speech a normal rate   Thought Processes normal thought processes   Hallucinations no hallucinations present    Thought Content no delusions   Abnormal Thoughts no suicidal thoughts  and no homicidal thoughts    Orientation  oriented to person and place and time   Remote Memory short term memory intact and long  "term memory intact   Attention Span concentration intact   Intellect Appears to be of Average Intelligence   Insight Insight intact   Judgement judgment was intact   Muscle Strength Muscle strength and tone were normal   Language no difficulty naming common objects   Fund of Knowledge displays adequate knowledge of current events   Pain none   Pain Scale 0       Laboratory Results: No results found for this or any previous visit.    Assessment/Plan:       Diagnoses and all orders for this visit:    Mixed obsessional thoughts and acts  -     risperiDONE (RisperDAL) 0.25 mg tablet; Take 1 tablet (0.25 mg total) by mouth daily at bedtime    Generalized anxiety disorder  -     DULoxetine (CYMBALTA) 20 mg capsule; Take 1 capsule (20 mg total) by mouth 3 (three) times a day    Severe episode of recurrent major depressive disorder, without psychotic features (HCC)  -     DULoxetine (CYMBALTA) 20 mg capsule; Take 1 capsule (20 mg total) by mouth 3 (three) times a day          Treatment Recommendations- Risks Benefits      Immediate Medical/Psychiatric/Psychotherapy Treatments and Any Precautions: Continue treatment plan Cymbalta 60 mg daily risperidone 0.25 mg at night    Risks, Benefits And Possible Side Effects Of Medications:  {PSYCH RISK, BENEFITS AND POSSIBLE SIDE EFFECTS (Optional):00465         Psychotherapy Provided: 30 minutes   supportive therapy  Medication evaluation  Mood assessment  Medication education   normalized and validated her feelings      Goals discussed in session: Stable mood reduce intrusive thoughts       Treatment Plan:    Completed and signed during the session: Yes - Treatment Plan done but not signed at time of office visit due to:  Plan reviewed by video and verbal consent given due to virtual visit. Treatment Plan sent to patient via [employer] for signature.    I spent 30 minutes minutes with the patient during this visit.  \"Portions of the record may have been created with voice recognition " "software. Occasional wrong word or \"sound a like\" substitutions may have occurred due to the inherent limitations of voice recognition software. Read the chart carefully and recognize, using context, where substitutions have occurred. Please call if you have any questions. \"    Marcia Gross, PhD 10/14/24  "

## 2024-10-14 NOTE — ASSESSMENT & PLAN NOTE
Anxiety continues but is described as situational denies panic attack or anxiety attacks.  Looking for a job.

## 2024-11-04 ENCOUNTER — APPOINTMENT (OUTPATIENT)
Dept: LAB | Facility: CLINIC | Age: 24
End: 2024-11-04
Payer: COMMERCIAL

## 2024-11-04 DIAGNOSIS — Z01.84 IMMUNITY STATUS TESTING: ICD-10-CM

## 2024-11-04 DIAGNOSIS — E78.1 HYPERTRIGLYCERIDEMIA: ICD-10-CM

## 2024-11-04 DIAGNOSIS — Z79.899 NEED FOR PROPHYLACTIC CHEMOTHERAPY: Primary | ICD-10-CM

## 2024-11-04 DIAGNOSIS — Z79.899 HIGH RISK MEDICATION USE: ICD-10-CM

## 2024-11-04 LAB
CHOLEST SERPL-MCNC: 171 MG/DL
HBV SURFACE AB SER-ACNC: 8.53 MIU/ML
HDLC SERPL-MCNC: 36 MG/DL
LDLC SERPL CALC-MCNC: 103 MG/DL (ref 0–100)
TRIGL SERPL-MCNC: 160 MG/DL
TSH SERPL DL<=0.05 MIU/L-ACNC: 1.27 UIU/ML (ref 0.45–4.5)
VZV IGG SER QL IA: NORMAL

## 2024-11-04 PROCEDURE — 86376 MICROSOMAL ANTIBODY EACH: CPT

## 2024-11-04 PROCEDURE — 80061 LIPID PANEL: CPT

## 2024-11-04 PROCEDURE — 86787 VARICELLA-ZOSTER ANTIBODY: CPT

## 2024-11-04 PROCEDURE — 86800 THYROGLOBULIN ANTIBODY: CPT

## 2024-11-04 PROCEDURE — 36415 COLL VENOUS BLD VENIPUNCTURE: CPT

## 2024-11-04 PROCEDURE — 84443 ASSAY THYROID STIM HORMONE: CPT

## 2024-11-04 PROCEDURE — 86762 RUBELLA ANTIBODY: CPT

## 2024-11-04 PROCEDURE — 86735 MUMPS ANTIBODY: CPT

## 2024-11-04 PROCEDURE — 86765 RUBEOLA ANTIBODY: CPT

## 2024-11-04 PROCEDURE — 86706 HEP B SURFACE ANTIBODY: CPT

## 2024-11-05 LAB
MEV IGG SER IA-ACNC: 57.4 AU/ML
MUV IGG SER IA-ACNC: 78.4 AU/ML
RUBV IGG SERPL IA-ACNC: 2.52 INDEX
THYROGLOB AB SERPL-ACNC: <1 IU/ML (ref 0–0.9)
THYROPEROXIDASE AB SERPL-ACNC: <9 IU/ML (ref 0–34)

## 2024-11-06 ENCOUNTER — TELEPHONE (OUTPATIENT)
Age: 24
End: 2024-11-06

## 2024-11-06 NOTE — TELEPHONE ENCOUNTER
Patient is calling regarding cancelling an appointment.    Date/Time: 11/7/2024 at 11:30 am    Reason: unable to make it    Patient was rescheduled: YES [x] NO []  If yes, when was Patient reschedule for: 11/12/2024 at 11:45 am    Patient requesting call back to reschedule: YES [] NO [x]

## 2024-11-12 ENCOUNTER — TELEMEDICINE (OUTPATIENT)
Dept: PSYCHIATRY | Facility: CLINIC | Age: 24
End: 2024-11-12
Payer: COMMERCIAL

## 2024-11-12 DIAGNOSIS — F41.1 GENERALIZED ANXIETY DISORDER: ICD-10-CM

## 2024-11-12 DIAGNOSIS — F33.2 SEVERE EPISODE OF RECURRENT MAJOR DEPRESSIVE DISORDER, WITHOUT PSYCHOTIC FEATURES (HCC): ICD-10-CM

## 2024-11-12 DIAGNOSIS — F42.2 MIXED OBSESSIONAL THOUGHTS AND ACTS: Primary | ICD-10-CM

## 2024-11-12 PROCEDURE — 90833 PSYTX W PT W E/M 30 MIN: CPT | Performed by: NURSE PRACTITIONER

## 2024-11-12 PROCEDURE — 99214 OFFICE O/P EST MOD 30 MIN: CPT | Performed by: NURSE PRACTITIONER

## 2024-11-12 RX ORDER — RISPERIDONE 0.5 MG/1
0.5 TABLET ORAL
Qty: 30 TABLET | Refills: 3 | Status: SHIPPED | OUTPATIENT
Start: 2024-11-12

## 2024-11-12 RX ORDER — RISPERIDONE 0.5 MG/1
0.5 TABLET ORAL 2 TIMES DAILY
Qty: 30 TABLET | Refills: 3 | Status: SHIPPED | OUTPATIENT
Start: 2024-11-12 | End: 2024-11-12 | Stop reason: DRUGHIGH

## 2024-11-24 NOTE — ASSESSMENT & PLAN NOTE
Reports continued occasional anxiety which she is able to cope with.  At recent presidential election is causing her concern.  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  Denies panic attacks.  Appetite and sleep patterns are reported as good

## 2024-11-24 NOTE — ASSESSMENT & PLAN NOTE
Cymbalta 20 mg 3 times a day  She reports depression is mild denies suicidal ideation.  Relationships are good and she is motivated.

## 2024-11-24 NOTE — ASSESSMENT & PLAN NOTE
Duloxetine 20 mg 3 times a day, increase risperidone 0.5 mg daily at bedtime  Ruthy reports she continues to have some obsessive thoughts regarding her life and relationships.  These thoughts cause her distress and we spoke about increasing Risperdal to decrease distorted thoughts.  She agreed reviewed side effects with her.  Takes medication as prescribed and wife is supportive.

## 2024-11-24 NOTE — PSYCH
Virtual Regular Visit    Problem List Items Addressed This Visit          Behavioral Health    Mixed obsessional thoughts and acts - Primary    Duloxetine 20 mg 3 times a day, increase risperidone 0.5 mg daily at bedtime  Ruthy reports she continues to have some obsessive thoughts regarding her life and relationships.  These thoughts cause her distress and we spoke about increasing Risperdal to decrease distorted thoughts.  She agreed reviewed side effects with her.  Takes medication as prescribed and wife is supportive.         Relevant Medications    risperiDONE (RisperDAL) 0.5 mg tablet    Generalized anxiety disorder    Reports continued occasional anxiety which she is able to cope with.  At recent presidential election is causing her concern.  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  Denies panic attacks.  Appetite and sleep patterns are reported as good         Relevant Medications    risperiDONE (RisperDAL) 0.5 mg tablet    Current severe episode of major depressive disorder without psychotic features (HCC)    Cymbalta 20 mg 3 times a day  She reports depression is mild denies suicidal ideation.  Relationships are good and she is motivated.         Relevant Medications    risperiDONE (RisperDAL) 0.5 mg tablet     Reason for visit is   Chief Complaint   Patient presents with    OCD    Depression    Anxiety    Medication Management     Encounter provider Marcia Gross, PhD    Provider located at [facility] [address]  [address]  [facility] [id number] [phone]    Recent Visits  No visits were found meeting these conditions.  Showing recent visits within past 7 days and meeting all other requirements  Future Appointments  No visits were found meeting these conditions.  Showing future appointments within next 150 days and meeting all other requirements       After connecting  through [employer], the patient was identified by name and date of birth. Ruthy Farley was informed that this is a telemedicine visit and that the visit is being conducted through the Epic Embedded platform. She agrees to proceed. which may not be secure and therefore, might not be HIPAA-compliant.  My office door was closed. No one else was in the room.  She acknowledged consent and understanding of privacy and security of the video platform. The patient has agreed to participate and understands they can discontinue the visit at any time.    SUBJECTIVE:    Ruthy Farley is a 24 y.o. female with a history of OCD, anxiety and depression seen for medication management and mood assessment.    HPI ROS Appetite Changes and Sleep: normal appetite and normal energy level    Review Of Systems:     Mood Anxiety   Behavior Normal    Thought Content Disturbing Thoughts, Feelings   General Emotional Problems   Personality Normal   Other Psych Symptoms Normal   Constitutional As Noted in HPI   ENT As Noted in HPI   Cardiovascular As Noted in HPI   Respiratory As Noted in HPI   Gastrointestinal As Noted in HPI   Genitourinary As Noted in HPI   Musculoskeletal As Noted in HPI   Integumentary As Noted in HPI   Neurological As Noted in HPI   Endocrine Normal    Other Symptoms Normal        Substance Abuse History:    Social History     Substance and Sexual Activity   Drug Use Not Currently       Family Psychiatric History:     Family History   Problem Relation Age of Onset    Diabetes Father     No Known Problems Brother     Breast cancer Neg Hx     Colon cancer Neg Hx     Ovarian cancer Neg Hx        Social History     Socioeconomic History    Marital status: Single     Spouse name: Not on file    Number of children: Not on file    Years of education: Not on file    Highest education level: Not on file   Occupational History    Occupation: Healthcare Coordinator   Tobacco Use    Smoking status: Former     Current  packs/day: 0.00     Types: Cigarettes     Quit date: 2022     Years since quittin.8    Smokeless tobacco: Never   Vaping Use    Vaping status: Never Used   Substance and Sexual Activity    Alcohol use: Yes     Comment: socially    Drug use: Not Currently    Sexual activity: Yes     Partners: Female     Birth control/protection: None   Other Topics Concern    Not on file   Social History Narrative    Not on file     Social Drivers of Health     Financial Resource Strain: Low Risk  (2022)    Overall Financial Resource Strain (CARDIA)     Difficulty of Paying Living Expenses: Not hard at all   Food Insecurity: No Food Insecurity (2022)    Hunger Vital Sign     Worried About Running Out of Food in the Last Year: Never true     Ran Out of Food in the Last Year: Never true   Transportation Needs: No Transportation Needs (2022)    PRAPARE - Transportation     Lack of Transportation (Medical): No     Lack of Transportation (Non-Medical): No   Physical Activity: Not on file   Stress: No Stress Concern Present (2022)    [employer] - Occupational Stress Questionnaire     Feeling of Stress : Not at all   Social Connections: Moderately Isolated (10/4/2022)    [facility]    Social Connection and Isolation Panel [NHANES]     Frequency of Communication with Friends and Family: Three times a week     Frequency of Social Gatherings with Friends and Family: More than three times a week     Attends [employer] Services: Never     Active Member of Clubs or Organizations: No     Attends Club or Organization Meetings: Never     Marital Status:    Intimate Partner Violence: Not At Risk (2022)    Humiliation, Afraid, Rape, and Kick questionnaire     Fear of Current or Ex-Partner: No     Emotionally Abused: No     Physically Abused: No     Sexually Abused: No   Housing Stability: Low Risk  (2022)    Housing  Stability Vital Sign     Unable to Pay for Housing in the Last Year: No     Number of Places Lived in the Last Year: 2     Unstable Housing in the Last Year: No       Past Medical History:   Diagnosis Date    Anxiety     Depression     Obsessive-compulsive disorder     Sleep difficulties        Past Surgical History:   Procedure Laterality Date    DENTAL SURGERY         Current Outpatient Medications   Medication Sig Dispense Refill    risperiDONE (RisperDAL) 0.5 mg tablet Take 1 tablet (0.5 mg total) by mouth daily at bedtime 30 tablet 3    CALCIUM PO Take by mouth Taking a Calcium/ magnesium and Zinc      DULoxetine (CYMBALTA) 20 mg capsule Take 1 capsule (20 mg total) by mouth 3 (three) times a day 90 capsule 3    ergocalciferol (VITAMIN D2) 50,000 units TAKE ONE TABLET BY MOUTH ONCE A WEEK 12 capsule 0    Galcanezumab-gnlm 120 MG/ML SOAJ Inject 1 mL under the skin every 30 (thirty) days Begin 30 days after loading dose 1 mL 11    Sodium Fluoride 5000 PPM 1.1 % PSTE BRUSH THOROUGHLY ONCE DAILY PREFERABLY AT BEDTIME FOR 2 MINUTES, EXPECTORATE AFTER USE, DO NOT RINSE.      vitamin B-12 (VITAMIN B-12) 1,000 mcg tablet Take 1 tablet (1,000 mcg total) by mouth daily 30 tablet 0     No current facility-administered medications for this visit.        No Known Allergies    The following portions of the patient's history were reviewed and updated as appropriate: allergies, current medications, past family history, past medical history, past social history, past surgical history, and problem list.    OBJECTIVE:     Mental Status Examination:    Appearance calm and cooperative , adequate hygiene and grooming, and good eye contact    Mood anxious   Affect affect appropriate    Speech a normal rate   Thought Processes normal thought processes   Hallucinations no hallucinations present    Thought Content no delusions   Abnormal Thoughts no suicidal thoughts  and no homicidal thoughts    Orientation  oriented to person and  "place and time   Remote Memory short term memory intact and long term memory intact   Attention Span concentration intact   Intellect Appears to be of Average Intelligence   Insight Insight intact   Judgement judgment was intact   Muscle Strength Muscle strength and tone were normal   Language no difficulty naming common objects   Fund of Knowledge displays adequate knowledge of current events   Pain none   Pain Scale 0       Laboratory Results: No results found for this or any previous visit.    Assessment/Plan:       Diagnoses and all orders for this visit:    Mixed obsessional thoughts and acts  -     Discontinue: risperiDONE (RisperDAL) 0.5 mg tablet; Take 1 tablet (0.5 mg total) by mouth 2 (two) times a day  -     risperiDONE (RisperDAL) 0.5 mg tablet; Take 1 tablet (0.5 mg total) by mouth daily at bedtime    Generalized anxiety disorder    Severe episode of recurrent major depressive disorder, without psychotic features (HCC)          Treatment Recommendations- Risks Benefits      Immediate Medical/Psychiatric/Psychotherapy Treatments and Any Precautions: Continue treatment plan increase Risperdal to 0.5 mg at night    Risks, Benefits And Possible Side Effects Of Medications:  {PSYCH RISK, BENEFITS AND POSSIBLE SIDE EFFECTS (Optional):07800     Psychotherapy Provided: 30 minutes  Supportive therapy  Medication evaluation  Mood assessment  Normalized and validated her feelings  Goals discussed in session: Maintain stable mood and reduce obsessive thinking     Treatment Plan:    Completed and signed during the session: Not applicable - Treatment Plan not due at this session    I spent 30 minutes with the patient during this visit.  \"Portions of the record may have been created with voice recognition software. Occasional wrong word or \"sound a like\" substitutions may have occurred due to the inherent limitations of voice recognition software. Read the chart carefully and recognize, using context, where " "substitutions have occurred. Please call if you have any questions. \"    Marcia Gross, PhD 11/24/24  "

## 2024-12-09 ENCOUNTER — CLINICAL SUPPORT (OUTPATIENT)
Dept: FAMILY MEDICINE CLINIC | Facility: CLINIC | Age: 24
End: 2024-12-09

## 2024-12-09 VITALS — WEIGHT: 232 LBS | BODY MASS INDEX: 41.11 KG/M2 | HEIGHT: 63 IN

## 2024-12-09 NOTE — PROGRESS NOTES
" Nutrition Assessment Form    Patient Name: Ruthy Farley    YOB: 2000    Sex: Female     Assessment Date: 12/9/2024  Start Time: 11:35am Stop Time: 12:05 pm Total Minutes: 30     Data:  Present at session: self   Parent/Patient Concerns/reason for visit:  \"I have been trying to lose wt\"   Medical Dx/Reason for Referral: LIFESTYLE NUTRITION-[facility]     E78.1 (ICD-10-CM) - Hypertriglyceridemia  Provider: Teresa Montnaa DO   Referred d/t hx of ED/DE   Past Medical History:   Diagnosis Date    Anxiety     Depression     Obsessive-compulsive disorder     Sleep difficulties        Current Outpatient Medications   Medication Sig Dispense Refill    CALCIUM PO Take by mouth Taking a Calcium/ magnesium and Zinc      DULoxetine (CYMBALTA) 20 mg capsule Take 1 capsule (20 mg total) by mouth 3 (three) times a day 90 capsule 3    ergocalciferol (VITAMIN D2) 50,000 units TAKE ONE TABLET BY MOUTH ONCE A WEEK 12 capsule 0    Galcanezumab-gnlm 120 MG/ML SOAJ Inject 1 mL under the skin every 30 (thirty) days Begin 30 days after loading dose 1 mL 11    risperiDONE (RisperDAL) 0.5 mg tablet Take 1 tablet (0.5 mg total) by mouth daily at bedtime 30 tablet 3    Sodium Fluoride 5000 PPM 1.1 % PSTE BRUSH THOROUGHLY ONCE DAILY PREFERABLY AT BEDTIME FOR 2 MINUTES, EXPECTORATE AFTER USE, DO NOT RINSE.      vitamin B-12 (VITAMIN B-12) 1,000 mcg tablet Take 1 tablet (1,000 mcg total) by mouth daily 30 tablet 0     No current facility-administered medications for this visit.        Additional Meds/Supplements: Calcium + magnesium+ zinc supplement (ok'd by )   Special Learning Needs/barriers to learning/any new barriers N/A   Height: 5'3\" Ht Readings from Last 5 Encounters:   12/09/24 5' 3\" (1.6 m)   10/15/24 5' 3\" (1.6 m)   09/23/24 5' 3\" (1.6 m)   09/23/24 5' 3\" (1.6 m)   08/15/24 5' 3\" (1.6 m)      Weight: virtual 232# (from home) Wt Readings from Last 10 Encounters:   12/09/24 108 kg (238 lb)   10/15/24 " "108 kg (238 lb 1.6 oz)   24 108 kg (238 lb 9.6 oz)   24 108 kg (238 lb)   08/15/24 109 kg (240 lb)   24 109 kg (240 lb)   24 110 kg (242 lb)   24 109 kg (241 lb 3.2 oz)   24 112 kg (246 lb 6.4 oz)   24 111 kg (244 lb 9.6 oz)     Estimated body mass index is 42.16 kg/m² as calculated from the following:    Height as of this encounter: 5' 3\" (1.6 m).    Weight as of this encounter: 108 kg (238 lb).   Recent Weight Change: [x]Yes     []No  Amount:  -14# x 7 mo      Energy Needs: Did not calculate macros at this time   No Known Allergies or intolerances NKFA   Social History     Substance and Sexual Activity   Alcohol Use Yes    Comment: socially    1-2x/month   Social History     Tobacco Use   Smoking Status Former    Current packs/day: 0.00    Types: Cigarettes    Quit date: 2022    Years since quittin.8   Smokeless Tobacco Never    N/A   Who shops? patient   Who cooks/cooking methods/Eating out/take out habits   patient  Food prepared outside of the house: 2-3x/month, goal is 1-2x/month     Exercise: Goes to the gym    Other: ie: Sleep habits/ stress level/ work habits household-lives with ?/ food security Goes to sleep around ~12 am, wakes up around 9 am .    Prior Nutritional Counseling? [x]Yes     []No  When:  10/15/24     Why:  Follow up with this writer.        Diet Hx: dietary habits are varied d/t less structure, in routine. Sometimes may have a few snacks after breakfast and then eat a larger meal  Breakfast: 10-11 am Yogurt & banana (before work out)  Cottage cheese + jam (after work out)   Lunch: 1pm Protein + vegetable + starch        Dinner: 6-7 pm 1 filet of salmon + rice & kepie singh + cucumber  Or  [place] + avocado + potato egg salad  Or  Chicken + rice/pasta + sometimes a vegetable     Snacks: Popcorn as a snack, baby oranges, berries, (after dinner snack) fruit salad, or yasso yogurt icecream bar -    Other Notes/ Initial Assessment:    The patient was " identified by name and date of birth. Ruthy was informed that this is a telemedicine visit and that the visit is being conducted through [employer]. She agrees to proceed..  My office door was closed. No one else was in the room.  She acknowledged consent and understanding of privacy and security of the video platform. The patient has agreed to participate and understands they can discontinue the visit at any time. Patient is aware this is a billable service.     12/09/24  *Lifestyle nutrition appointment seen at 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 She and her wife are planning to start looking for work-preferable in [place] or if not [place] but no set plans in mind.     2-3 months ago pt decided to try and lose wt. Her eating and workouts have changed as a result. Pt stated she started this right around the last appointment but forgot to mention it.  Feels having a schedule with her internship has been helping with making the changes to her diet/activity and with not skipping meals and with planning.    Working out 5 days a week instead of 3 days a week. Cardio + sometime of strength training or core strengthening.  Heaviest is 248#, now she is 232#.  Still trying to only eat a sweet treat after dinner but that can sometimes be a smoothie or a yasso yogurt bar. Pt still is having cravings for sweets but so far has been able to find healthier alternatives. She has increased her vegetable intake-feels good about this.  Fluid intake goes up and down, currently working on drinking more, still mainly water.     Pt is trying to weigh herself only once a week, sometimes struggling if she sees a 1-2# wt gain. Pt will talk to her wife about this.  Pt states her wife is always supportive of pt but is still a little apprehensive with pt going back to restrictive/obsessive eating, Suggested pt and wife take some time out once per week to touch base on eachothers  feelings about this situation-pt likes this idea.      Pt was thinking a lot about losing wt and while she was trying to accept herself for who she is, because she was thinking about it so much and was feeling bad about her body and how clothes fit.  Pt now is in a better place to be able to make some changes and feels she is in a better head space.  Pt is not calorie counting, feels that will be triggering for her.  This writer sent over [employer] meal plan handout which gives examples of balanced meals and snacks (ex: lunch may be 3 protein, 1 fat, 2-3 CHO, 2 vegetable) and there is info included on what 1 unit from each group looks like. Pt feels this may be helpful to help be a guide for balance and portions.     Pt finds herself getting thrown off when they go out or have an event-often finding herself scrambling around with what to make the next day or so. Discussed if has something planned to perp for the day after the event or start to work on building up freezer meals.     Follow up scheduled 02/13/25 --still job hunting? Moving?  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  Pt is also moving in March to [place]. Pt plans to try to mealprep lunch-plans to stick with salads sine they are currently working for her.     Pt going to the  4 days a week other than this week-pt is feeling a little sick.  Pt has been trying to also go to the gym in the morning before wife has to go to work but having a harder time to go to bed-has a routine but if strays even from the routine a little she feels like she has failed.    Making baklava bites that use dates as the sweetener or just regular dates as a sweet treat. Before the gym has been eating sugar free raspberry jam with cottage cheese.     Eating eggs less often now, has  "been eating salad for lunch-no dressing, some cheese and avocado, some croutons (small handful), a protein,  Still trying to eat a lot of vegetables for dinner.-lately it has been cauliflower and spinach, brussels sprouts.     Mon, tues, wed eats a salmon filet or another fish & has poultry the other days. Lately has often been having a regular or sweet potato as the starch. Pt is incorporation of avocado    Pt has been struggling with drinking as much water as she had been-more so has been forgetting. Having ~ 1 can of seltzer.    Pt is looking for lower-fat items deli meats, shredded cheese, fat free cottage cheese.     Pt feels sometimes she gets in her head and it comes and goes in was with being ok with having a balance with her eating and exercising and other ties if she doesn't do it perfectly  then all Is lost and loses motivation. Pt also notes has wife is concerned pt sometimes may be too focused on \"diet culture\" with some comments such as:  Pt is finding a hard time balancing doing things for her health vs getting back into restrictive eating. Pt plans to have a conversation with her wife to help reassure her that her mindset is for health not disordered eating. Pt is also trying to limit screen time.     Pt would like to build up an arsenal of recipes-discussed tips.     Follow up scheduled 12/10/24 Lifestyle nutrition pt    __________________________________________________________________________________________________________________________________________________________________    09/06/24  Appointment started late, pt thought it was at 12:30 when it was in fact @ 11:30 am. This writer was able to accommodate for this today.  Pt had an appointment 09/24 but wished to have one sooner to talk through some issues.    Pt has been trying to do something other than eggs 3x/week such as oatmeal or yogurt but hasn't been doing well.  States she is now repulsed by egg whites, now has 2 regular eggs.  " "Trying to eat a vegetable-raw or cooked with every meal so sometimes has thiem with breakfast +  whole grain toast.  Pt knows though not wanting to increase her cholesterol meals not eating eggs every day/so often. Discussed some tips including trying leftovers, maybe homemade pancakes and she can put in different mix ins such as fruit/nuts-discussed portions for high kcal foods like nuts.    Has been trying to reduce eating sweets but only eating them after dinner but lately has been getting more cravings for sweets around 1-3 pm.  Pt has been going to bed and getting up earlier, now has a more balanced lunch. Suggested pt try to have a fruit or a \"mock\" dessert with or after lunch to see if htat fills the craving for something sweet.  Pt is open to this but admits to struggling with thinking of ideas and sometimes motivation. Suggested looking at the apps [employer] or [employer] for some ideas--today suggested energy bites, homemade granola bars, fruit dippend in a little melted chocolate, topping fruit with cool-whip or whippped cream, ricecakes with a little peanut aidee or nutella and fruit.     Pt feels she is doing better with water but struggles remembering-thinks she will get a large water bottle and fill it with what she should get in a day (discussed estimated needs). Pt is staying consistent with going ot the gym 3 days/week. Pt asked about when she should get her next lipid panel. Last one was 04/2024, discussed possibly 6 mo later so 10/2024. This writer reached out to pt's PCP who is on board with this.     F/U scheduled 10/15/24-Lifestyle nutrition appointment.     ___________________________________________________________________________________________________________________  08/06/24    Pt has met goal #1 for breakfast, also switched to low-fat cheese instead of full fat cheese.  As for goal #2 Pt has started to make a salad with red/white cabbage, other vegetables, beans--likes the way that this " "gets more vegetables and fiber, more balanced meals. She has been getting more interested and motivated to make meals where as she wasn't as much before.   Pt also states it makes her feel good about herself that she is meeting her goals.  When discussed continuing the goals or making new ones. This writer gave pt the ok if she wants to stay with the same goals if she feels she still has to work at meeting them... until they become more of a second nature habit. Pt stated she would like to do this also in line with helping her to find balance vs needing to do \"all of the things\" all at once.       Trying to limit sweets to 1 sweet treat at the end of the day and make some other changes but then felt overwhelmed and triggered (r/t disordered eating) so decided to go back to what had been discussed with sticking with only working on our previously set goals.    Pt has been consistent with going to the gym 3 days a week and proud of herself for sticking with it.  Pt also went to a restorative yoga class and liked it, felt it helped with mindfulness, this class is offered ~ once a month as far as pt knowsn.     Pt is working on figuring gout her hunger and full cues. Feels a hard time deciding if she wants to eat something because is hungry for it, if she is craving it, reviewed additional tips for mindfulness. Also suggested trying to have something healthier but sweet like fruit, low-fat yogurt either before eating her sweet treat or after.     Pt states since she is going to thr gym she is daydreaming less and has less need for the trampoline. She states she is also starting to look for a job.     F/U 09/24/24  ____________________________________________________________________________________________________________________________________________  07/08/24  Pt lives with wife who is a vegetarian and they have different tastes in food. Main proteins are chicken  Pt had an eating disorder from ~14 yo on and off " until her 2nd year of college. Pt notes recent labs, especially lipid panel was high.  Pt feels like she has a good relationship with her body and her relationship with food but does want to slow wt gain and improve her labs but not falling back to disordered eating habits.  Pt's triglycerides increased over 100 from 10/23-04/24 hx an increase in ~20 for her cholesterol in the same time frame. Pt with family hx of DM and the last time her A1c was checked was 05/2023. This writer reached out to pt's referring dr about getting an order to have A1c checked-pt is also agreeable to this.         Hx of eating disorders: started around 13 years old. She and her friend started to focus on wanting to lose wt. States on 15th or 16th birthday she remembers being on a diet where she mainly ate buckwheat and then would binge eat the next week, buckwheat one week, binge the next. Around that time she realized she started missing her period and it was a shock to her of not wanting to mess with her health. She started to eat more balanced eating and incorporate more fats and her period came back. From ~ 16 years old until 2nd year of college there was some restriction, negative body image, felt there were a lot of foods she couldn't eat. Pt used to weigh herself daily but then threw away her scale and now only gets wt checked at Dr's appointments.  Did not get wt today, will consider at future in person appointment.     Pt then started to focus less on eating after her 2nd year of college but then noticed her wt was creeping up when she would get weighed at the drs office. Pt notes during that time she would daydream and often jumping a lot. Pt tried to work on day dreaming less d/t feeling it is embarrassing and then also stopped jumping-feels like that was a good source of activity.  Pt still sometimes thinks about daydreaming and they got a trampoline but states she has to be in the mindset to daydream and jump so at this  "time.    Graduated 2022 at [employer]. Pt was working but was recently laid off. Pt plans to start looking for work again after the summer. Pt's wife is currently working. Pt is home during the day.  Pt started working out to decompress after work. Goal is to work out to be healthy but still has the mindset of hyper fixating on burning more calories.  Pt feels in college she started to read more about body positivity and feminism and that was helpful for her mindset.     Fluids: since getting her labs back she is drinking 1-2 of her ~24-28 oz cup of water per day.  When its not that hot she will drink decaf black tea (1-4c/day), 1c seltzer per day.  Pt also notes eating more processed sugar snacks prior to her labs coming in. Now has been snacking on more fruit-often berries (fresh or frozen), or raw vegetables. Later on in the evening pt then will often eat more sweet things.     Pt states she isnt sure if she likes structure or if she doesn't. Right now she likes the idea of not having structure for most of her day but is ok with the idea of structuring some of her meals and feels that would be helpful. She would like to have some guidance on reducing foods that may be higher in cholesterol as well as increasing her vegetables. She would also like some guidance on balancing meals.  Sometime for later on may be \"decriminalizing\" sweets/desserts as well as finding some healthier alteratives for sweet foods.  Pt feels she is the type of person to have a larger portion of her favorite dessert less often vs having a smaller portion more often.     Follow up 08/06/24 Virtual, use email---ask about purging. If appointment is on a Tues/Thurs needs to be virtual d/t wife having the car, can come in person Monday or Friday.        Nutrition Diagnosis:   Altered Nutrition-Related Laboratory values  related to hyper triglyceridemia as evidenced by  elevated triglycerides.       Any change or new dx since previous visit:   "   Nutrition Diagnosis:         Medical Nutrition Therapy Intervention:  [x]Individualized Meal Plan: did not focus on calories/macro [x]Understanding Lab Values: lipid panel, BG, A1c   []Basic Pathophysiology of Disease []Food/Medication Interactions   []Food Diary [x]Exercise: recommend 150 min/week   [x]Lifestyle/Behavior Modification Techniques: finding balance, HAES, reducing cholesterol, increasing vegetables []Medication, Mechanism of Action   []Label Reading: CHO/ Na/ Fat/ other_________ []Self Blood Glucose Monitoring   [x]Weight/BMI Goals: gain/lose/maintain: 200# goal wt long term, 220# short term (-5% from current wt) []Other -           Comprehension: []Excellent  [x]Very Good  []Good  []Fair   []Poor    Receptivity: []Excellent  [x]Very Good  []Good  []Fair   []Poor    Expected Compliance: []Excellent  [x]Very Good  []Good  []Fair   []Poor        Goals: 12/09/24  Aim to incorportate a vegetable with breakfast most days.   2.    Continue with working out 3-4x/wek   3.       Goals: 10/15/24  Continue to work out 4-5 days a week   2.    Prep food for the day or so after an event or make sure there are freezer meals to cover the day or two after to help prevent straying too far from healthy eating routine.    3.     Goals: 09/06/24--  Every other time having eggs for breakfast make the following changes: 3-4 egg whites (no whole eggs), add vegetables, substitute 1-2 pieces of higher fiber breakfast for breakfast meat   2. On the same says as having the altered egg white breakfast, for dinner: 1c starch/starchy vegetable, 1+ c non-starchy vegetable, 5-6oz lean protein   3.       Goals: 08/06/24--  Every other time having eggs for breakfast make the following changes: 3-4 egg whites (no whole eggs), add vegetables, substitute 1-2 pieces of higher fiber breakfast for breakfast meat   2. On the same says as having the altered egg white breakfast, for dinner: 1c starch/starchy vegetable, 1+ c non-starchy  "vegetable, 5-6oz lean protein   3.     Goals (initial): 07/08/24  Every other time having eggs for breakfast make the following changeS: 3-4 egg whites (no whole eggs), add vegetables, substitute 1-2 pieces of higher fiber bread for breakfast meat   2. On the same says as having the altered egg white breakfast, for dinner: 1c starch/starchy vegetable, 1+ c non-starchy vegetable, 5-6oz lean protein   3.     Labs:  CMP  Lab Results   Component Value Date    K 4.4 04/22/2024     04/22/2024    CO2 28 04/22/2024    BUN 12 04/22/2024    CREATININE 0.75 04/22/2024    GLUF 84 04/22/2024    CALCIUM 9.0 04/22/2024    AST 20 04/22/2024    ALT 19 04/22/2024    ALKPHOS 98 04/22/2024    EGFR 112 04/22/2024       BMP  Lab Results   Component Value Date    CALCIUM 9.0 04/22/2024    K 4.4 04/22/2024    CO2 28 04/22/2024     04/22/2024    BUN 12 04/22/2024    CREATININE 0.75 04/22/2024       Lipids  No results found for: \"CHOL\"  Lab Results   Component Value Date    HDL 36 (L) 11/04/2024    HDL 41 (L) 04/22/2024    HDL 43 (L) 10/21/2023     Lab Results   Component Value Date    LDLCALC 103 (H) 11/04/2024    LDLCALC 99 04/22/2024    LDLCALC 102 (H) 10/21/2023     Lab Results   Component Value Date    TRIG 160 (H) 11/04/2024    TRIG 231 (H) 04/22/2024    TRIG 112 10/21/2023     No results found for: \"CHOLHDL\"    Hemoglobin A1C  Lab Results   Component Value Date    HGBA1C 5.3 05/11/2023       Fasting Glucose  Lab Results   Component Value Date    GLUF 84 04/22/2024       Insulin     Thyroid  Lab Results   Component Value Date    TSH 1.01 10/15/2022       Hepatic Function Panel  Lab Results   Component Value Date    ALT 19 04/22/2024    AST 20 04/22/2024    ALKPHOS 98 04/22/2024       Celiac Disease Antibody Panel  No results found for: \"ENDOMYSIAL IGA\", \"GLIADIN IGA\", \"GLIADIN IGG\", \"IGA\", \"TISSUE TRANSGLUT AB\", \"TTG IGA\"   Iron  Lab Results   Component Value Date    IRON 71 04/22/2024    TIBC 455 (H) 04/22/2024    FERRITIN " 11 04/22/2024            ASHISH ZapataN  [facility] CLINICAL NUTRITION SERVICES  [address] [place] [id number]

## 2024-12-10 ENCOUNTER — NURSE TRIAGE (OUTPATIENT)
Age: 24
End: 2024-12-10

## 2024-12-10 ENCOUNTER — PATIENT MESSAGE (OUTPATIENT)
Dept: NEUROLOGY | Facility: CLINIC | Age: 24
End: 2024-12-10

## 2024-12-10 ENCOUNTER — TELEPHONE (OUTPATIENT)
Age: 24
End: 2024-12-10

## 2024-12-10 NOTE — TELEPHONE ENCOUNTER
"Spoke with patient who reports she has had continued vulvar itching since October. She denies any abnormal vaginal discharge or pain.  She would like an appt, but would like to switch practices.  She was made an appt for tomorrow at [place].  No further questions or concerns at this time.    Reason for Disposition   Vulvar itching and not improved > 3 days following Care Advice    Answer Assessment - Initial Assessment Questions  1. SYMPTOM: \"What's the main symptom you're concerned about?\" (e.g., rash, itching, swelling, dryness)      Vulvar itching  3. ONSET: \"When did this  start?\"      October  4. PAIN: \"Is there any pain?\" If Yes, ask: \"How bad is it?\" (Scale: 1-10; mild, moderate, severe)      denies  5. CAUSE: \"What do you think is causing the symptoms?\"      unsure  6. OTHER SYMPTOMS: \"Do you have any other symptoms?\" (e.g., fever, vaginal bleeding, pain with urination)      denies  7. PREGNANCY: \"Is there any chance you are pregnant?\" \"When was your last menstrual period?\"      11/28    Protocols used: Vulvar Symptoms-Adult-[place]    "

## 2024-12-10 NOTE — TELEPHONE ENCOUNTER
Pt requesting f/u appt with Marcia Gross. Writer scheduled virtual follow up on 1/15/25 at 12 pm with Marcia Gross.

## 2024-12-11 ENCOUNTER — OFFICE VISIT (OUTPATIENT)
Dept: OBGYN CLINIC | Facility: CLINIC | Age: 24
End: 2024-12-11
Payer: COMMERCIAL

## 2024-12-11 VITALS
BODY MASS INDEX: 41.14 KG/M2 | HEIGHT: 63 IN | SYSTOLIC BLOOD PRESSURE: 82 MMHG | WEIGHT: 232.2 LBS | DIASTOLIC BLOOD PRESSURE: 62 MMHG

## 2024-12-11 DIAGNOSIS — N89.8 VAGINAL ITCHING: Primary | ICD-10-CM

## 2024-12-11 PROCEDURE — 99213 OFFICE O/P EST LOW 20 MIN: CPT | Performed by: PHYSICIAN ASSISTANT

## 2024-12-11 NOTE — PATIENT COMMUNICATION
I spoke to patient; she will try to get pen filled early and transport to [place] where she can inject on due date; she wcb if needed.

## 2024-12-12 LAB
BV BACTERIA RRNA VAG QL NAA+PROBE: NEGATIVE
C GLABRATA RNA VAG QL NAA+PROBE: NOT DETECTED
C TRACH RRNA SPEC QL NAA+PROBE: NOT DETECTED
CANDIDA RRNA VAG QL PROBE: NOT DETECTED
N GONORRHOEA RRNA SPEC QL NAA+PROBE: NOT DETECTED
T VAGINALIS RRNA SPEC QL NAA+PROBE: NOT DETECTED

## 2024-12-13 ENCOUNTER — RESULTS FOLLOW-UP (OUTPATIENT)
Dept: OBGYN CLINIC | Facility: CLINIC | Age: 24
End: 2024-12-13

## 2024-12-19 ENCOUNTER — TELEPHONE (OUTPATIENT)
Age: 24
End: 2024-12-19

## 2024-12-19 NOTE — TELEPHONE ENCOUNTER
Ruthy Farley and/or patient requested a call back to discuss patient stated she had an ADHD screening done. Patient is asking if provider received the results and if she can share the results with patient.    They can be reached at P# [phone].       Thank you.

## 2024-12-20 NOTE — PROGRESS NOTES
Assessment/Plan:      Diagnoses and all orders for this visit:    Vaginal itching  -     Sureswab(R), Vaginosis/Vaginitis Plus          Subjective:     Patient ID: Ruthy Farley is a 24 y.o. female.    Pt presents for a problem today  She complains of vaginal itching off and on x several months  No major discharge  No odor  No abnormal bleeding  Periods are regular  Bowel and bladder are ok     Culture done  Products discussed        Review of Systems   Constitutional:  Negative for chills, fever and unexpected weight change.   HENT:  Negative for ear pain and sore throat.    Eyes:  Negative for pain and visual disturbance.   Respiratory:  Negative for cough and shortness of breath.    Cardiovascular:  Negative for chest pain and palpitations.   Gastrointestinal:  Negative for abdominal pain, blood in stool, constipation, diarrhea and vomiting.   Genitourinary:  Positive for vaginal discharge and vaginal pain. Negative for dysuria and hematuria.   Musculoskeletal:  Negative for arthralgias and back pain.   Skin:  Negative for color change and rash.   Neurological:  Negative for seizures and syncope.   All other systems reviewed and are negative.        Objective:     Physical Exam  Vitals and nursing note reviewed.   Constitutional:       Appearance: She is well-developed.   Genitourinary:     Exam position: Supine.      Labia:         Right: No rash or lesion.         Left: No rash or lesion.       Vagina: Normal.      Cervix: No cervical motion tenderness, discharge or friability.   Lymphadenopathy:      Lower Body: No right inguinal adenopathy. No left inguinal adenopathy.

## 2024-12-23 NOTE — TELEPHONE ENCOUNTER
Called and left VM requesting a call back to tell her that Dr. Gross will review at next appointment.

## 2024-12-24 NOTE — TELEPHONE ENCOUNTER
Patient called in returning nurses message.    Writer researched and relayed that dr. Gross will review the ADHD testing at their next appointment.    Patient expressed verbal understanding.

## 2024-12-31 DIAGNOSIS — E55.9 VITAMIN D DEFICIENCY: Primary | ICD-10-CM

## 2025-01-13 DIAGNOSIS — E55.9 VITAMIN D INSUFFICIENCY: ICD-10-CM

## 2025-01-15 ENCOUNTER — TELEMEDICINE (OUTPATIENT)
Dept: PSYCHIATRY | Facility: CLINIC | Age: 25
End: 2025-01-15
Payer: COMMERCIAL

## 2025-01-15 DIAGNOSIS — F33.2 SEVERE EPISODE OF RECURRENT MAJOR DEPRESSIVE DISORDER, WITHOUT PSYCHOTIC FEATURES (HCC): ICD-10-CM

## 2025-01-15 DIAGNOSIS — F42.2 MIXED OBSESSIONAL THOUGHTS AND ACTS: ICD-10-CM

## 2025-01-15 DIAGNOSIS — F41.1 GAD (GENERALIZED ANXIETY DISORDER): Primary | ICD-10-CM

## 2025-01-15 PROCEDURE — 90836 PSYTX W PT W E/M 45 MIN: CPT | Performed by: NURSE PRACTITIONER

## 2025-01-15 PROCEDURE — 99215 OFFICE O/P EST HI 40 MIN: CPT | Performed by: NURSE PRACTITIONER

## 2025-01-15 RX ORDER — CLONAZEPAM 0.5 MG/1
0.5 TABLET ORAL 2 TIMES DAILY PRN
Qty: 60 TABLET | Refills: 0 | Status: SHIPPED | OUTPATIENT
Start: 2025-01-15

## 2025-01-15 RX ORDER — ERGOCALCIFEROL 1.25 MG/1
CAPSULE, LIQUID FILLED ORAL WEEKLY
Qty: 12 CAPSULE | Refills: 0 | Status: SHIPPED | OUTPATIENT
Start: 2025-01-15

## 2025-01-16 NOTE — ASSESSMENT & PLAN NOTE
Klonopin 0.5 mg twice daily as needed, Cymbalta 60 mg daily(20 mg 3 times daily)  She reports medication is effective when she becomes anxious and avoids panic attacks.  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  She is social reports appetite and sleep patterns are good.

## 2025-01-16 NOTE — ASSESSMENT & PLAN NOTE
Cymbalta 20 mg capsule 3 times daily, Vraylar 1.5 mg started  Reports depression is mild to moderate.  Reviewed neuropsych exam extensively with her which indicates anxiety and depression.  Denies suicidal ideation support was provided.  The purpose of the neuropsych exam was to determine if she had ADHD.  Reviewed the report which concluded that she did not have ADHD and predominantly anxiety and depression.

## 2025-01-16 NOTE — PSYCH
Virtual Regular Visit    Problem List Items Addressed This Visit          Behavioral Health    Mixed obsessional thoughts and acts    Risperdal discontinued, Vraylar 1.5 mg    Ruthy reports medication was mildly effective for OCD and intrusive thoughts.  Continues with thoughts about danger, feeling something is going to happen and anxiety regarding relationship.  Discussed neuropsychological evaluation which reveals high intelligence, anxiety and depression.  Denies suicidal ideations.  Aims negative         JAEL (generalized anxiety disorder) - Primary    Klonopin 0.5 mg twice daily as needed, Cymbalta 60 mg daily(20 mg 3 times daily)  She reports medication is effective when she becomes anxious and avoids panic attacks.  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  She is social reports appetite and sleep patterns are good.         Relevant Medications    cariprazine (Vraylar) 1.5 MG capsule    clonazePAM (KlonoPIN) 0.5 mg tablet    Current severe episode of major depressive disorder without psychotic features (HCC)    Cymbalta 20 mg capsule 3 times daily, Vraylar 1.5 mg started  Reports depression is mild to moderate.  Reviewed neuropsych exam extensively with her which indicates anxiety and depression.  Denies suicidal ideation support was provided.  The purpose of the neuropsych exam was to determine if she had ADHD.  Reviewed the report which concluded that she did not have ADHD and predominantly anxiety and depression.         Relevant Medications    cariprazine (Vraylar) 1.5 MG capsule    clonazePAM (KlonoPIN) 0.5 mg tablet     Reason for visit is   Chief Complaint   Patient presents with    OCD    Anxiety    Depression    Medication Management     Encounter provider Marcia Gross, PhD    Provider located at [facility] [address]  [address]  [facility] [address] [phone]    Recent Visits  Date  Type Provider Dept   01/15/25 Telemedicine Marcia Gross, PhD [facility]   Showing recent visits within past 7 days and meeting all other requirements  Future Appointments  No visits were found meeting these conditions.  Showing future appointments within next 150 days and meeting all other requirements       After connecting through clypdideo, the patient was identified by name and date of birth. Ruthy Farley was informed that this is a telemedicine visit and that the visit is being conducted through the Epic Embedded platform. She agrees to proceed. which may not be secure and therefore, might not be HIPAA-compliant.  My office door was closed. No one else was in the room.  She acknowledged consent and understanding of privacy and security of the video platform. The patient has agreed to participate and understands they can discontinue the visit at any time.    SUBJECTIVE:    Ruthy Farley is a 24 y.o. female with a history of anxiety, depression, OCD seen for medication management and mood assessment.    HPI ROS Appetite Changes and Sleep: normal appetite and normal energy level    Review Of Systems:     Mood Anxiety and Depression   Behavior Normal    Thought Content Normal   General Emotional Problems   Personality Normal   Other Psych Symptoms Normal   Constitutional As Noted in HPI   ENT As Noted in HPI   Cardiovascular As Noted in HPI   Respiratory As Noted in HPI   Gastrointestinal As Noted in HPI   Genitourinary As Noted in HPI   Musculoskeletal As Noted in HPI   Integumentary As Noted in HPI   Neurological As Noted in HPI   Endocrine Normal    Other Symptoms Normal        Substance Abuse History:    Social History     Substance and Sexual Activity   Drug Use Not Currently       Family Psychiatric History:     Family History   Problem Relation Age of Onset    Diabetes Father     No Known Problems Brother     Breast cancer Neg Hx     Colon cancer Neg Hx     Ovarian  cancer Neg Hx        Social History     Socioeconomic History    Marital status: Single     Spouse name: Not on file    Number of children: Not on file    Years of education: Not on file    Highest education level: Not on file   Occupational History    Occupation: Healthcare Coordinator   Tobacco Use    Smoking status: Former     Current packs/day: 0.00     Types: Cigarettes     Quit date: 2022     Years since quittin.9    Smokeless tobacco: Never   Vaping Use    Vaping status: Never Used   Substance and Sexual Activity    Alcohol use: Yes     Comment: socially    Drug use: Not Currently    Sexual activity: Yes     Partners: Female     Birth control/protection: None   Other Topics Concern    Not on file   Social History Narrative    Not on file     Social Drivers of Health     Financial Resource Strain: Low Risk  (2022)    Overall Financial Resource Strain (CARDIA)     Difficulty of Paying Living Expenses: Not hard at all   Food Insecurity: No Food Insecurity (2022)    Hunger Vital Sign     Worried About Running Out of Food in the Last Year: Never true     Ran Out of Food in the Last Year: Never true   Transportation Needs: No Transportation Needs (2022)    PRAPARE - Transportation     Lack of Transportation (Medical): No     Lack of Transportation (Non-Medical): No   Physical Activity: Not on file   Stress: No Stress Concern Present (2022)    [place] [employer] [place] [employer] - Occupational Stress Questionnaire     Feeling of Stress : Not at all   Social Connections: Moderately Isolated (10/4/2022)    Received from [facility], [facility]    Social Connection and Isolation Panel [NHANES]     Frequency of Communication with Friends and Family: Three times a week     Frequency of Social Gatherings with Friends and Family: More than three times a week     Attends Yarsanism Services: Never     Active Member of Clubs or Organizations: No     Attends  Club or Organization Meetings: Never     Marital Status:    Intimate Partner Violence: Not At Risk (12/5/2022)    Humiliation, Afraid, Rape, and Kick questionnaire     Fear of Current or Ex-Partner: No     Emotionally Abused: No     Physically Abused: No     Sexually Abused: No   Housing Stability: Low Risk  (12/5/2022)    Housing Stability Vital Sign     Unable to Pay for Housing in the Last Year: No     Number of Places Lived in the Last Year: 2     Unstable Housing in the Last Year: No       Past Medical History:   Diagnosis Date    Anxiety     Depression     Obsessive-compulsive disorder     Sleep difficulties        Past Surgical History:   Procedure Laterality Date    DENTAL SURGERY         Current Outpatient Medications   Medication Sig Dispense Refill    cariprazine (Vraylar) 1.5 MG capsule Take 1 capsule (1.5 mg total) by mouth daily 30 capsule 3    clonazePAM (KlonoPIN) 0.5 mg tablet Take 1 tablet (0.5 mg total) by mouth 2 (two) times a day as needed for anxiety 60 tablet 0    DULoxetine (CYMBALTA) 20 mg capsule Take 1 capsule (20 mg total) by mouth 3 (three) times a day 90 capsule 3    ergocalciferol (VITAMIN D2) 50,000 units TAKE ONE TABLET BY MOUTH ONCE A WEEK 12 capsule 0    Galcanezumab-gnlm 120 MG/ML SOAJ Inject 1 mL under the skin every 30 (thirty) days Begin 30 days after loading dose 1 mL 11    vitamin B-12 (VITAMIN B-12) 1,000 mcg tablet Take 1 tablet (1,000 mcg total) by mouth daily 30 tablet 0    CALCIUM PO Take by mouth Taking a Calcium/ magnesium and Zinc      Sodium Fluoride 5000 PPM 1.1 % PSTE BRUSH THOROUGHLY ONCE DAILY PREFERABLY AT BEDTIME FOR 2 MINUTES, EXPECTORATE AFTER USE, DO NOT RINSE.       No current facility-administered medications for this visit.        No Known Allergies    The following portions of the patient's history were reviewed and updated as appropriate: allergies, current medications, past family history, past medical history, past social history, past surgical  history, and problem list.    OBJECTIVE:     Mental Status Examination:    Appearance calm and cooperative , adequate hygiene and grooming, and good eye contact    Mood anxious   Affect affect appropriate    Speech a normal rate   Thought Processes normal thought processes   Hallucinations no hallucinations present    Thought Content no delusions   Abnormal Thoughts no suicidal thoughts  and no homicidal thoughts    Orientation  oriented to person and place and time   Remote Memory short term memory intact and long term memory intact   Attention Span concentration impaired   Intellect Appears to be of Average Intelligence   Insight Insight intact   Judgement judgment was intact   Muscle Strength Muscle strength and tone were normal   Language no difficulty naming common objects   Fund of Knowledge displays adequate knowledge of current events   Pain none   Pain Scale 0       Laboratory Results: No results found for this or any previous visit.    Assessment/Plan:       Diagnoses and all orders for this visit:    JAEL (generalized anxiety disorder)  -     cariprazine (Vraylar) 1.5 MG capsule; Take 1 capsule (1.5 mg total) by mouth daily  -     clonazePAM (KlonoPIN) 0.5 mg tablet; Take 1 tablet (0.5 mg total) by mouth 2 (two) times a day as needed for anxiety    Mixed obsessional thoughts and acts    Severe episode of recurrent major depressive disorder, without psychotic features (HCC)          Treatment Recommendations- Risks Benefits      Immediate Medical/Psychiatric/Psychotherapy Treatments and Any Precautions: Continue treatment plan discontinue Risperdal, start Vraylar 1.5 mg    Risks, Benefits And Possible Side Effects Of Medications:  {PSYCH RISK, BENEFITS AND POSSIBLE SIDE EFFECTS (Optional):01403    Controlled Medication Discussion: She is aware of safe use and storage of medication    Psychotherapy Provided: 45 minutes  Supportive therapy  Medication evaluation  Mood assessment  Surveys reviewed and  "confirmed  Normalized and validated her feelings  Reviewed neuropsych exam  Goals discussed in session: Maintain stable mood, reduce anxiety         Treatment Plan:    Completed and signed during the session: Not applicable - Treatment Plan not due at this session    I spent 45 minutes with the patient during this visit.  \"Portions of the record may have been created with voice recognition software. Occasional wrong word or \"sound a like\" substitutions may have occurred due to the inherent limitations of voice recognition software. Read the chart carefully and recognize, using context, where substitutions have occurred. Please call if you have any questions. \"    Marcia Gross, PhD 01/16/25  "

## 2025-01-16 NOTE — ASSESSMENT & PLAN NOTE
Risperdal discontinued, Vraylar 1.5 mg    Ruthy reports medication was mildly effective for OCD and intrusive thoughts.  Continues with thoughts about danger, feeling something is going to happen and anxiety regarding relationship.  Discussed neuropsychological evaluation which reveals high intelligence, anxiety and depression.  Denies suicidal ideations.  Aims negative

## 2025-01-17 ENCOUNTER — TELEPHONE (OUTPATIENT)
Dept: PSYCHIATRY | Facility: CLINIC | Age: 25
End: 2025-01-17

## 2025-01-24 ENCOUNTER — TELEPHONE (OUTPATIENT)
Age: 25
End: 2025-01-24

## 2025-01-24 NOTE — TELEPHONE ENCOUNTER
Response from Faby Hope:  For now agreed, do not take Vraylar until talking to Dr. KOTHARI again. Looks like they helped schedule her sooner appt to speak to her and Dr. KOTHARI can get back to her next week as well if she has any other suggestions.     Nurse spoke with Ruthy Farley - reviewed response from clinician. Ruthy Farley verbalized understanding of same.

## 2025-01-24 NOTE — TELEPHONE ENCOUNTER
"Forwarding for covering provider review in Doctor / PA / CNRP absence.    Nurse spoke with Ruthy  #[phone]  (ok to leave detailed message on voice mail)     Next appt is 2/24/25 with Dr. Gross.      Ruthy states she took first dose of Vraylar last night - uncontrolled body movements, feeling like she needed to move her legs constant and keep turning in bed, was tried but could not fall asleep, very restless all night. These symptoms increased her anxiety and she then took a Klonopin.   Klonopin helped her to fall asleep but sleep was restless and she woke several times.   \"I do not want to take the Vraylar again.\"    This morning - feeling fine - no longer feeling restless.         Please advise.       "

## 2025-01-24 NOTE — TELEPHONE ENCOUNTER
LM on Ruthy's VM informing her that I was returning her call regarding Vraylar.      Will refer to Dr Gross for review.

## 2025-01-24 NOTE — TELEPHONE ENCOUNTER
Patient called and requested to speak with a nurse regarding her Vraylar medication. She reported experiencing some bad side effects such as difficulty sleeping, restlessness, and twitchiness. She does not think she can take this medication. Writer transferred pt. To nurse line for assistance.

## 2025-01-25 DIAGNOSIS — E53.8 VITAMIN B12 DEFICIENCY: ICD-10-CM

## 2025-01-25 DIAGNOSIS — F33.2 SEVERE EPISODE OF RECURRENT MAJOR DEPRESSIVE DISORDER, WITHOUT PSYCHOTIC FEATURES (HCC): ICD-10-CM

## 2025-01-25 DIAGNOSIS — F41.1 GENERALIZED ANXIETY DISORDER: ICD-10-CM

## 2025-01-27 RX ORDER — DULOXETIN HYDROCHLORIDE 20 MG/1
20 CAPSULE, DELAYED RELEASE ORAL 3 TIMES DAILY
Qty: 90 CAPSULE | Refills: 2 | Status: SHIPPED | OUTPATIENT
Start: 2025-01-27

## 2025-01-27 NOTE — TELEPHONE ENCOUNTER
LM on Ruthy's VM instructing her, as per Dr Okeefe, to stop taking the Vraylar.  Requested she call the office if she has any questions.

## 2025-01-28 RX ORDER — LANOLIN ALCOHOL/MO/W.PET/CERES
1000 CREAM (GRAM) TOPICAL DAILY
Qty: 30 TABLET | Refills: 3 | Status: SHIPPED | OUTPATIENT
Start: 2025-01-28

## 2025-02-24 ENCOUNTER — TELEPHONE (OUTPATIENT)
Age: 25
End: 2025-02-24

## 2025-02-24 NOTE — TELEPHONE ENCOUNTER
Patient is calling regarding cancelling an appointment.    Date/Time: 2/24/2025 2pm    Reason: sick    Patient was rescheduled: YES [x] NO []  If yes, when was Patient reschedule for: 2/26/2025 12pm    Patient requesting call back to reschedule: YES [] NO [x]

## 2025-02-26 ENCOUNTER — TELEPHONE (OUTPATIENT)
Dept: PSYCHIATRY | Facility: CLINIC | Age: 25
End: 2025-02-26

## 2025-02-26 ENCOUNTER — TELEMEDICINE (OUTPATIENT)
Dept: PSYCHIATRY | Facility: CLINIC | Age: 25
End: 2025-02-26
Payer: COMMERCIAL

## 2025-02-26 DIAGNOSIS — F41.1 GAD (GENERALIZED ANXIETY DISORDER): ICD-10-CM

## 2025-02-26 DIAGNOSIS — G47.09 OTHER INSOMNIA: ICD-10-CM

## 2025-02-26 DIAGNOSIS — G47.09 OTHER INSOMNIA: Primary | ICD-10-CM

## 2025-02-26 DIAGNOSIS — F42.2 MIXED OBSESSIONAL THOUGHTS AND ACTS: Primary | ICD-10-CM

## 2025-02-26 DIAGNOSIS — F33.2 SEVERE EPISODE OF RECURRENT MAJOR DEPRESSIVE DISORDER, WITHOUT PSYCHOTIC FEATURES (HCC): ICD-10-CM

## 2025-02-26 PROCEDURE — 99213 OFFICE O/P EST LOW 20 MIN: CPT | Performed by: NURSE PRACTITIONER

## 2025-02-26 RX ORDER — TRAZODONE HYDROCHLORIDE 50 MG/1
50 TABLET ORAL
Qty: 30 TABLET | Refills: 3 | Status: SHIPPED | OUTPATIENT
Start: 2025-02-26

## 2025-02-26 NOTE — BH CRISIS PLAN
Client Name: Ruthy Farley       Client YOB: 2000    [facility] Safety Plan      Creation Date: 3/25/24 Update Date: 2/26/25   Created By: Marcia Gross, PhD Last Updated By: Marcia Gross, PhD      Step 1: Warning Signs:   Warning Signs   no            Step 2: Internal Coping Strategies:   Internal Coping Strategies   walk, to gym, day dream, talk to wife TV   sweet to eat and drink tea.            Step 3: People and social settings that provide distraction:   Name Contact Information   gym     Places   gym           Step 4: People whom I can ask for help during a crisis:      Name Contact Information    wife cell      Step 5: Professionals or agencies I can contact during a crisis:      Clinican/Agency Name Phone Emergency Contact    [phone] Dr. Gross    [facility]  Sue Adan      [facility] Emergency Department Emergency Department Phone Emergency Department Address    [place]          Crisis Phone Numbers:   Suicide Prevention Lifeline: Call or Text  987 Crisis Text Line: Text HOME to 017-769   Please note: Some [facility] do not have a separate number for Child/Adolescent specific crisis. If your county is not listed under Child/Adolescent, please call the adult number for your county      Adult Crisis Numbers: Child/Adolescent Crisis Numbers   [facility]: [phone] [facility]: [phone]   [facility]: [phone] [facility]: [phone]   [facility]: [phone] [place]: [phone]   [facility]: [phone] [place]: [phone]   [facility]: [phone]   [facility]: [phone]   [facility]: [phone]   [facility]: [phone] (daytime) [phone] (after hours, weekends, holidays)      Step 6: Making the environment safer (plan for lethal means safety):   Patient did not identify any lethal methods: Yes     Optional: What is most  important to me and worth living for?   Wife, hope that things will be better one day. Not suicidal.      Jose A Safety Plan. Shannen Savage and Rubio Sen. Used with permission of the authors.

## 2025-02-26 NOTE — ASSESSMENT & PLAN NOTE
Continues with anxiety uses clonidine which is effective she has difficulty sleeping and trazodone was ordered at 50 mg.

## 2025-02-26 NOTE — ASSESSMENT & PLAN NOTE
Continues with anxiety, Klonopin occasionally is used and she can distract herself, having trouble sleeping trazodone will be ordered.  Genetic buccal swab kit will be sent when she sends her address.

## 2025-02-26 NOTE — PSYCH
Virtual Regular Visit    Patient is located at Home in the following state in which I hold an active license [place].    Assessment & Plan              Reason for visit is No chief complaint on file.       Visit Time  Visit Start Time: ***  Visit Stop Time: ***  Total Visit Duration: *** minutes    Encounter provider: Marcia Gross, PhD  Provider located at [facility]  [address]  [address]  [facility] [address] [phone]    Recent Visits  No visits were found meeting these conditions.  Showing recent visits within past 7 days and meeting all other requirements  Future Appointments  No visits were found meeting these conditions.  Showing future appointments within next 150 days and meeting all other requirements       The patient was identified by name and date of birth. Ruthy Farley was informed that this is a telemedicine visit and that the visit is being conducted through {AMB CORONAVIRUS VISIT MEDIUM:45499} My office door was closed. No one else was in the room. She acknowledged consent and understanding of privacy and security of the video platform. The patient has agreed to participate and understands they can discontinue the visit at any time. Patient is aware this is a billable service.     MEDICATION MANAGEMENT NOTE        [facility]      Name and Date of Birth:  Ruthy Farley 24 y.o. 2000 MRN: [id number]    Date of Visit: February 26, 2025         - Educated about healthy life style, risk of falls/sedation and addiction. Patient was receptive to education.  - Medications sent to the patient's pharmacy for 90 day supply    - RTC in *** weeks  - The patient was educated about 24 hour and weekend coverage for urgent situations accessed by calling [facility] main practice number  - Patient was educated to call National Suicide Prevention  Bass Manager ([phone] [[phone]]) for behavioral crisis at anytime or 911 for any safety concerns, or go to nearest ER if the symptoms become overwhelming or unmanageable.         Subjective        Ruthy Farley is a 24 y.o. female, visited for No chief complaint on file., who was virtually seen and evaluated today at the [facility] outpatient clinic for follow-up and medication management. Completes psychiatric assessment without difficulty.     At previous outpatient psychiatric appointment with this writer, ***.   She denies any current adverse medication side effects.      Ruthy            ***      Review Of Systems:  Pertinent items are noted in HPI; all others are negative; no recent changes in medications or health status reported.    PHQ-2/9 Depression Screening                 Historical Information        Past Medical History:    Past Medical History:   Diagnosis Date   • Anxiety    • Depression    • Obsessive-compulsive disorder    • Sleep difficulties         Past Surgical History:   Procedure Laterality Date   • DENTAL SURGERY       No Known Allergies    Substance Abuse History:    Social History     Substance and Sexual Activity   Alcohol Use Yes    Comment: socially     Social History     Substance and Sexual Activity   Drug Use Not Currently       Social History:    Social History     Socioeconomic History   • Marital status: Single     Spouse name: Not on file   • Number of children: Not on file   • Years of education: Not on file   • Highest education level: Not on file   Occupational History   • Occupation: Healthcare Coordinator   Tobacco Use   • Smoking status: Former     Current packs/day: 0.00     Types: Cigarettes     Quit date: 02/2022     Years since quitting: 3.0   • Smokeless tobacco: Never   Vaping Use   • Vaping status: Never Used   Substance and Sexual Activity   • Alcohol use: Yes     Comment: socially   • Drug use: Not Currently   • Sexual activity:  Yes     Partners: Female     Birth control/protection: None   Other Topics Concern   • Not on file   Social History Narrative   • Not on file     Social Drivers of Health     Financial Resource Strain: Low Risk  (12/5/2022)    Overall Financial Resource Strain (CARDIA)    • Difficulty of Paying Living Expenses: Not hard at all   Food Insecurity: No Food Insecurity (12/5/2022)    Hunger Vital Sign    • Worried About Running Out of Food in the Last Year: Never true    • Ran Out of Food in the Last Year: Never true   Transportation Needs: No Transportation Needs (12/5/2022)    PRAPARE - Transportation    • Lack of Transportation (Medical): No    • Lack of Transportation (Non-Medical): No   Physical Activity: Not on file   Stress: No Stress Concern Present (12/5/2022)    [employer] [place] [employer] - Occupational Stress Questionnaire    • Feeling of Stress : Not at all   Social Connections: Moderately Isolated (10/4/2022)    Received from [facility], [facility]    Social Connection and Isolation Panel [NHANES]    • Frequency of Communication with Friends and Family: Three times a week    • Frequency of Social Gatherings with Friends and Family: More than three times a week    • Attends Gnosticist Services: Never    • Active Member of Clubs or Organizations: No    • Attends Club or Organization Meetings: Never    • Marital Status:    Intimate Partner Violence: Not At Risk (12/5/2022)    Humiliation, Afraid, Rape, and Kick questionnaire    • Fear of Current or Ex-Partner: No    • Emotionally Abused: No    • Physically Abused: No    • Sexually Abused: No   Housing Stability: Low Risk  (12/5/2022)    Housing Stability Vital Sign    • Unable to Pay for Housing in the Last Year: No    • Number of Places Lived in the Last Year: 2    • Unstable Housing in the Last Year: No       Family Psychiatric History:     Family History   Problem Relation Age of Onset   • Diabetes  Father    • No Known Problems Brother    • Breast cancer Neg Hx    • Colon cancer Neg Hx    • Ovarian cancer Neg Hx        History Review: The following portions of the patient's history were reviewed and updated as appropriate: allergies, current medications, past family history, past medical history, past social history, past surgical history and problem list.           Objective      Vital signs in last 24 hours:  There were no vitals filed for this visit.    Mental Status Evaluation:    Appearance age appropriate, casually dressed   Behavior cooperative, calm   Speech normal rate, normal volume, normal pitch   Mood {EFO EXAM; MOOD LESS/MORE:56279}   Affect normal range and intensity, appropriate   Thought Processes organized, goal directed   Associations intact associations   Thought Content no overt delusions   Perceptual Disturbances: no auditory hallucinations, no visual hallucinations   Abnormal Thoughts  Risk Potential Suicidal ideation - None  Homicidal ideation - None  Potential for aggression - No   Orientation oriented to: person, place, time/date, and situation   Memory recent and remote memory grossly intact   Consciousness alert and awake   Attention Span Concentration Span attention span and concentration are age appropriate   Intellect appears to be of average intelligence   Insight intact   Judgement intact   Muscle Strength and  Gait unable to assess today due to virtual visit   Motor activity unable to assess today due to virtual visit   Language no difficulty naming common objects, no difficulty repeating a phrase   Fund of Knowledge adequate knowledge of current events  adequate fund of knowledge regarding past history  adequate fund of knowledge regarding vocabulary    Pain none   Pain Scale 0     Laboratory Results: I have personally reviewed all pertinent laboratory/tests results  {AL AMB Labs (Optional):12213}            Current Outpatient Medications   Medication Sig Dispense Refill   •  CALCIUM PO Take by mouth Taking a Calcium/ magnesium and Zinc     • clonazePAM (KlonoPIN) 0.5 mg tablet Take 1 tablet (0.5 mg total) by mouth 2 (two) times a day as needed for anxiety 60 tablet 0   • DULoxetine (CYMBALTA) 20 mg capsule TAKE ONE CAPSULE BY MOUTH THREE TIMES A DAY 90 capsule 2   • ergocalciferol (VITAMIN D2) 50,000 units TAKE ONE TABLET BY MOUTH ONCE A WEEK 12 capsule 0   • Galcanezumab-gnlm 120 MG/ML SOAJ Inject 1 mL under the skin every 30 (thirty) days Begin 30 days after loading dose 1 mL 11   • Sodium Fluoride 5000 PPM 1.1 % PSTE BRUSH THOROUGHLY ONCE DAILY PREFERABLY AT BEDTIME FOR 2 MINUTES, EXPECTORATE AFTER USE, DO NOT RINSE.     • vitamin B-12 (VITAMIN B-12) 1,000 mcg tablet TAKE ONE TABLET BY MOUTH EVERY DAY 30 tablet 3     No current facility-administered medications for this visit.         Medications Risks/Benefits      Risks, Benefits And Possible Side Effects Of Medications:    {EFO AMB RISKS/BENEFITS MEDICATIONS:45890}    Controlled Medication Discussion:     {AL AMB PRYCH CONTROLLED MED DISCUSSION:40413}    Psychotherapy Provided:     Individual psychotherapy provided: No   Psychoeducation provided to the patient and was educated about the importance of compliance with the medications and psychiatric treatment  Supportive psychotherapy provided to the patient  Solution Focused Brief Therapy (SFBT) provided  Patient's emotions were validated and specific labeled praise provided.   Loraine suggestions were offered in a supportive non-critical way.     Treatment Plan:    Completed and signed during the session: {EFO Treatment Plan Session:73510}    Note Share    This note was not shared with the patient due to {Reason why note was not shared:[id number]}    Marcia Gross, PhD 02/26/25    This note was completed in part utilizing Dragon dictation Software. Grammatical, translation, syntax errors, random word insertions, spelling mistakes, and incomplete sentences may be an occasional  consequence of this system secondary to software limitations with voice recognition, ambient noise, and hardware issues. If you have any questions or concerns about the content, text, or information contained within the body of this dictation, please contact the provider for clarification.

## 2025-02-26 NOTE — ASSESSMENT & PLAN NOTE
Cymbalta 30 mg 3 times a day is effective to help reduce mixed obsessional thoughts and acts.  Occasionally will take Klonopin and is effective

## 2025-02-26 NOTE — PATIENT INSTRUCTIONS
Continue medications  Call with problems or concerns  Trazodone 50 mg, start 1/2 tab and if this is not effective take a full tablet

## 2025-02-26 NOTE — PSYCH
Virtual Regular Visit    Patient is located at Home in the following state in which I hold an active license [place].    Assessment & Plan  Mixed obsessional thoughts and acts  Cymbalta 30 mg 3 times a day is effective to help reduce mixed obsessional thoughts and acts.  Occasionally will take Klonopin and is effective       JAEL (generalized anxiety disorder)  Continues with anxiety uses clonidine which is effective she has difficulty sleeping and trazodone was ordered at 50 mg.       Severe episode of recurrent major depressive disorder, without psychotic features (HCC)  Denies depression or suicidal ideation Cymbalta has been effective in controlling her depression.  She is upset due to finding out her mother who is in [place] has stage III lung cancer.  Support was provided to her.                   Reason for visit is   Chief Complaint   Patient presents with    Anxiety    Insomnia    Medication Management    Depression        Visit Time  Visit Start Time: 12:00  Visit Stop Time: 12:30  Total Visit Duration: 30 minutes    Encounter provider: Marcia Gross, PhD  Provider located at [address]  [facility] [address] [phone]    Recent Visits  No visits were found meeting these conditions.  Showing recent visits within past 7 days and meeting all other requirements  Today's Visits  Date Type Provider Dept   02/26/25 Telemedicine Marcia Gross, PhD [facility]   Showing today's visits and meeting all other requirements  Future Appointments  No visits were found meeting these conditions.  Showing future appointments within next 150 days and meeting all other requirements       The patient was identified by name and date of birth. Ruthy Miguelito was informed that this is a telemedicine visit and that the visit is being conducted through the Epic Embedded platform. She agrees to proceed. My office  door was closed. No one else was in the room. She acknowledged consent and understanding of privacy and security of the video platform. The patient has agreed to participate and understands they can discontinue the visit at any time. Patient is aware this is a billable service.     MEDICATION MANAGEMENT NOTE        [facility]      Name and Date of Birth:  Ruthy Farley 24 y.o. 2000 MRN: [id number]    Date of Visit: February 26, 2025         - Educated about healthy life style, risk of falls/sedation and addiction. Patient was receptive to education.  - Medications sent to the patient's pharmacy for 90 day supply    - RTC in 12 weeks  - The patient was educated about 24 hour and weekend coverage for urgent situations accessed by calling [facility] main practice number  - Patient was educated to call [employer] [place] Suicide Prevention Lifeline (4-165-599-TALK [[phone]]) for behavioral crisis at anytime or 911 for any safety concerns, or go to nearest ER if the symptoms become overwhelming or unmanageable.         Subjective        Ruthy Farley is a 24 y.o. female, visited for Anxiety, Insomnia, Medication Management, and Depression, who was virtually seen and evaluated today at the [facility] outpatient clinic for follow-up and medication management. Completes psychiatric assessment without difficulty.     At previous outpatient psychiatric appointment with this writer, started Vraylar for mood and anxiety, she experienced side effects and it was stopped.   She denies any current adverse medication side effects.  Overall Ruthy  continues to have anxiety and difficulty sleeping. Recently moving to [place]. Will have clinical staff call patient and let her know I can not conduct future calls in [place]. She will have to come to the appointment or change providers.     Review Of Systems:  Pertinent items are  noted in HPI; all others are negative; no recent changes in medications or health status reported.    PHQ-2/9 Depression Screening                 Historical Information        Past Medical History:    Past Medical History:   Diagnosis Date    Anxiety     Depression     Obsessive-compulsive disorder     Sleep difficulties         Past Surgical History:   Procedure Laterality Date    DENTAL SURGERY       No Known Allergies    Substance Abuse History:    Social History     Substance and Sexual Activity   Alcohol Use Yes    Comment: socially     Social History     Substance and Sexual Activity   Drug Use Not Currently       Social History:    Social History     Socioeconomic History    Marital status: Single     Spouse name: Not on file    Number of children: Not on file    Years of education: Not on file    Highest education level: Not on file   Occupational History    Occupation: Healthcare Coordinator   Tobacco Use    Smoking status: Former     Current packs/day: 0.00     Types: Cigarettes     Quit date: 02/2022     Years since quitting: 3.0    Smokeless tobacco: Never   Vaping Use    Vaping status: Never Used   Substance and Sexual Activity    Alcohol use: Yes     Comment: socially    Drug use: Not Currently    Sexual activity: Yes     Partners: Female     Birth control/protection: None   Other Topics Concern    Not on file   Social History Narrative    Not on file     Social Drivers of Health     Financial Resource Strain: Low Risk  (12/5/2022)    Overall Financial Resource Strain (CARDIA)     Difficulty of Paying Living Expenses: Not hard at all   Food Insecurity: No Food Insecurity (12/5/2022)    Hunger Vital Sign     Worried About Running Out of Food in the Last Year: Never true     Ran Out of Food in the Last Year: Never true   Transportation Needs: No Transportation Needs (12/5/2022)    PRAPARE - Transportation     Lack of Transportation (Medical): No     Lack of Transportation (Non-Medical): No   Physical  Activity: Not on file   Stress: No Stress Concern Present (12/5/2022)    [employer] - Occupational Stress Questionnaire     Feeling of Stress : Not at all   Social Connections: Moderately Isolated (10/4/2022)    [facility], [facility]    Social Connection and Isolation Panel [NHANES]     Frequency of Communication with Friends and Family: Three times a week     Frequency of Social Gatherings with Friends and Family: More than three times a week     Attends Hindu Services: Never     Active Member of Clubs or Organizations: No     Attends Club or Organization Meetings: Never     Marital Status:    Intimate Partner Violence: Not At Risk (12/5/2022)    Humiliation, Afraid, Rape, and Kick questionnaire     Fear of Current or Ex-Partner: No     Emotionally Abused: No     Physically Abused: No     Sexually Abused: No   Housing Stability: Low Risk  (12/5/2022)    Housing Stability Vital Sign     Unable to Pay for Housing in the Last Year: No     Number of Places Lived in the Last Year: 2     Unstable Housing in the Last Year: No       Family Psychiatric History:     Family History   Problem Relation Age of Onset    Diabetes Father     No Known Problems Brother     Breast cancer Neg Hx     Colon cancer Neg Hx     Ovarian cancer Neg Hx        History Review: The following portions of the patient's history were reviewed and updated as appropriate: allergies, current medications, past family history, past medical history, past social history, past surgical history and problem list.           Objective      Vital signs in last 24 hours:  There were no vitals filed for this visit.    Mental Status Evaluation:    Appearance age appropriate, casually dressed   Behavior cooperative, calm   Speech normal rate, normal volume, normal pitch   Mood anxious   Affect normal range and intensity, appropriate   Thought Processes organized, goal directed    Associations intact associations   Thought Content no overt delusions   Perceptual Disturbances: no auditory hallucinations, no visual hallucinations   Abnormal Thoughts  Risk Potential Suicidal ideation - None  Homicidal ideation - None  Potential for aggression - No   Orientation oriented to: person, place, time/date, and situation   Memory recent and remote memory grossly intact   Consciousness alert and awake   Attention Span Concentration Span attention span and concentration are age appropriate   Intellect appears to be of average intelligence   Insight intact   Judgement intact   Muscle Strength and  Gait unable to assess today due to virtual visit   Motor activity unable to assess today due to virtual visit   Language no difficulty naming common objects, no difficulty repeating a phrase   Fund of Knowledge adequate knowledge of current events  adequate fund of knowledge regarding past history  adequate fund of knowledge regarding vocabulary    Pain none   Pain Scale 0     Laboratory Results: I have personally reviewed all pertinent laboratory/tests results  Recent Labs (last 9 months):   Office Visit on 12/11/2024   Component Date Value    SURESWAB(R) ADV BACTERIA* 12/11/2024 NEGATIVE     Candida Species 12/11/2024 NOT DETECTED     ZOIE GLABRATA 12/11/2024 NOT DETECTED     TRICHOMONAS VAGINALIS (T* 12/11/2024 NOT DETECTED     Chlamydia Trachomatis RN* 12/11/2024 NOT DETECTED     Neisseria Gonorrhoeae RN* 12/11/2024 NOT DETECTED    Appointment on 11/04/2024   Component Date Value    MEASLES AB (IGG), DIAGNO* 11/04/2024 57.4     Mumps Ab, IgG 11/04/2024 78.4     Rubella Antibody (IgG) D* 11/04/2024 2.52     Varicella IgG 11/04/2024 IMMUNE     Cholesterol 11/04/2024 171     Triglycerides 11/04/2024 160 (H)     HDL, Direct 11/04/2024 36 (L)     LDL Calculated 11/04/2024 103 (H)     TSH 3RD GENERATON 11/04/2024 1.270     Hep B S Ab 11/04/2024 8.53     THYROID MICROSOMAL ANTIB* 11/04/2024 <9     Thyroglobulin  Ab 11/04/2024 <1.0    Annual Exam on 09/23/2024   Component Date Value    Case Report 09/23/2024                      Value:Gynecologic Cytology Report                       Case: [id number]                                  Authorizing Provider:  Chio Olson PA-C  Collected:           09/23/2024 1203              Ordering Location:     [facility]    Received:            09/23/2024 1203                                     [facility]                                                             First Screen:          Chanelle Lazaro                                                            Specimen:    LIQUID-BASED PAP, SCREENING, Cervix                                                        Primary Interpretation 09/23/2024 Negative for intraepithelial lesion or malignancy     Specimen Adequacy 09/23/2024 Satisfactory for evaluation. Endocervical/transformation zone component present.     Additional Information 09/23/2024                      Value:[employer]'s FDA approved ,  and ThinPrep Imaging Duo System are utilized with strict adherence to the 's instruction manual to prepare gynecologic and non-gynecologic cytology specimens for the production of ThinPrep slides as well as for gynecologic ThinPrep imaging. These processes have been validated by our laboratory and/or by the .  The Pap test is not a diagnostic procedure and should not be used as the sole means to detect cervical cancer. It is only a screening procedure to aid in the detection of cervical cancer and its precursors. Both false-negative and false-positive results have been experienced. Your patient's test result should be interpreted in this context together with the history and clinical findings.      Gross Description 09/23/2024                      Value:20 ml , colorless, cloudy received in a ThinPrep vial.      Trichomonas vaginalis 09/23/2024 Not Detected     Gardnerella vaginalis  09/23/2024 Detected (A)     Georgette Species 09/23/2024 Not Detected    Appointment on 06/28/2024   Component Date Value    Vitamin B-12 06/28/2024 347     WBC 06/28/2024 11.74 (H)     RBC 06/28/2024 5.05     Hemoglobin 06/28/2024 12.7     Hematocrit 06/28/2024 41.3     MCV 06/28/2024 82     MCH 06/28/2024 25.1 (L)     MCHC 06/28/2024 30.8 (L)     RDW 06/28/2024 15.2 (H)     MPV 06/28/2024 9.5     Platelets 06/28/2024 339     nRBC 06/28/2024 0     Segmented % 06/28/2024 67     Immature Grans % 06/28/2024 0     Lymphocytes % 06/28/2024 23     Monocytes % 06/28/2024 7     Eosinophils Relative 06/28/2024 2     Basophils Relative 06/28/2024 1     Absolute Neutrophils 06/28/2024 7.86 (H)     Absolute Immature Grans 06/28/2024 0.05     Absolute Lymphocytes 06/28/2024 2.75     Absolute Monocytes 06/28/2024 0.81     Eosinophils Absolute 06/28/2024 0.18     Basophils Absolute 06/28/2024 0.09                Current Outpatient Medications   Medication Sig Dispense Refill    CALCIUM PO Take by mouth Taking a Calcium/ magnesium and Zinc      clonazePAM (KlonoPIN) 0.5 mg tablet Take 1 tablet (0.5 mg total) by mouth 2 (two) times a day as needed for anxiety 60 tablet 0    DULoxetine (CYMBALTA) 20 mg capsule TAKE ONE CAPSULE BY MOUTH THREE TIMES A DAY 90 capsule 2    ergocalciferol (VITAMIN D2) 50,000 units TAKE ONE TABLET BY MOUTH ONCE A WEEK 12 capsule 0    Galcanezumab-gnlm 120 MG/ML SOAJ Inject 1 mL under the skin every 30 (thirty) days Begin 30 days after loading dose 1 mL 11    Sodium Fluoride 5000 PPM 1.1 % PSTE BRUSH THOROUGHLY ONCE DAILY PREFERABLY AT BEDTIME FOR 2 MINUTES, EXPECTORATE AFTER USE, DO NOT RINSE.      vitamin B-12 (VITAMIN B-12) 1,000 mcg tablet TAKE ONE TABLET BY MOUTH EVERY DAY 30 tablet 3     No current facility-administered medications for this visit.         Medications Risks/Benefits      Risks, Benefits And Possible Side Effects Of Medications:    Side effects denied after stopping Vraylar    Controlled  Medication Discussion:     She is aware of safe use and storage of medication    Psychotherapy Provided:     Individual psychotherapy provided: No   Psychoeducation provided to the patient and was educated about the importance of compliance with the medications and psychiatric treatment  Supportive psychotherapy provided to the patient  Solution Focused Brief Therapy (SFBT) provided  Patient's emotions were validated and specific labeled praise provided.   [place] suggestions were offered in a supportive non-critical way.   Hussein Sen safety plan was updated  Treatment Plan:    Completed and signed during the session: Yes - Treatment Plan done but not signed at time of office visit due to:  Plan reviewed by video and verbal consent given due to virtual visit. Treatment Plan sent to patient via [employer] for signature.    Note Share    This note was not shared with the patient due to this is a psychotherapy note    Marcia Gross, PhD 02/26/25    This note was completed in part utilizing Dragon dictation Software. Grammatical, translation, syntax errors, random word insertions, spelling mistakes, and incomplete sentences may be an occasional consequence of this system secondary to software limitations with voice recognition, ambient noise, and hardware issues. If you have any questions or concerns about the content, text, or information contained within the body of this dictation, please contact the provider for clarification.

## 2025-02-26 NOTE — BH TREATMENT PLAN
TREATMENT PLAN (Medication Management Only)         [facility]     Name and Date of Birth:  Ruthy Farley 24 y.o. 2000  Date of Treatment Plan: March 25, 2024, September 26, 2024 February 26, 2025  Diagnosis/Diagnoses:    1. Mixed obsessional thoughts and acts    2. Severe episode of recurrent major depressive disorder, without psychotic features (HCC)    3. Generalized anxiety disorder    4. Chronic nonintractable headache, unspecified headache type    5. High risk medication use       Strengths/Personal Resources for Self-Care: supportive family, taking medications as prescribed, ability to communicate needs, ability to listen.  Area/Areas of need (in own words): anxiety, depression, obsessive-compulsive symptoms  1.Long Term Goal: improve control of obsessive thoughts and compulsions  Target Date:6 months -8/26/2025  Person/Persons responsible for completion of goal: Ruthy, provider and therapist, family  2.         Short Term Objective (s) - How will we reach this goal?:   A. Provider new recommended medication/dosage changes and/or continue medication(s): continue current medications as prescribed.  B.  Therapy .  C.  Self care adequate sleep .  Target Date:6 months -8/26/2025  Person/Persons Responsible for Completion of Goal: Ruthy, provider and therapist, family  Progress Towards Goals: initiating treatment  Treatment Modality: medication management every 3 months  Review due 180 days from date of this plan: 6 months -8/26/2025  Expected length of service:  Ongoing  My Physician/PA/NP and I have developed this plan together and I agree to work on the goals and objectives. I

## 2025-02-26 NOTE — ASSESSMENT & PLAN NOTE
Denies depression or suicidal ideation Cymbalta has been effective in controlling her depression.  She is upset due to finding out her mother who is in [place] has stage III lung cancer.  Support was provided to her.

## 2025-02-26 NOTE — TELEPHONE ENCOUNTER
Called [facility] regarding the pharmacy she provided.  She said she does not have to use [facility] and she provided updated information for [facility], which was entered into her chart. Also informed her that since she is moving out of the state of [place], she would either have to present in person for her appointments with this provider or she would need to transfer her care to another provider.  Ruthy said she would have to transfer her care due the distance.  She also asked about wether she should still pursue [employer] testing and I told her I would defer decision to provider.  Informed her provider can still fill her scripts temporarily pending [facility].    Will refer to Dr Gross for review.

## 2025-03-03 DIAGNOSIS — E55.9 VITAMIN D INSUFFICIENCY: ICD-10-CM

## 2025-03-04 ENCOUNTER — CLINICAL SUPPORT (OUTPATIENT)
Dept: NUTRITION | Facility: HOSPITAL | Age: 25
End: 2025-03-04
Payer: COMMERCIAL

## 2025-03-04 PROCEDURE — 97803 MED NUTRITION INDIV SUBSEQ: CPT

## 2025-03-04 NOTE — PROGRESS NOTES
" Nutrition Assessment Form    Patient Name: Ruthy Farley    YOB: 2000    Sex: Female     Assessment Date: 3/4/2025  Start Time: 10:40 am Stop Time: 11:10 pm Total Minutes: 30     Data:  Present at session: self   Parent/Patient Concerns/reason for visit:  \"I moved since we last spoke\"   Medical Dx/Reason for Referral: LIFESTYLE NUTRITION-seen at [facility] as OP MNT     E78.1 (ICD-10-CM) - Hypertriglyceridemia  Provider: Teresa Montana DO   Referred d/t hx of ED/DE   Past Medical History:   Diagnosis Date    Anxiety     Depression     Obsessive-compulsive disorder     Sleep difficulties        Current Outpatient Medications   Medication Sig Dispense Refill    CALCIUM PO Take by mouth Taking a Calcium/ magnesium and Zinc      clonazePAM (KlonoPIN) 0.5 mg tablet Take 1 tablet (0.5 mg total) by mouth 2 (two) times a day as needed for anxiety 60 tablet 0    DULoxetine (CYMBALTA) 20 mg capsule TAKE ONE CAPSULE BY MOUTH THREE TIMES A DAY 90 capsule 2    ergocalciferol (VITAMIN D2) 50,000 units TAKE ONE TABLET BY MOUTH ONCE A WEEK 12 capsule 0    Galcanezumab-gnlm 120 MG/ML SOAJ Inject 1 mL under the skin every 30 (thirty) days Begin 30 days after loading dose 1 mL 11    Sodium Fluoride 5000 PPM 1.1 % PSTE BRUSH THOROUGHLY ONCE DAILY PREFERABLY AT BEDTIME FOR 2 MINUTES, EXPECTORATE AFTER USE, DO NOT RINSE.      traZODone (DESYREL) 50 mg tablet Take 1 tablet (50 mg total) by mouth daily at bedtime 30 tablet 3    traZODone (DESYREL) 50 mg tablet Take 1 tablet (50 mg total) by mouth daily at bedtime as needed for sleep 30 tablet 3    vitamin B-12 (VITAMIN B-12) 1,000 mcg tablet TAKE ONE TABLET BY MOUTH EVERY DAY 30 tablet 3     No current facility-administered medications for this visit.        Additional Meds/Supplements: N/A   Special Learning Needs/barriers to learning/any new barriers N/A   Height: 5'3\" Ht Readings from Last 5 Encounters:   12/11/24 5' 3\" (1.6 m)   12/09/24 5' 3\" (1.6 m) " "  10/15/24 5' 3\" (1.6 m)   09/23/24 5' 3\" (1.6 m)   09/23/24 5' 3\" (1.6 m)      Weight:  232# Wt Readings from Last 10 Encounters:   12/11/24 105 kg (232 lb 3.2 oz)   12/09/24 105 kg (232 lb)   10/15/24 108 kg (238 lb 1.6 oz)   09/23/24 108 kg (238 lb 9.6 oz)   09/23/24 108 kg (238 lb)   08/15/24 109 kg (240 lb)   08/08/24 109 kg (240 lb)   07/22/24 110 kg (242 lb)   06/26/24 109 kg (241 lb 3.2 oz)   05/16/24 112 kg (246 lb 6.4 oz)     Estimated body mass index is 41.13 kg/m² as calculated from the following:    Height as of 12/11/24: 5' 3\" (1.6 m).    Weight as of 12/11/24: 105 kg (232 lb 3.2 oz).   Recent Weight Change: [x]Yes     []No  Amount:  -14# x 10 mo      Energy Needs: Did not calculate macros at this time   No Known Allergies or intolerances NKFA   Social History     Substance and Sexual Activity   Alcohol Use Yes    Comment: socially    1-2x/month   Social History     Tobacco Use   Smoking Status Former    Current packs/day: 0.00    Types: Cigarettes    Quit date: 02/2022    Years since quitting: 3.0   Smokeless Tobacco Never    N/A   Who shops? patient   Who cooks/cooking methods/Eating out/take out habits   patient     Exercise: Goes to the gym ~ 3 days/week Pt has had this gym routine for the last 3-4 months.     Other: ie: Sleep habits/ stress level/ work habits household-lives with ?/ food security Good sleep   Prior Nutritional Counseling? [x]Yes     []No  When:  12/09/24     Why:  Follow up with this writer.        Diet Hx: 03/04/25-meals have been off d/t moving  Breakfast: 10-11 am Scrambled eggs (2) + shredded cheese + small avocado + high fiber toast  Or   Frozen wontons   Lunch: 1pm Leftovers from dinner   Or  Spring mix salad with healthy toppings  Or  On gym days--banana and yogurt,          Dinner: 6-7 pm 1 filet of salmon + rice & kepie singh + cucumber  Or  [place] + avocado + potato egg salad  Or  Chicken + rice/pasta + sometimes a vegetable     Snacks: Often something sweet.   Other " "Notes/ Initial Assessment:    The patient was identified by name and date of birth. Ruthy was informed that this is a telemedicine visit and that the visit is being conducted through [employer]. She agrees to proceed..  My office door was closed. No one else was in the room.  She acknowledged consent and understanding of privacy and security of the video platform. The patient has agreed to participate and understands they can discontinue the visit at any time. Patient is aware this is a billable service.       03/04/25  Moved to [facility] 02/22/25.  Recently prescribed Trazodone (started taking 4-5 days) to help with sleep.    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    Going to the gym consistently, states it brings her bladimir and helps her feel more in control of her body. Cardio 15-20 min then strength training, ~ 3x/week.    Last appt it was discussed that pt wanted to try and lose wt but after about 1-2 weeks pt realized it didn't make her feel good-felt she had tried to make more drastic changes: eating in a kcal deficit, not buying snacks, buying very low calorie foods. Felt herself starting to back to disordered eating thoughts that she used to have.     States her omm recently got dianosed with cancer and has a new outlook on the importance of overall health.     Weighs herself once a week at the gym, helps her feel more in control and good about herself.     Fluids: still working on, feels adequate intake at this time.     Since moving: finding it hard to work in 3 meals. Definitely gets in a routine/balanced dinner. Intake still often depends on what she is feeling, time management.  Relies onpublic transportation to go to and from the gym and going to and from is more time consuming than it used to be. Sometimes will eat something small before the the gym and then be super hungry when coming home and not make the best choices.     Pt admits to feeling a little \"out of control\" with their " "routine/schedule being variable d/t the move and pt not working yet.   Admits to sometimes having difficulty planning even when she has the time if she doesn't have a routine.  Suggested grouping routine into gym days and non gym days and working on getting a meal schedule set up for one group first and then the other.  Suggested giving herself until the end of this month for things to be \"variable\" with meals/eating and using this time to trial things out to see what works for pt and her wife.    F/U 04/03/25  _____________________________________________________________________________________________________________________________________________________________  12/09/24  *Lifestyle nutrition appointment seen at 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 She and her wife are planning to start looking for work-preferable in [place] or if not [place] but no set plans in mind.      2-3 months ago pt decided to try and lose wt. Her eating and workouts have changed as a result. Pt stated she started this right around the last appointment but forgot to mention it.  Feels having a schedule with her internship has been helping with making the changes to her diet/activity and with not skipping meals and with planning.     Working out 5 days a week instead of 3 days a week. Cardio + sometime of strength training or core strengthening.  Heaviest is 248#, now she is 232#.  Still trying to only eat a sweet treat after dinner but that can sometimes be a smoothie or a yasso yogurt bar. Pt still is having cravings for sweets but so far has been able to find healthier alternatives. She has increased her vegetable intake-feels good about this.  Fluid intake goes up and down, currently working on drinking more, still mainly water.      Pt is trying to weigh herself only once a week, sometimes struggling if she sees a 1-2# wt gain. Pt will talk to her wife about this.  Pt states her wife is " always supportive of pt but is still a little apprehensive with pt going back to restrictive/obsessive eating, Suggested pt and wife take some time out once per week to touch base on eachothers feelings about this situation-pt likes this idea.       Pt was thinking a lot about losing wt and while she was trying to accept herself for who she is, because she was thinking about it so much and was feeling bad about her body and how clothes fit.  Pt now is in a better place to be able to make some changes and feels she is in a better head space.  Pt is not calorie counting, feels that will be triggering for her.  This writer sent over [employer] meal plan handout which gives examples of balanced meals and snacks (ex: lunch may be 3 protein, 1 fat, 2-3 CHO, 2 vegetable) and there is info included on what 1 unit from each group looks like. Pt feels this may be helpful to help be a guide for balance and portions.      Pt finds herself getting thrown off when they go out or have an event-often finding herself scrambling around with what to make the next day or so. Discussed if has something planned to perp for the day after the event or start to work on building up freezer meals.      Follow up scheduled 02/13/25 --still job hunting? Moving?  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  Pt is also moving in March to [place]. Pt plans to try to mealprep lunch-plans to stick with salads sine they are currently working for her.      Pt going to the  4 days a week other than this week-pt is feeling a little sick.  Pt has been trying to also go to the gym in the morning before wife has to go to work but having a harder time to go to bed-has a routine but if strays even from the routine a little she feels like she has failed.    "  Making baklava bites that use dates as the sweetener or just regular dates as a sweet treat. Before the gym has been eating sugar free raspberry jam with cottage cheese.      Eating eggs less often now, has been eating salad for lunch-no dressing, some cheese and avocado, some croutons (small handful), a protein,  Still trying to eat a lot of vegetables for dinner.-lately it has been cauliflower and spinach, brussels sprouts.      Mon, tues, wed eats a salmon filet or another fish & has poultry the other days. Lately has often been having a regular or sweet potato as the starch. Pt is incorporation of avocado     Pt has been struggling with drinking as much water as she had been-more so has been forgetting. Having ~ 1 can of seltzer.     Pt is looking for lower-fat items deli meats, shredded cheese, fat free cottage cheese.      Pt feels sometimes she gets in her head and it comes and goes in was with being ok with having a balance with her eating and exercising and other ties if she doesn't do it perfectly  then all Is lost and loses motivation. Pt also notes has wife is concerned pt sometimes may be too focused on \"diet culture\" with some comments such as:  Pt is finding a hard time balancing doing things for her health vs getting back into restrictive eating. Pt plans to have a conversation with her wife to help reassure her that her mindset is for health not disordered eating. Pt is also trying to limit screen time.      Pt would like to build up an arsenal of recipes-discussed tips.      Follow up scheduled 12/10/24 Lifestyle nutrition pt  __________________________________________________________________________________________________  09/06/24  Appointment started late, pt thought it was at 12:30 when it was in fact @ 11:30 am. This writer was able to accommodate for this today.  Pt had an appointment 09/24 but wished to have one sooner to talk through some issues.    Pt has been trying to do something " "other than eggs 3x/week such as oatmeal or yogurt but hasn't been doing well.  States she is now repulsed by egg whites, now has 2 regular eggs.  Trying to eat a vegetable-raw or cooked with every meal so sometimes has thiem with breakfast +  whole grain toast.  Pt knows though not wanting to increase her cholesterol meals not eating eggs every day/so often. Discussed some tips including trying leftovers, maybe homemade pancakes and she can put in different mix ins such as fruit/nuts-discussed portions for high kcal foods like nuts.    Has been trying to reduce eating sweets but only eating them after dinner but lately has been getting more cravings for sweets around 1-3 pm.  Pt has been going to bed and getting up earlier, now has a more balanced lunch. Suggested pt try to have a fruit or a \"mock\" dessert with or after lunch to see if htat fills the craving for something sweet.  Pt is open to this but admits to struggling with thinking of ideas and sometimes motivation. Suggested looking at the apps [employer] [employer] or [employer] for some ideas--today suggested energy bites, homemade granola bars, fruit dippend in a little melted chocolate, topping fruit with cool-whip or whippped cream, ricecakes with a little peanut aidee or nutella and fruit.     Pt feels she is doing better with water but struggles remembering-thinks she will get a large water bottle and fill it with what she should get in a day (discussed estimated needs). Pt is staying consistent with going ot the gym 3 days/week. Pt asked about when she should get her next lipid panel. Last one was 04/2024, discussed possibly 6 mo later so 10/2024. This writer reached out to pt's PCP who is on board with this.     F/U scheduled 10/15/24-Lifestyle nutrition appointment.     ___________________________________________________________________________________________________________________  08/06/24    Pt has met goal #1 for breakfast, also switched to low-fat cheese " "instead of full fat cheese.  As for goal #2 Pt has started to make a salad with red/white cabbage, other vegetables, beans--likes the way that this gets more vegetables and fiber, more balanced meals. She has been getting more interested and motivated to make meals where as she wasn't as much before.   Pt also states it makes her feel good about herself that she is meeting her goals.  When discussed continuing the goals or making new ones. This writer gave pt the ok if she wants to stay with the same goals if she feels she still has to work at meeting them... until they become more of a second nature habit. Pt stated she would like to do this also in line with helping her to find balance vs needing to do \"all of the things\" all at once.       Trying to limit sweets to 1 sweet treat at the end of the day and make some other changes but then felt overwhelmed and triggered (r/t disordered eating) so decided to go back to what had been discussed with sticking with only working on our previously set goals.    Pt has been consistent with going to the gym 3 days a week and proud of herself for sticking with it.  Pt also went to a restorative yoga class and liked it, felt it helped with mindfulness, this class is offered ~ once a month as far as pt knowsn.     Pt is working on figuring gout her hunger and full cues. Feels a hard time deciding if she wants to eat something because is hungry for it, if she is craving it, reviewed additional tips for mindfulness. Also suggested trying to have something healthier but sweet like fruit, low-fat yogurt either before eating her sweet treat or after.     Pt states since she is going to thr gym she is daydreaming less and has less need for the trampoline. She states she is also starting to look for a job.     F/U 09/24/24  ____________________________________________________________________________________________________________________________________________  07/08/24  [facility]" with wife who is a vegetarian and they have different tastes in food. Main proteins are chicken  Pt had an eating disorder from ~12 yo on and off until her 2nd year of college. Pt notes recent labs, especially lipid panel was high.  Pt feels like she has a good relationship with her body and her relationship with food but does want to slow wt gain and improve her labs but not falling back to disordered eating habits.  Pt's triglycerides increased over 100 from 10/23-04/24 hx an increase in ~20 for her cholesterol in the same time frame. Pt with family hx of DM and the last time her A1c was checked was 05/2023. This writer reached out to pt's referring dr about getting an order to have A1c checked-pt is also agreeable to this.         Hx of eating disorders: started around 13 years old. She and her friend started to focus on wanting to lose wt. States on 15th or 16th birthday she remembers being on a diet where she mainly ate buckwheat and then would binge eat the next week, buckwheat one week, binge the next. Around that time she realized she started missing her period and it was a shock to her of not wanting to mess with her health. She started to eat more balanced eating and incorporate more fats and her period came back. From ~ 16 years old until 2nd year of college there was some restriction, negative body image, felt there were a lot of foods she couldn't eat. Pt used to weigh herself daily but then threw away her scale and now only gets wt checked at Dr's appointments.  Did not get wt today, will consider at future in person appointment.     Pt then started to focus less on eating after her 2nd year of college but then noticed her wt was creeping up when she would get weighed at the drs office. Pt notes during that time she would daydream and often jumping a lot. Pt tried to work on day dreaming less d/t feeling it is embarrassing and then also stopped jumping-feels like that was a good source of activity.  Pt  "still sometimes thinks about daydreaming and they got a trampoline but states she has to be in the mindset to daydream and jump so at this time.    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 Pt was working but was recently laid off. Pt plans to start looking for work again after the summer. Pt's wife is currently working. Pt is home during the day.  Pt started working out to decompress after work. Goal is to work out to be healthy but still has the mindset of hyper fixating on burning more calories.  Pt feels in college she started to read more about body positivity and feminism and that was helpful for her mindset.     Fluids: since getting her labs back she is drinking 1-2 of her ~24-28 oz cup of water per day.  When its not that hot she will drink decaf black tea (1-4c/day), 1c seltzer per day.  Pt also notes eating more processed sugar snacks prior to her labs coming in. Now has been snacking on more fruit-often berries (fresh or frozen), or raw vegetables. Later on in the evening pt then will often eat more sweet things.     Pt states she isnt sure if she likes structure or if she doesn't. Right now she likes the idea of not having structure for most of her day but is ok with the idea of structuring some of her meals and feels that would be helpful. She would like to have some guidance on reducing foods that may be higher in cholesterol as well as increasing her vegetables. She would also like some guidance on balancing meals.  Sometime for later on may be \"decriminalizing\" sweets/desserts as well as finding some healthier alteratives for sweet foods.  Pt feels she is the type of person to have a larger portion of her favorite dessert less often vs having a smaller portion more often.     Follow up 08/06/24 Virtual, use email---ask about purging. If appointment is on a Tues/Thurs needs to be virtual d/t wife having the car, can come in person Monday or Friday.        Nutrition Diagnosis:   Altered Nutrition-Related " Laboratory values  related to hyper triglyceridemia as evidenced by  elevated triglycerides.       Any change or new dx since previous visit:     Nutrition Diagnosis:         Medical Nutrition Therapy Intervention:  [x]Individualized Meal Plan: did not focus on calories/macro [x]Understanding Lab Values: lipid panel, BG, A1c   []Basic Pathophysiology of Disease []Food/Medication Interactions   []Food Diary [x]Exercise: recommend 150 min/week   [x]Lifestyle/Behavior Modification Techniques: finding balance, HAES, reducing cholesterol, increasing vegetables []Medication, Mechanism of Action   []Label Reading: CHO/ Na/ Fat/ other_________ []Self Blood Glucose Monitoring   []Weight/BMI Goals: gain/lose/maintain N/A []Other -           Comprehension: []Excellent  [x]Very Good  []Good  []Fair   []Poor    Receptivity: []Excellent  [x]Very Good  []Good  []Fair   []Poor    Expected Compliance: []Excellent  [x]Very Good  []Good  []Fair   []Poor          Goals: 03/04/25   Prep lunch (new meal or make extra dinner for leftovers) the night before a work out day   2.    Continue with working out 3-4x/week   3.     Goals: 12/09/24  Aim to incorportate a vegetable with breakfast most days.   2.    Continue with working out 3-4x/wek   3.         Goals: 10/15/24  Continue to work out 4-5 days a week   2.    Prep food for the day or so after an event or make sure there are freezer meals to cover the day or two after to help prevent straying too far from healthy eating routine.      Goals: 09/06/24--  Every other time having eggs for breakfast make the following changeS: 3-4 egg whites (no whole eggs), add vegetables, substitute 1-2 pieces of higher fiber breakfast for breakfast meat   2. On the same says as having the altered egg white breakfast, for dinner: 1c starch/starchy vegetable, 1+ c non-starchy vegetable, 5-6oz lean protein   3.       Goals: 08/06/24--  Every other time having eggs for breakfast make the following changeS:  "3-4 egg whites (no whole eggs), add vegetables, substitute 1-2 pieces of higher fiber breakfast for breakfast meat   2. On the same says as having the altered egg white breakfast, for dinner: 1c starch/starchy vegetable, 1+ c non-starchy vegetable, 5-6oz lean protein   3.     Goals (initial): 07/08/24  Every other time having eggs for breakfast make the following changeS: 3-4 egg whites (no whole eggs), add vegetables, substitute 1-2 pieces of higher fiber bread for breakfast meat   2. On the same says as having the altered egg white breakfast, for dinner: 1c starch/starchy vegetable, 1+ c non-starchy vegetable, 5-6oz lean protein   3.     Labs:  CMP  Lab Results   Component Value Date    K 4.4 04/22/2024     04/22/2024    CO2 28 04/22/2024    BUN 12 04/22/2024    CREATININE 0.75 04/22/2024    GLUF 84 04/22/2024    CALCIUM 9.0 04/22/2024    AST 20 04/22/2024    ALT 19 04/22/2024    ALKPHOS 98 04/22/2024    EGFR 112 04/22/2024       BMP  Lab Results   Component Value Date    CALCIUM 9.0 04/22/2024    K 4.4 04/22/2024    CO2 28 04/22/2024     04/22/2024    BUN 12 04/22/2024    CREATININE 0.75 04/22/2024       Lipids  No results found for: \"CHOL\"  Lab Results   Component Value Date    HDL 36 (L) 11/04/2024    HDL 41 (L) 04/22/2024    HDL 43 (L) 10/21/2023     Lab Results   Component Value Date    LDLCALC 103 (H) 11/04/2024    LDLCALC 99 04/22/2024    LDLCALC 102 (H) 10/21/2023     Lab Results   Component Value Date    TRIG 160 (H) 11/04/2024    TRIG 231 (H) 04/22/2024    TRIG 112 10/21/2023     No results found for: \"CHOLHDL\"    Hemoglobin A1C  Lab Results   Component Value Date    HGBA1C 5.3 05/11/2023       Fasting Glucose  Lab Results   Component Value Date    GLUF 84 04/22/2024       Insulin     Thyroid  Lab Results   Component Value Date    TSH 1.01 10/15/2022       Hepatic Function Panel  Lab Results   Component Value Date    ALT 19 04/22/2024    AST 20 04/22/2024    ALKPHOS 98 04/22/2024       Celiac " "Disease Antibody Panel  No results found for: \"ENDOMYSIAL IGA\", \"GLIADIN IGA\", \"GLIADIN IGG\", \"IGA\", \"TISSUE TRANSGLUT AB\", \"TTG IGA\"   Iron  Lab Results   Component Value Date    IRON 71 04/22/2024    TIBC 455 (H) 04/22/2024    FERRITIN 11 04/22/2024            ASHISH ZapataN  [facility] CLINICAL NUTRITION SERVICES  [address] [place] [phone]    "

## 2025-03-05 RX ORDER — ERGOCALCIFEROL 1.25 MG/1
CAPSULE, LIQUID FILLED ORAL WEEKLY
Qty: 12 CAPSULE | Refills: 0 | OUTPATIENT
Start: 2025-03-05

## 2025-03-07 ENCOUNTER — TELEPHONE (OUTPATIENT)
Dept: NEUROLOGY | Facility: CLINIC | Age: 25
End: 2025-03-07

## 2025-03-07 NOTE — PROGRESS NOTES
Name: Ruthy Farley      : 2000      MRN: [id number]  Encounter Provider: SANDEE Toro  Encounter Date: 3/10/2025   Encounter department: [facility] [place]  :  Assessment & Plan  Chronic migraine without aura without status migrainosus, not intractable  Since her last visit, she has moved to [place].  She continues to use Emgality for migraine prevention and is happy with how this medication is working for her but she is unsure if it will require another prior authorization due to having a new pharmacy.  Excedrin continues to be effective at eliminating her headaches and she is using it no more than 3x per month.  She denies any new associated symptoms with her headaches.  To review, she denies any tinnitus including pulsatile tinnitus.  Denies any vision changes including tunnel vision or blind spots.  Headaches do not appear to be positional in nature.   Workup:  24 MRI Brain: normal  Additional testing:   No additional testing required at this time  Preventative:  We discussed headache hygiene and lifestyle factors that may improve headaches  Continue Emgality once monthly injection  Past/ failed/contraindicated: amitriptyline, nortriptyline, and propranolol all ineffective  Future options: Topamax, CGRP med, botox  Acute:  Discussed not taking over-the-counter or prescription pain medications more than 3 days per week to prevent medication overuse/rebound headache  Continue excedrin since this has been completely effective.  No more than 3x per week  Past/ failed/contraindicated: ibuprofen (no longer effective)  Future options:  triptan, CGRP, reyvow, trudhesa    Orders:    Galcanezumab-gnlm 120 MG/ML SOAJ; Inject 1 mL under the skin every 30 (thirty) days Begin 30 days after loading dose          History of Present Illness     We had the pleasure of evaluating Ruthy in neurological follow-up today. She is a 24 y.o. year-old female who presents  today for evaluation of headaches.      History obtained from patient as well as available medical record review.     Consult with me 7/22/24:She reports that her migraine headache started around the age of 12.  Recently, they have started worsening in frequency and intensity without any trauma or event.  She is currently experiencing approximately 3 migraine headaches per week although she tracks her migraines on an anny and she has been having them much more frequently in July.  She sees her family doctor and they have tried amitriptyline and added on propranolol both of which have been ineffective for her migraines.  She uses Excedrin to alleviate her headaches and this is effective for her however she reports taking this medication more than 3 times in a week.  OV with me 9/23/24: Qulipta was not covered by her insurance, so we started emgality.  She was able to do 2 injections so far and denies any side effects.  She keeps a migraine diary and in the month of August, she experienced 5 headaches, only 4 of which she took excedrin for.  In sept only 1 so far.  She is happy with the Emgality.  She has only been using the Excedrin as needed, so no concern for medication overuse at this period of time.  May consider a prescriptive abortive medication if needed in the future.    Interval history as of 3/10/25:  Since her last visit, she has moved to [place].  She continues to use Emgality for migraine prevention and is happy with how this medication is working for her but she is unsure if it will require another prior authorization due to having a new pharmacy.  Excedrin continues to be effective at eliminating her headaches and she is using it no more than 3x per month.  She denies any new associated symptoms with her headaches.  To review, she denies any tinnitus including pulsatile tinnitus.  Denies any vision changes including tunnel vision or blind spots.  Headaches do not appear to be positional in nature.       Headaches started at what age?  12 years old  How often do the headaches occur?  3x per week.  Last visit, 1 in the month of sept and 5 in the month of August. Now, about 2-3 per month  What time of the day do the headaches start?  Tend to be later in the day but most of her life she would wake up with the headaches.   How long do the headaches last? Hours to a full day  Are you ever headache free? yes     Aura? without aura     Last eye exam: None recently.      Where is your headache located and pain quality? Right temple area throbbing occasionally stabbing pain through her right temple every couple of weeks and it lasts seconds.   What is the intensity of pain? 6/10  Associated symptoms:   [] Nausea       [] Vomiting        [] Diarrhea  [x] Insomnia    [] Stiff or sore neck   [x] Problems with concentration  [] Photophobia     []Phonophobia      [] Osmophobia  [] Blurred vision   [] Prefer quiet, dark room  [] Light-headed or dizzy     [] Tinnitus   [] Hands or feet tingle or feel numb/paresthesias    [] Ptosis      [] Facial droop  [] Lacrimation  [] Nasal congestion/rhinorrhea   [] Flushing of face      Things that make the headache worse? No specific movement     Headache triggers:  stress, lack of sleep, menstruation     Have you seen someone else for headaches or pain? Yes, PCP  Have you had trigger point injection performed and how often? No  Have you had Botox injection performed and how often? No   Have you had epidural injections or transforaminal injections performed? No  Are you current pregnant or planning on getting pregnant? No, has same sex partner  Have you ever had any Brain imaging? no     LIFESTYLE  Sleep   Averages: 8-9 hours per night  Problems falling asleep?:   No  Problems staying asleep?:  No  Do you snore while asleep? Yes, had sleep apnea testing and it was negative for MATTHEW  Physical activity: Gym 3x per week  Water: 48oz per day  Caffeine: 1 cup 3x per week   Mood: stable mood  per patient     Pertinent family history:  Family history of headaches:  no known family members with significant headaches  Any family history of aneurysms - No     Pertinent social history:  Work: was laid off in may  Lives with lives with their spouse     Illicit Drugs: denies  Alcohol/tobacco: no smoking. Social alcohol but rarely 1x/month     What medications do you take or have you taken for your headaches?:     ABORTIVE:    OTC medications: Excedrin (effective)  Prescription: none  Past/failed/contraindicated: ibuprofen (no longer effective)  Future: triptan, CGRP, reyvow, trudhesa     PREVENTIVE:   OTC medications: calcium-mag-zinc    Prescription: Emgality (effective)  Medications from other providers: Cymbalta  Past/failed/contraindicated: nortriptyline (ineffective), Propranolol, amitriptyline (ineffective), Qulipta (not covered by insurance)   Future: topamax, botox     Alternative therapies used in the past for headaches?   OMT    Review of Systems   Constitutional:  Negative for appetite change, fatigue and fever.   HENT: Negative.  Negative for hearing loss, tinnitus, trouble swallowing and voice change.    Eyes: Negative.  Negative for photophobia, pain and visual disturbance.   Respiratory: Negative.  Negative for shortness of breath.    Cardiovascular: Negative.  Negative for palpitations.   Gastrointestinal: Negative.  Negative for nausea and vomiting.   Endocrine: Negative.  Negative for cold intolerance.   Genitourinary: Negative.  Negative for dysuria, frequency and urgency.   Musculoskeletal:  Negative for back pain, gait problem, myalgias, neck pain and neck stiffness.   Skin: Negative.  Negative for rash.   Allergic/Immunologic: Negative.    Neurological:  Negative for dizziness, tremors, seizures, syncope, facial asymmetry, speech difficulty, weakness, light-headedness, numbness and headaches.   Hematological: Negative.  Does not bruise/bleed easily.   Psychiatric/Behavioral: Negative.   Negative for confusion, hallucinations and sleep disturbance.      I have personally reviewed the MA's review of systems and made changes as necessary.    Current Outpatient Medications on File Prior to Visit   Medication Sig Dispense Refill    CALCIUM PO Take by mouth Taking a Calcium/ magnesium and Zinc      clonazePAM (KlonoPIN) 0.5 mg tablet Take 1 tablet (0.5 mg total) by mouth 2 (two) times a day as needed for anxiety 60 tablet 0    DULoxetine (CYMBALTA) 20 mg capsule TAKE ONE CAPSULE BY MOUTH THREE TIMES A DAY 90 capsule 2    ergocalciferol (VITAMIN D2) 50,000 units TAKE ONE TABLET BY MOUTH ONCE A WEEK 12 capsule 0    Sodium Fluoride 5000 PPM 1.1 % PSTE BRUSH THOROUGHLY ONCE DAILY PREFERABLY AT BEDTIME FOR 2 MINUTES, EXPECTORATE AFTER USE, DO NOT RINSE.      traZODone (DESYREL) 50 mg tablet Take 1 tablet (50 mg total) by mouth daily at bedtime 30 tablet 3    traZODone (DESYREL) 50 mg tablet Take 1 tablet (50 mg total) by mouth daily at bedtime as needed for sleep 30 tablet 3    vitamin B-12 (VITAMIN B-12) 1,000 mcg tablet TAKE ONE TABLET BY MOUTH EVERY DAY 30 tablet 3    [DISCONTINUED] Galcanezumab-gnlm 120 MG/ML SOAJ Inject 1 mL under the skin every 30 (thirty) days Begin 30 days after loading dose 1 mL 11     No current facility-administered medications on file prior to visit.      Social History     Tobacco Use    Smoking status: Former     Current packs/day: 0.00     Types: Cigarettes     Quit date: 02/2022     Years since quitting: 3.1    Smokeless tobacco: Never   Vaping Use    Vaping status: Never Used   Substance and Sexual Activity    Alcohol use: Yes     Comment: socially    Drug use: Not Currently    Sexual activity: Yes     Partners: Female     Birth control/protection: None      Objective   /72 (BP Location: Right arm, Patient Position: Sitting, Cuff Size: Extra-Large)   Pulse 78     On neurological examination the patient was awake, alert, attentive, oriented to person, place, and time.  Recent and remote memory intact to conversation with no evidence of language dysfunction. Satisfactory fund of knowledge. Normal attention span and concentration.  Mood, affect and judgement are appropriate. Speech is fluent without dysarthria or aphasia. Face appears symmetric, with no obvious weakness noted.  Audition is intact to casual conversation.  Eye movements are intact.  Able to move bilateral upper extremities antigravity without difficulty.      Labs:  Lab Results   Component Value Date    HUI1CKWNOZRU 1.270 11/04/2024    TSH 1.01 10/15/2022     Radiology Results Review:   8/1/2024 MRI brain: No acute infarction, intracranial hemorrhage or mass effect.     Administrative Statements   I have spent a total time of 15 minutes in caring for this patient on the day of the visit/encounter including Diagnostic results, Prognosis, Risks and benefits of tx options, Instructions for management, Patient and family education, Importance of tx compliance, Risk factor reductions, Impressions, Counseling / Coordination of care, Documenting in the medical record, Reviewing/placing orders in the medical record (including tests, medications, and/or procedures), and Obtaining or reviewing history  .

## 2025-03-07 NOTE — PATIENT INSTRUCTIONS
Patient Instructions:    Additional Testing  -I am not recommending further Neurodiagnostic workup at this time    Headache/migraine treatment:     Prevention  -To take every day to help prevent headaches - not to take at the time of headache:  -Continue Emgality monthly injectable  -Over the counter preventive supplements for headaches/migraines (if you try, try for 3 months straight):  -Magnesium 400mg daily (If any diarrhea or upset stomach, decrease dose as tolerated)  -Riboflavin (Vitamin B2) 400mg daily (may make your urine bright/neon yellow)  - All supplements can be purchased online    Abortive  -For immediate treatment of a headache/migraine  -For your more moderate to severe migraines take this medication as early as possible:  -Continue Excedrin for now since it's effective. May consider Imitrex in future if it becomes less effective.    -It is ok to take ibuprofen, acetaminophen or naproxen (Advil, Tylenol,  Aleve, Excedrin) if they help your headaches you should limit these to No more than 3 times a week to avoid medication overuse/rebound headaches.     Lifestyle Recommendations:    -Remain well-hydrated drinking at least 48 to 64 ounces of noncaffeinated beverages per day in addition to anything caffeinated.    -Getting adequate rest is also very important for migraine prevention (aim for 7-8 hours per night).     -Regular exercise is also beneficial for headache prevention.  I would encourage at the least 5 days of physical exercise weekly for at least 30 minutes.   -I would like for them to keep track of their migraines using an application on their phone or calendar as they see fit. Phone applications: Migraine [place] or Migraine Diary.    Education and Follow-up:    -Please call or send a [employer] message with any questions or concerns. Please present to the emergency room with any concerning symptoms such as: worst headache of your life, sudden painless loss of vision or double vision, difficulty  speaking or swallowing, vertigo/room spinning that does not quickly resolve, or weakness/numbness/loss of coordination affecting 1 side of the face or body.  -Follow up in 6 months or sooner if needed.

## 2025-03-07 NOTE — TELEPHONE ENCOUNTER
Spoke with patient and she informed me. That she needs an Prior Auth for the medication Emgailty. Please assist.

## 2025-03-10 ENCOUNTER — TELEPHONE (OUTPATIENT)
Age: 25
End: 2025-03-10

## 2025-03-10 ENCOUNTER — OFFICE VISIT (OUTPATIENT)
Dept: NEUROLOGY | Facility: CLINIC | Age: 25
End: 2025-03-10
Payer: COMMERCIAL

## 2025-03-10 VITALS — HEART RATE: 78 BPM | DIASTOLIC BLOOD PRESSURE: 72 MMHG | SYSTOLIC BLOOD PRESSURE: 128 MMHG

## 2025-03-10 DIAGNOSIS — G43.709 CHRONIC MIGRAINE WITHOUT AURA WITHOUT STATUS MIGRAINOSUS, NOT INTRACTABLE: ICD-10-CM

## 2025-03-10 PROCEDURE — 99213 OFFICE O/P EST LOW 20 MIN: CPT

## 2025-03-10 NOTE — ASSESSMENT & PLAN NOTE
Since her last visit, she has moved to [place].  She continues to use Emgality for migraine prevention and is happy with how this medication is working for her but she is unsure if it will require another prior authorization due to having a new pharmacy.  Excedrin continues to be effective at eliminating her headaches and she is using it no more than 3x per month.  She denies any new associated symptoms with her headaches.  To review, she denies any tinnitus including pulsatile tinnitus.  Denies any vision changes including tunnel vision or blind spots.  Headaches do not appear to be positional in nature.   Workup:  8/1/24 MRI Brain: normal  Additional testing:   No additional testing required at this time  Preventative:  We discussed headache hygiene and lifestyle factors that may improve headaches  Continue Emgality once monthly injection  Past/ failed/contraindicated: amitriptyline, nortriptyline, and propranolol all ineffective  Future options: Topamax, CGRP med, botox  Acute:  Discussed not taking over-the-counter or prescription pain medications more than 3 days per week to prevent medication overuse/rebound headache  Continue excedrin since this has been completely effective.  No more than 3x per week  Past/ failed/contraindicated: ibuprofen (no longer effective)  Future options:  triptan, CGRP, reyvow, trudhesa    Orders:    Galcanezumab-gnlm 120 MG/ML SOAJ; Inject 1 mL under the skin every 30 (thirty) days Begin 30 days after loading dose

## 2025-03-10 NOTE — TELEPHONE ENCOUNTER
Prior auth for Emgality submitted through [facility], [place] [id number]. The med is approved through 3/10/2026. Called [facility] and made the pharmacist aware of the approval. Sent pt a message through [employer].

## 2025-03-10 NOTE — PROGRESS NOTES
Name: Ruthy Farley      : 2000      MRN: [id number]  Encounter Provider: SANDEE Toro  Encounter Date: 3/10/2025   Encounter department: [facility]  :  Assessment & Plan      {Ambulatory Patient Instructions (Optional):94681}    History of Present Illness {?Quick Links Encounters * My Last Note * Last Note in Specialty * Snapshot * Since Last Visit * History :94341}  Migraine  Pertinent negatives include no back pain, dizziness, eye pain, fever, hearing loss, nausea, neck pain, numbness, photophobia, seizures, tinnitus, vomiting or weakness.      Review of Systems   Constitutional:  Negative for appetite change, fatigue and fever.   HENT: Negative.  Negative for hearing loss, tinnitus, trouble swallowing and voice change.    Eyes: Negative.  Negative for photophobia, pain and visual disturbance.   Respiratory: Negative.  Negative for shortness of breath.    Cardiovascular: Negative.  Negative for palpitations.   Gastrointestinal: Negative.  Negative for nausea and vomiting.   Endocrine: Negative.  Negative for cold intolerance.   Genitourinary: Negative.  Negative for dysuria, frequency and urgency.   Musculoskeletal:  Negative for back pain, gait problem, myalgias, neck pain and neck stiffness.   Skin: Negative.  Negative for rash.   Allergic/Immunologic: Negative.    Neurological:  Negative for dizziness, tremors, seizures, syncope, facial asymmetry, speech difficulty, weakness, light-headedness, numbness and headaches.   Hematological: Negative.  Does not bruise/bleed easily.   Psychiatric/Behavioral: Negative.  Negative for confusion, hallucinations and sleep disturbance.     I have personally reviewed the MA's review of systems and made changes as necessary.    {Select to insert medical history sections (Optional):11934}     Objective {?Quick Links Trend Vitals * Enter New Vitals * Results Review * Timeline (Adult) * Labs * Imaging * Cardiology *  Procedures * Lung Cancer Screening * Surgical eConsent :17754}  There were no vitals taken for this visit.    Physical Exam  Neurological Exam    {Radiology Results Review (Optional):95674}    {Administrative / Billing Section (Optional):90345}

## 2025-03-10 NOTE — TELEPHONE ENCOUNTER
Antoinette Bassett to forward PA requests and nursing tasks to Neurology Neurovascular Team 4    Clinical team - please assist with PA for Emgality

## 2025-03-10 NOTE — TELEPHONE ENCOUNTER
----- Message from Nancy LESLIE sent at 3/10/2025 10:21 AM EDT -----  Regarding: FW: PA    ----- Message -----  From: SANDEE Toro  Sent: 3/10/2025   9:48 AM EDT  To: Neurology [place] Clinical; #  Subject: PA                                               Medication prescribed for patient that likely requires PA: emgality. Patient has been on this medication but she moved and changed pharmacies she said she may need a new auth? Which pool should I send this to moving forward?    Thank you for your assistance.

## 2025-03-23 DIAGNOSIS — E55.9 VITAMIN D INSUFFICIENCY: ICD-10-CM

## 2025-03-24 DIAGNOSIS — E53.8 VITAMIN B12 DEFICIENCY: ICD-10-CM

## 2025-03-24 RX ORDER — ERGOCALCIFEROL 1.25 MG/1
CAPSULE, LIQUID FILLED ORAL WEEKLY
Qty: 12 CAPSULE | Refills: 0 | OUTPATIENT
Start: 2025-03-24

## 2025-03-26 DIAGNOSIS — E55.9 VITAMIN D INSUFFICIENCY: ICD-10-CM

## 2025-03-26 RX ORDER — LANOLIN ALCOHOL/MO/W.PET/CERES
1000 CREAM (GRAM) TOPICAL DAILY
Qty: 30 TABLET | Refills: 0 | Status: SHIPPED | OUTPATIENT
Start: 2025-03-26

## 2025-03-26 RX ORDER — ERGOCALCIFEROL 1.25 MG/1
CAPSULE, LIQUID FILLED ORAL WEEKLY
Qty: 12 CAPSULE | Refills: 0 | OUTPATIENT
Start: 2025-03-26

## 2025-04-03 ENCOUNTER — CLINICAL SUPPORT (OUTPATIENT)
Dept: NUTRITION | Facility: HOSPITAL | Age: 25
End: 2025-04-03
Payer: COMMERCIAL

## 2025-04-03 DIAGNOSIS — E78.1 HYPERTRIGLYCERIDEMIA: ICD-10-CM

## 2025-04-03 PROCEDURE — 97803 MED NUTRITION INDIV SUBSEQ: CPT

## 2025-04-03 NOTE — PROGRESS NOTES
" Nutrition Assessment Form    Patient Name: Ruthy Farley    YOB: 2000    Sex: Female     Assessment Date: 4/3/2025  Start Time: 10:30 am Stop Time: 11:00 pm Total Minutes: 30     Data:  Present at session: self   Parent/Patient Concerns/reason for visit:  \"We are fully moved in\"   Medical Dx/Reason for Referral: LIFESTYLE NUTRITION-seen at [facility] as OP MNT     E78.1 (ICD-10-CM) - Hypertriglyceridemia  Provider: Teresa Montana DO   Referred d/t hx of ED/DE   Past Medical History:   Diagnosis Date    Anxiety     Depression     Obsessive-compulsive disorder     Sleep difficulties        Current Outpatient Medications   Medication Sig Dispense Refill    CALCIUM PO Take by mouth Taking a Calcium/ magnesium and Zinc      clonazePAM (KlonoPIN) 0.5 mg tablet Take 1 tablet (0.5 mg total) by mouth 2 (two) times a day as needed for anxiety 60 tablet 0    DULoxetine (CYMBALTA) 20 mg capsule TAKE ONE CAPSULE BY MOUTH THREE TIMES A DAY 90 capsule 2    ergocalciferol (VITAMIN D2) 50,000 units TAKE ONE TABLET BY MOUTH ONCE A WEEK 12 capsule 0    Galcanezumab-gnlm 120 MG/ML SOAJ Inject 1 mL under the skin every 30 (thirty) days Begin 30 days after loading dose 1 mL 11    Sodium Fluoride 5000 PPM 1.1 % PSTE BRUSH THOROUGHLY ONCE DAILY PREFERABLY AT BEDTIME FOR 2 MINUTES, EXPECTORATE AFTER USE, DO NOT RINSE.      traZODone (DESYREL) 50 mg tablet Take 1 tablet (50 mg total) by mouth daily at bedtime 30 tablet 3    traZODone (DESYREL) 50 mg tablet Take 1 tablet (50 mg total) by mouth daily at bedtime as needed for sleep 30 tablet 3    vitamin B-12 (VITAMIN B-12) 1,000 mcg tablet TAKE ONE TABLET BY MOUTH EVERY DAY 30 tablet 0     No current facility-administered medications for this visit.        Additional Meds/Supplements: N/A   Special Learning Needs/barriers to learning/any new barriers N/A   Height: 5'3\" Ht Readings from Last 5 Encounters:   12/11/24 5' 3\" (1.6 m)   12/09/24 5' 3\" (1.6 m)   10/15/24 " "5' 3\" (1.6 m)   09/23/24 5' 3\" (1.6 m)   09/23/24 5' 3\" (1.6 m)      Weight:  232# (did not get a wt today, virtual) Wt Readings from Last 10 Encounters:   12/11/24 105 kg (232 lb 3.2 oz)   12/09/24 105 kg (232 lb)   10/15/24 108 kg (238 lb 1.6 oz)   09/23/24 108 kg (238 lb 9.6 oz)   09/23/24 108 kg (238 lb)   08/15/24 109 kg (240 lb)   08/08/24 109 kg (240 lb)   07/22/24 110 kg (242 lb)   06/26/24 109 kg (241 lb 3.2 oz)   05/16/24 112 kg (246 lb 6.4 oz)     Estimated body mass index is 41.13 kg/m² as calculated from the following:    Height as of 12/11/24: 5' 3\" (1.6 m).    Weight as of 12/11/24: 105 kg (232 lb 3.2 oz).   Recent Weight Change: [x]Yes     []No  Amount:  -14# x 10 mo      Energy Needs: Did not calculate macros at this time   No Known Allergies or intolerances NKFA   Social History     Substance and Sexual Activity   Alcohol Use Yes    Comment: socially    1-2x/month   Social History     Tobacco Use   Smoking Status Former    Current packs/day: 0.00    Types: Cigarettes    Quit date: 02/2022    Years since quitting: 3.1   Smokeless Tobacco Never    N/A   Who shops? patient   Who cooks/cooking methods/Eating out/take out habits   patient     Exercise: See summary     Other: ie: Sleep habits/ stress level/ work habits household-lives with ?/ food security Sleep has decreased   Prior Nutritional Counseling? [x]Yes     []No  When:  03/04/25     Why:  Follow up with this writer.        Diet Hx: 03/04/25-meals have been off d/t moving  Breakfast: 10-11 am Eggs on gyms days (MWF)  Or  Non-gym days (left overs)   Lunch: 1pm Leftovers from dinner   Or  Spring mix salad with healthy toppings + salmon  Or  On gym days--banana and yogurt,      Dinner: 6-7 pm 1 filet of salmon + rice & kepie singh + cucumber  Or  [place] + avocado + potato egg salad  Or  Chicken + rice/pasta + sometimes a vegetable     Snacks: Often something sweet.   Other Notes/ Initial Assessment:    The patient was identified by name and date of " "birth. Ruthy was informed that this is a telemedicine visit and that the visit is being conducted through [employer] VIA [facility]. She agrees to proceed..  My office door was closed. No one else was in the room.  She acknowledged consent and understanding of privacy and security of the video platform. The patient has agreed to participate and understands they can discontinue the visit at any time. Patient is aware this is a billable service.     04/03/25    \"I've been cooking more and have developed a schedule with eating breakfast.\" Pt feels she and her wife have gotten settle into their new place. Pt is going to start to look for a job soon.    Still going to the gym, started to do a hip hop class. Also just tried pickle ball and may do that during the week too. New plan is to go to the gym 3x/week (M/W/F) in addition plus then if she does pickleball and/or dance. On those 3 days she plans to make a quick breakfast and on non-gym days plans to eat leftovers.  Pt feels working in activity other than just the gym will make working out at the gym seem less like a chore.     Has been cooking lunch more often, especially makes salmon filets, Has found making lunches is less time consuming. She has been eating a lot of edamame with dumplings, also adds it to salsa when she is craving chips and salsa.    Discussed tips for quick easy meal tips like pasta salad or wild rice salad.      Sleeping has been poor-suggested looking into magnesium glycinate, recommend to discuss with provider.    Feels like sh eis doing ok with water but has lost track of how much since she broke her water bottle, she would like to get back on track making sure she is drinking enough. She would also like to work on increasing her fiber-reviewed tips.     Started making sourdough with her wife     Follow up: " "06/03/25    _______________________________________________________________________________________________________________________________________________________________  03/04/25  Moved to [facility] 02/22/25.  Recently prescribed Trazodone (started taking 4-5 days) to help with sleep.    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    Going to the gym consistently, states it brings her bladimir and helps her feel more in control of her body. Cardio 15-20 min then strength training, ~ 3x/week.    Last appt it was discussed that pt wanted to try and lose wt but after about 1-2 weeks pt realized it didn't make her feel good-felt she had tried to make more drastic changes: eating in a kcal deficit, not buying snacks, buying very low calorie foods. Felt herself starting to back to disordered eating thoughts that she used to have.     States her omm recently got dianosed with cancer and has a new outlook on the importance of overall health.     Weighs herself once a week at the gym, helps her feel more in control and good about herself.     Fluids: still working on, feels adequate intake at this time.     Since moving: finding it hard to work in 3 meals. Definitely gets in a routine/balanced dinner. Intake still often depends on what she is feeling, time management.  Relies onpublic transportation to go to and from the gym and going to and from is more time consuming than it used to be. Sometimes will eat something small before the the gym and then be super hungry when coming home and not make the best choices.     Pt admits to feeling a little \"out of control\" with their routine/schedule being variable d/t the move and pt not working yet.   Admits to sometimes having difficulty planning even when she has the time if she doesn't have a routine.  Suggested grouping routine into gym days and non gym days and working on getting a meal schedule set up for one group first and then the other.  Suggested giving herself until the end of " "this month for things to be \"variable\" with meals/eating and using this time to trial things out to see what works for pt and her wife.    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 She and her wife are planning to start looking for work-preferable in [place] or if not [place] but no set plans in mind.      2-3 months ago pt decided to try and lose wt. Her eating and workouts have changed as a result. Pt stated she started this right around the last appointment but forgot to mention it.  Feels having a schedule with her internship has been helping with making the changes to her diet/activity and with not skipping meals and with planning.     Working out 5 days a week instead of 3 days a week. Cardio + sometime of strength training or core strengthening.  Heaviest is 248#, now she is 232#.  Still trying to only eat a sweet treat after dinner but that can sometimes be a smoothie or a yasso yogurt bar. Pt still is having cravings for sweets but so far has been able to find healthier alternatives. She has increased her vegetable intake-feels good about this.  Fluid intake goes up and down, currently working on drinking more, still mainly water.      Pt is trying to weigh herself only once a week, sometimes struggling if she sees a 1-2# wt gain. Pt will talk to her wife about this.  Pt states her wife is always supportive of pt but is still a little apprehensive with pt going back to restrictive/obsessive eating, Suggested pt and wife take some time out once per week to touch base on eachothers feelings about this situation-pt likes this idea.       Pt was thinking a lot about losing wt and while she was trying to accept herself for who she is, because she was " thinking about it so much and was feeling bad about her body and how clothes fit.  Pt now is in a better place to be able to make some changes and feels she is in a better head space.  Pt is not calorie counting, feels that will be triggering for her.  This writer sent over [employer] meal plan handout which gives examples of balanced meals and snacks (ex: lunch may be 3 protein, 1 fat, 2-3 CHO, 2 vegetable) and there is info included on what 1 unit from each group looks like. Pt feels this may be helpful to help be a guide for balance and portions.      Pt finds herself getting thrown off when they go out or have an event-often finding herself scrambling around with what to make the next day or so. Discussed if has something planned to perp for the day after the event or start to work on building up freezer meals.      Follow up scheduled 02/13/25 --still job hunting? Moving?  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  Pt is also moving in March to [place]. Pt plans to try to mealprep lunch-plans to stick with salads sine they are currently working for her.      Pt going to the  4 days a week other than this week-pt is feeling a little sick.  Pt has been trying to also go to the gym in the morning before wife has to go to work but having a harder time to go to bed-has a routine but if strays even from the routine a little she feels like she has failed.     Making baklava bites that use dates as the sweetener or just regular dates as a sweet treat. Before the gym has been eating sugar free raspberry jam with cottage cheese.      Eating eggs less often now, has been eating salad for lunch-no dressing, some cheese and avocado, some croutons (small handful), a protein,  Still trying to eat a lot of vegetables for  "dinner.-lately it has been cauliflower and spinach, brussels sprouts.      Mon, tues, wed eats a salmon filet or another fish & has poultry the other days. Lately has often been having a regular or sweet potato as the starch. Pt is incorporation of avocado     Pt has been struggling with drinking as much water as she had been-more so has been forgetting. Having ~ 1 can of seltzer.     Pt is looking for lower-fat items deli meats, shredded cheese, fat free cottage cheese.      Pt feels sometimes she gets in her head and it comes and goes in was with being ok with having a balance with her eating and exercising and other ties if she doesn't do it perfectly  then all Is lost and loses motivation. Pt also notes has wife is concerned pt sometimes may be too focused on \"diet culture\" with some comments such as:  Pt is finding a hard time balancing doing things for her health vs getting back into restrictive eating. Pt plans to have a conversation with her wife to help reassure her that her mindset is for health not disordered eating. Pt is also trying to limit screen time.      Pt would like to build up an arsenal of recipes-discussed tips.      Follow up scheduled 12/10/24 Lifestyle nutrition pt  __________________________________________________________________________________________________  09/06/24  Appointment started late, pt thought it was at 12:30 when it was in fact @ 11:30 am. This writer was able to accommodate for this today.  Pt had an appointment 09/24 but wished to have one sooner to talk through some issues.    Pt has been trying to do something other than eggs 3x/week such as oatmeal or yogurt but hasn't been doing well.  States she is now repulsed by egg whites, now has 2 regular eggs.  Trying to eat a vegetable-raw or cooked with every meal so sometimes has thiem with breakfast +  whole grain toast.  Pt knows though not wanting to increase her cholesterol meals not eating eggs every day/so often. " "Discussed some tips including trying leftovers, maybe homemade pancakes and she can put in different mix ins such as fruit/nuts-discussed portions for high kcal foods like nuts.    Has been trying to reduce eating sweets but only eating them after dinner but lately has been getting more cravings for sweets around 1-3 pm.  Pt has been going to bed and getting up earlier, now has a more balanced lunch. Suggested pt try to have a fruit or a \"mock\" dessert with or after lunch to see if htat fills the craving for something sweet.  Pt is open to this but admits to struggling with thinking of ideas and sometimes motivation. Suggested looking at the apps [employer] or [employer] for some ideas--today suggested energy bites, homemade granola bars, fruit dippend in a little melted chocolate, topping fruit with cool-whip or whippped cream, ricecakes with a little peanut aidee or nutella and fruit.     Pt feels she is doing better with water but struggles remembering-thinks she will get a large water bottle and fill it with what she should get in a day (discussed estimated needs). Pt is staying consistent with going ot the gym 3 days/week. Pt asked about when she should get her next lipid panel. Last one was 04/2024, discussed possibly 6 mo later so 10/2024. This writer reached out to pt's PCP who is on board with this.     F/U scheduled 10/15/24-Lifestyle nutrition appointment.     ___________________________________________________________________________________________________________________  08/06/24    Pt has met goal #1 for breakfast, also switched to low-fat cheese instead of full fat cheese.  As for goal #2 Pt has started to make a salad with red/white cabbage, other vegetables, beans--likes the way that this gets more vegetables and fiber, more balanced meals. She has been getting more interested and motivated to make meals where as she wasn't as much before.   Pt also states it makes her feel good about herself that she " "is meeting her goals.  When discussed continuing the goals or making new ones. This writer gave pt the ok if she wants to stay with the same goals if she feels she still has to work at meeting them... until they become more of a second nature habit. Pt stated she would like to do this also in line with helping her to find balance vs needing to do \"all of the things\" all at once.       Trying to limit sweets to 1 sweet treat at the end of the day and make some other changes but then felt overwhelmed and triggered (r/t disordered eating) so decided to go back to what had been discussed with sticking with only working on our previously set goals.    Pt has been consistent with going to the gym 3 days a week and proud of herself for sticking with it.  Pt also went to a restorative yoga class and liked it, felt it helped with mindfulness, this class is offered ~ once a month as far as pt knows.     Pt is working on figuring gout her hunger and full cues. Feels a hard time deciding if she wants to eat something because is hungry for it, if she is craving it, reviewed additional tips for mindfulness. Also suggested trying to have something healthier but sweet like fruit, low-fat yogurt either before eating her sweet treat or after.     Pt states since she is going to thr gym she is daydreaming less and has less need for the trampoline. She states she is also starting to look for a job.     F/U 09/24/24  ____________________________________________________________________________________________________________________________________________  07/08/24  Pt lives with wife who is a vegetarian and they have different tastes in food. Main proteins are chicken  Pt had an eating disorder from ~12 yo on and off until her 2nd year of college. Pt notes recent labs, especially lipid panel was high.  Pt feels like she has a good relationship with her body and her relationship with food but does want to slow wt gain and improve her " labs but not falling back to disordered eating habits.  Pt's triglycerides increased over 100 from 10/23-04/24 hx an increase in ~20 for her cholesterol in the same time frame. Pt with family hx of DM and the last time her A1c was checked was 05/2023. This writer reached out to pt's referring dr about getting an order to have A1c checked-pt is also agreeable to this.         Hx of eating disorders: started around 13 years old. She and her friend started to focus on wanting to lose wt. States on 15th or 16th birthday she remembers being on a diet where she mainly ate buckwheat and then would binge eat the next week, buckwheat one week, binge the next. Around that time she realized she started missing her period and it was a shock to her of not wanting to mess with her health. She started to eat more balanced eating and incorporate more fats and her period came back. From ~ 16 years old until 2nd year of college there was some restriction, negative body image, felt there were a lot of foods she couldn't eat. Pt used to weigh herself daily but then threw away her scale and now only gets wt checked at Dr's appointments.  Did not get wt today, will consider at future in person appointment.     Pt then started to focus less on eating after her 2nd year of college but then noticed her wt was creeping up when she would get weighed at the drs office. Pt notes during that time she would daydream and often jumping a lot. Pt tried to work on day dreaming less d/t feeling it is embarrassing and then also stopped jumping-feels like that was a good source of activity.  Pt still sometimes thinks about daydreaming and they got a trampoline but states she has to be in the mindset to daydream and jump so at this time.    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 Pt was working but was recently laid off. Pt plans to start looking for work again after the summer. Pt's wife is currently working. Pt is home during the day.  Pt started working out  "to decompress after work. Goal is to work out to be healthy but still has the mindset of hyper fixating on burning more calories.  Pt feels in college she started to read more about body positivity and feminism and that was helpful for her mindset.     Fluids: since getting her labs back she is drinking 1-2 of her ~24-28 oz cup of water per day.  When its not that hot she will drink decaf black tea (1-4c/day), 1c seltzer per day.  Pt also notes eating more processed sugar snacks prior to her labs coming in. Now has been snacking on more fruit-often berries (fresh or frozen), or raw vegetables. Later on in the evening pt then will often eat more sweet things.     Pt states she isnt sure if she likes structure or if she doesn't. Right now she likes the idea of not having structure for most of her day but is ok with the idea of structuring some of her meals and feels that would be helpful. She would like to have some guidance on reducing foods that may be higher in cholesterol as well as increasing her vegetables. She would also like some guidance on balancing meals.  Sometime for later on may be \"decriminalizing\" sweets/desserts as well as finding some healthier alteratives for sweet foods.  Pt feels she is the type of person to have a larger portion of her favorite dessert less often vs having a smaller portion more often.     Follow up 08/06/24 Virtual, use email---ask about purging. If appointment is on a Tues/Thurs needs to be virtual d/t wife having the car, can come in person Monday or Friday.        Nutrition Diagnosis:   Altered Nutrition-Related Laboratory values  related to hyper triglyceridemia as evidenced by  elevated triglycerides.       Any change or new dx since previous visit:     Nutrition Diagnosis:         Medical Nutrition Therapy Intervention:  [x]Individualized Meal Plan: did not focus on calories/macro [x]Understanding Lab Values: lipid panel, BG, A1c   []Basic Pathophysiology of Disease " []Food/Medication Interactions   []Food Diary [x]Exercise: recommend 150 min/week   [x]Lifestyle/Behavior Modification Techniques: finding balance, HAES, reducing cholesterol, increasing vegetables []Medication, Mechanism of Action   []Label Reading: CHO/ Na/ Fat/ other_________ []Self Blood Glucose Monitoring   []Weight/BMI Goals: gain/lose/maintain N/A []Other -           Comprehension: []Excellent  [x]Very Good  []Good  []Fair   []Poor    Receptivity: []Excellent  [x]Very Good  []Good  []Fair   []Poor    Expected Compliance: []Excellent  [x]Very Good  []Good  []Fair   []Poor        Goals: 04/03/25   Aim for 25-30 g of fiber per day   2.    Aim for 64 oz or more of fluid per day   3.       Goals: 03/04/25   Prep lunch (new meal or make extra dinner for leftovers) the night before a work out day   2.    Continue with working out 3-4x/week   3.     Goals: 12/09/24  Aim to incorportate a vegetable with breakfast most days.   2.    Continue with working out 3-4x/wek   3.         Goals: 10/15/24  Continue to work out 4-5 days a week   2.    Prep food for the day or so after an event or make sure there are freezer meals to cover the day or two after to help prevent straying too far from healthy eating routine.      Goals: 09/06/24  Every other time having eggs for breakfast make the following changeS: 3-4 egg whites (no whole eggs), add vegetables, substitute 1-2 pieces of higher fiber breakfast for breakfast meat   2. On the same says as having the altered egg white breakfast, for dinner: 1c starch/starchy vegetable, 1+ c non-starchy vegetable, 5-6oz lean protein   3.       Goals: 08/06/24  Every other time having eggs for breakfast make the following changeS: 3-4 egg whites (no whole eggs), add vegetables, substitute 1-2 pieces of higher fiber breakfast for breakfast meat   2. On the same says as having the altered egg white breakfast, for dinner: 1c starch/starchy vegetable, 1+ c non-starchy vegetable, 5-6oz lean  "protein   3.     Goals (initial): 07/08/24  Every other time having eggs for breakfast make the following changeS: 3-4 egg whites (no whole eggs), add vegetables, substitute 1-2 pieces of higher fiber bread for breakfast meat   2. On the same says as having the altered egg white breakfast, for dinner: 1c starch/starchy vegetable, 1+ c non-starchy vegetable, 5-6oz lean protein   3.     Labs:  CMP  Lab Results   Component Value Date    K 4.4 04/22/2024     04/22/2024    CO2 28 04/22/2024    BUN 12 04/22/2024    CREATININE 0.75 04/22/2024    GLUF 84 04/22/2024    CALCIUM 9.0 04/22/2024    AST 20 04/22/2024    ALT 19 04/22/2024    ALKPHOS 98 04/22/2024    EGFR 112 04/22/2024       BMP  Lab Results   Component Value Date    CALCIUM 9.0 04/22/2024    K 4.4 04/22/2024    CO2 28 04/22/2024     04/22/2024    BUN 12 04/22/2024    CREATININE 0.75 04/22/2024       Lipids  No results found for: \"CHOL\"  Lab Results   Component Value Date    HDL 36 (L) 11/04/2024    HDL 41 (L) 04/22/2024    HDL 43 (L) 10/21/2023     Lab Results   Component Value Date    LDLCALC 103 (H) 11/04/2024    LDLCALC 99 04/22/2024    LDLCALC 102 (H) 10/21/2023     Lab Results   Component Value Date    TRIG 160 (H) 11/04/2024    TRIG 231 (H) 04/22/2024    TRIG 112 10/21/2023     No results found for: \"CHOLHDL\"    Hemoglobin A1C  Lab Results   Component Value Date    HGBA1C 5.3 05/11/2023       Fasting Glucose  Lab Results   Component Value Date    GLUF 84 04/22/2024       Insulin     Thyroid  Lab Results   Component Value Date    TSH 1.01 10/15/2022       Hepatic Function Panel  Lab Results   Component Value Date    ALT 19 04/22/2024    AST 20 04/22/2024    ALKPHOS 98 04/22/2024       Celiac Disease Antibody Panel  No results found for: \"ENDOMYSIAL IGA\", \"GLIADIN IGA\", \"GLIADIN IGG\", \"IGA\", \"TISSUE TRANSGLUT AB\", \"TTG IGA\"   Iron  Lab Results   Component Value Date    IRON 71 04/22/2024    TIBC 455 (H) 04/22/2024    FERRITIN 11 04/22/2024        "     ASHISH Zapata  [facility]  [address] [place] [phone]

## 2025-04-10 ENCOUNTER — PATIENT MESSAGE (OUTPATIENT)
Dept: NEUROLOGY | Facility: CLINIC | Age: 25
End: 2025-04-10

## 2025-04-11 NOTE — PATIENT COMMUNICATION
I called pharmacist who confirmed they dispensed a prefilled syringe for emgality and that only difference is that patient applied needle; was told was same  .    I called patient to discuss; she was unable to talk at the time; said she wcb when able.

## 2025-04-19 DIAGNOSIS — E55.9 VITAMIN D INSUFFICIENCY: ICD-10-CM

## 2025-04-22 RX ORDER — ERGOCALCIFEROL 1.25 MG/1
CAPSULE, LIQUID FILLED ORAL WEEKLY
Qty: 12 CAPSULE | Refills: 0 | OUTPATIENT
Start: 2025-04-22